# Patient Record
Sex: FEMALE | Race: BLACK OR AFRICAN AMERICAN | NOT HISPANIC OR LATINO | Employment: FULL TIME | URBAN - METROPOLITAN AREA
[De-identification: names, ages, dates, MRNs, and addresses within clinical notes are randomized per-mention and may not be internally consistent; named-entity substitution may affect disease eponyms.]

---

## 2021-12-09 ENCOUNTER — TELEPHONE (OUTPATIENT)
Dept: RHEUMATOLOGY | Facility: CLINIC | Age: 36
End: 2021-12-09
Payer: COMMERCIAL

## 2022-04-08 ENCOUNTER — TELEPHONE (OUTPATIENT)
Dept: RHEUMATOLOGY | Facility: CLINIC | Age: 37
End: 2022-04-08
Payer: COMMERCIAL

## 2022-04-11 ENCOUNTER — LAB VISIT (OUTPATIENT)
Dept: LAB | Facility: HOSPITAL | Age: 37
End: 2022-04-11
Attending: INTERNAL MEDICINE
Payer: COMMERCIAL

## 2022-04-11 ENCOUNTER — OFFICE VISIT (OUTPATIENT)
Dept: RHEUMATOLOGY | Facility: CLINIC | Age: 37
End: 2022-04-11
Payer: COMMERCIAL

## 2022-04-11 VITALS
DIASTOLIC BLOOD PRESSURE: 99 MMHG | BODY MASS INDEX: 39.09 KG/M2 | HEIGHT: 68 IN | SYSTOLIC BLOOD PRESSURE: 164 MMHG | WEIGHT: 257.94 LBS | HEART RATE: 63 BPM

## 2022-04-11 DIAGNOSIS — M79.7 FIBROMYALGIA: Primary | ICD-10-CM

## 2022-04-11 DIAGNOSIS — M79.7 FIBROMYALGIA: ICD-10-CM

## 2022-04-11 LAB
CRP SERPL-MCNC: 20.7 MG/L (ref 0–8.2)
ERYTHROCYTE [SEDIMENTATION RATE] IN BLOOD BY WESTERGREN METHOD: 59 MM/HR (ref 0–20)
RHEUMATOID FACT SERPL-ACNC: <13 IU/ML (ref 0–15)

## 2022-04-11 PROCEDURE — 99204 PR OFFICE/OUTPT VISIT, NEW, LEVL IV, 45-59 MIN: ICD-10-PCS | Mod: S$GLB,,, | Performed by: INTERNAL MEDICINE

## 2022-04-11 PROCEDURE — 86038 ANTINUCLEAR ANTIBODIES: CPT | Performed by: INTERNAL MEDICINE

## 2022-04-11 PROCEDURE — 99204 OFFICE O/P NEW MOD 45 MIN: CPT | Mod: S$GLB,,, | Performed by: INTERNAL MEDICINE

## 2022-04-11 PROCEDURE — 85651 RBC SED RATE NONAUTOMATED: CPT | Performed by: INTERNAL MEDICINE

## 2022-04-11 PROCEDURE — 36415 COLL VENOUS BLD VENIPUNCTURE: CPT | Mod: PO | Performed by: INTERNAL MEDICINE

## 2022-04-11 PROCEDURE — 1159F MED LIST DOCD IN RCRD: CPT | Mod: CPTII,S$GLB,, | Performed by: INTERNAL MEDICINE

## 2022-04-11 PROCEDURE — 81374 HLA I TYPING 1 ANTIGEN LR: CPT | Performed by: INTERNAL MEDICINE

## 2022-04-11 PROCEDURE — 3077F SYST BP >= 140 MM HG: CPT | Mod: CPTII,S$GLB,, | Performed by: INTERNAL MEDICINE

## 2022-04-11 PROCEDURE — 1159F PR MEDICATION LIST DOCUMENTED IN MEDICAL RECORD: ICD-10-PCS | Mod: CPTII,S$GLB,, | Performed by: INTERNAL MEDICINE

## 2022-04-11 PROCEDURE — 3080F PR MOST RECENT DIASTOLIC BLOOD PRESSURE >= 90 MM HG: ICD-10-PCS | Mod: CPTII,S$GLB,, | Performed by: INTERNAL MEDICINE

## 2022-04-11 PROCEDURE — 99999 PR PBB SHADOW E&M-EST. PATIENT-LVL III: ICD-10-PCS | Mod: PBBFAC,,, | Performed by: INTERNAL MEDICINE

## 2022-04-11 PROCEDURE — 3008F BODY MASS INDEX DOCD: CPT | Mod: CPTII,S$GLB,, | Performed by: INTERNAL MEDICINE

## 2022-04-11 PROCEDURE — 99999 PR PBB SHADOW E&M-EST. PATIENT-LVL III: CPT | Mod: PBBFAC,,, | Performed by: INTERNAL MEDICINE

## 2022-04-11 PROCEDURE — 86140 C-REACTIVE PROTEIN: CPT | Performed by: INTERNAL MEDICINE

## 2022-04-11 PROCEDURE — 3008F PR BODY MASS INDEX (BMI) DOCUMENTED: ICD-10-PCS | Mod: CPTII,S$GLB,, | Performed by: INTERNAL MEDICINE

## 2022-04-11 PROCEDURE — 86200 CCP ANTIBODY: CPT | Performed by: INTERNAL MEDICINE

## 2022-04-11 PROCEDURE — 3077F PR MOST RECENT SYSTOLIC BLOOD PRESSURE >= 140 MM HG: ICD-10-PCS | Mod: CPTII,S$GLB,, | Performed by: INTERNAL MEDICINE

## 2022-04-11 PROCEDURE — 86431 RHEUMATOID FACTOR QUANT: CPT | Performed by: INTERNAL MEDICINE

## 2022-04-11 PROCEDURE — 3080F DIAST BP >= 90 MM HG: CPT | Mod: CPTII,S$GLB,, | Performed by: INTERNAL MEDICINE

## 2022-04-11 RX ORDER — CETIRIZINE HYDROCHLORIDE 10 MG/1
10 TABLET ORAL
COMMUNITY

## 2022-04-11 RX ORDER — VILAZODONE HYDROCHLORIDE 10 MG/1
10 TABLET ORAL DAILY
COMMUNITY
Start: 2022-03-31

## 2022-04-11 RX ORDER — PANTOPRAZOLE SODIUM 40 MG/1
40 TABLET, DELAYED RELEASE ORAL DAILY
COMMUNITY
Start: 2022-01-13 | End: 2022-05-30

## 2022-04-11 RX ORDER — ERGOCALCIFEROL 1.25 MG/1
50000 CAPSULE ORAL
COMMUNITY
Start: 2021-09-09 | End: 2022-05-24

## 2022-04-11 RX ORDER — LEVOCETIRIZINE DIHYDROCHLORIDE 5 MG/1
5 TABLET, FILM COATED ORAL NIGHTLY
COMMUNITY

## 2022-04-11 RX ORDER — ONDANSETRON 4 MG/1
4 TABLET, ORALLY DISINTEGRATING ORAL DAILY PRN
COMMUNITY
Start: 2022-04-04

## 2022-04-11 NOTE — PROGRESS NOTES
RHEUMATOLOGY CLINIC INITIAL VISIT    Reason for consult:-  Fibromyalgia.    Chief complaints, HPI, ROS, EXAM, Assessment & Plans:-  Bee Champagne a 36 y.o. pleasant female comes in with worsening symptoms of fibromyalgia.  Fibromyalgia diagnosed by outside rheumatologist several years ago.  Had intolerance to Cymbalta.  Worsening symptoms affecting all over the body with prolonged stiffness.  Intermittent episodes of joint swelling.  There were by outs of swollen fingers raising suspicion for underlying dactylitis.  Her father has skin issues and nail deformities raising suspicion for psoriasis.  She does not have any history of such skin condition.  Low titer positive SHANNON with speckled pattern in the past.  Elevated inflammatory marker-C-reactive protein in the past.  Rheumatological review of system negative otherwise.  Physical examination shows multiple enthesitis.  No synovitis, dactylitis or effusion.  No nail changes.  No sclerodactyly.    1. Fibromyalgia      Problem List Items Addressed This Visit     Fibromyalgia - Primary    Relevant Medications    natrexone tablet 4.5 mg    Other Relevant Orders    Sedimentation rate    C-Reactive Protein    Cyclic Citrullinated Peptide Antibody, IgG    Rheumatoid Factor    SHANNON Screen w/Reflex    HLA B27 Antigen           Active fibromyalgia.  Try low-dose naltrexone.   Repeat inflammatory markers and serology panel for elevated C-reactive protein and enthesitis.  Fibromyalgia could cause enthesitis as well.   If no significant improvement on low-dose naltrexone in 6-8 weeks, consider alternative medications.   If workup shows elevated inflammatory markers, consider adding Plaquenil/methotrexate for possible enthesitis related arthritis.       # Follow up in about 6 weeks (around 2022).      Past Medical History:   Diagnosis Date    Anxiety     Depression     Interstitial cystitis        Past Surgical History:   Procedure Laterality Date      SECTION      TUBAL LIGATION          Social History     Tobacco Use    Smoking status: Never Smoker    Smokeless tobacco: Never Used   Substance Use Topics    Alcohol use: Not Currently    Drug use: Never       Family History   Problem Relation Age of Onset    Cancer Mother     Heart disease Father     Diabetes Mellitus Father     Cancer Paternal Aunt     Hyperlipidemia Paternal Aunt        Review of patient's allergies indicates:   Allergen Reactions    Shellfish containing products        Medication List with Changes/Refills   Current Medications    CETIRIZINE (ZYRTEC) 10 MG TABLET    Take 10 mg by mouth.    D-MANNOSE 500 MG CAP    Take by mouth.    ERGOCALCIFEROL (ERGOCALCIFEROL) 50,000 UNIT CAP    Take 50,000 Units by mouth.    IRON BIS-GLY/FA/C/B12/CA/SUCC (IRON-150 ORAL)    Take by mouth.    LEVOCETIRIZINE (XYZAL) 5 MG TABLET    Take 5 mg by mouth every evening.    ONDANSETRON (ZOFRAN-ODT) 4 MG TBDL    Take 4 mg by mouth daily as needed.    PANTOPRAZOLE (PROTONIX) 40 MG TABLET    Take 40 mg by mouth once daily.    VIIBRYD 10 MG TAB TABLET    Take 10 mg by mouth once daily.         Thank you for allowing me to participate in the care ofBee Condon.    Disclaimer: This note was prepared using voice recognition system and is likely to have sound alike errors and is not proof read.  Please call me with any questions.

## 2022-04-12 ENCOUNTER — PATIENT MESSAGE (OUTPATIENT)
Dept: RHEUMATOLOGY | Facility: CLINIC | Age: 37
End: 2022-04-12
Payer: COMMERCIAL

## 2022-04-12 DIAGNOSIS — L40.59 POLYARTICULAR PSORIATIC ARTHRITIS: ICD-10-CM

## 2022-04-12 DIAGNOSIS — M06.4 UNDIFFERENTIATED INFLAMMATORY ARTHRITIS: Primary | ICD-10-CM

## 2022-04-12 LAB
ANA SER QL IF: NORMAL
CCP AB SER IA-ACNC: <0.5 U/ML

## 2022-04-12 RX ORDER — FOLIC ACID 1 MG/1
1 TABLET ORAL DAILY
Qty: 90 TABLET | Refills: 2 | Status: SHIPPED | OUTPATIENT
Start: 2022-04-12 | End: 2023-01-09

## 2022-04-12 RX ORDER — METHOTREXATE 2.5 MG/1
10 TABLET ORAL
Qty: 16 TABLET | Refills: 2 | Status: SHIPPED | OUTPATIENT
Start: 2022-04-12 | End: 2022-05-24

## 2022-04-12 NOTE — TELEPHONE ENCOUNTER
Labs show high inflammation as suspected. With her father having psoriasis , it is more likely to be psoriatic arthritis. Start MTX and folic acid.   Called and advised patient today.  Discussed in detail about all adverse effects and risks with methotrexate.  No plans for pregnancy.  TUBAL LIGATION.  Schedule CBC CMP for tomorrow to learn liver functions before starting methotrexate.

## 2022-04-13 ENCOUNTER — PATIENT MESSAGE (OUTPATIENT)
Dept: RHEUMATOLOGY | Facility: CLINIC | Age: 37
End: 2022-04-13
Payer: COMMERCIAL

## 2022-04-13 ENCOUNTER — LAB VISIT (OUTPATIENT)
Dept: LAB | Facility: HOSPITAL | Age: 37
End: 2022-04-13
Attending: INTERNAL MEDICINE
Payer: COMMERCIAL

## 2022-04-13 DIAGNOSIS — L40.59 POLYARTICULAR PSORIATIC ARTHRITIS: ICD-10-CM

## 2022-04-13 DIAGNOSIS — M06.4 UNDIFFERENTIATED INFLAMMATORY ARTHRITIS: ICD-10-CM

## 2022-04-13 LAB
ALBUMIN SERPL BCP-MCNC: 3.6 G/DL (ref 3.5–5.2)
ALP SERPL-CCNC: 71 U/L (ref 55–135)
ALT SERPL W/O P-5'-P-CCNC: 12 U/L (ref 10–44)
ANION GAP SERPL CALC-SCNC: 7 MMOL/L (ref 8–16)
AST SERPL-CCNC: 13 U/L (ref 10–40)
BASOPHILS # BLD AUTO: 0.03 K/UL (ref 0–0.2)
BASOPHILS NFR BLD: 0.5 % (ref 0–1.9)
BILIRUB SERPL-MCNC: 0.3 MG/DL (ref 0.1–1)
BUN SERPL-MCNC: 8 MG/DL (ref 6–20)
CALCIUM SERPL-MCNC: 8.9 MG/DL (ref 8.7–10.5)
CHLORIDE SERPL-SCNC: 103 MMOL/L (ref 95–110)
CK SERPL-CCNC: 101 U/L (ref 20–180)
CO2 SERPL-SCNC: 28 MMOL/L (ref 23–29)
CREAT SERPL-MCNC: 0.9 MG/DL (ref 0.5–1.4)
DIFFERENTIAL METHOD: ABNORMAL
EOSINOPHIL # BLD AUTO: 0.2 K/UL (ref 0–0.5)
EOSINOPHIL NFR BLD: 3.7 % (ref 0–8)
ERYTHROCYTE [DISTWIDTH] IN BLOOD BY AUTOMATED COUNT: 12.5 % (ref 11.5–14.5)
EST. GFR  (AFRICAN AMERICAN): >60 ML/MIN/1.73 M^2
EST. GFR  (NON AFRICAN AMERICAN): >60 ML/MIN/1.73 M^2
GLUCOSE SERPL-MCNC: 87 MG/DL (ref 70–110)
HCT VFR BLD AUTO: 37.7 % (ref 37–48.5)
HGB BLD-MCNC: 12.3 G/DL (ref 12–16)
IMM GRANULOCYTES # BLD AUTO: 0.02 K/UL (ref 0–0.04)
IMM GRANULOCYTES NFR BLD AUTO: 0.3 % (ref 0–0.5)
LYMPHOCYTES # BLD AUTO: 1.8 K/UL (ref 1–4.8)
LYMPHOCYTES NFR BLD: 28.4 % (ref 18–48)
MCH RBC QN AUTO: 28.2 PG (ref 27–31)
MCHC RBC AUTO-ENTMCNC: 32.6 G/DL (ref 32–36)
MCV RBC AUTO: 87 FL (ref 82–98)
MONOCYTES # BLD AUTO: 0.5 K/UL (ref 0.3–1)
MONOCYTES NFR BLD: 7.8 % (ref 4–15)
NEUTROPHILS # BLD AUTO: 3.7 K/UL (ref 1.8–7.7)
NEUTROPHILS NFR BLD: 59.3 % (ref 38–73)
NRBC BLD-RTO: 0 /100 WBC
PLATELET # BLD AUTO: 362 K/UL (ref 150–450)
PMV BLD AUTO: 8.5 FL (ref 9.2–12.9)
POTASSIUM SERPL-SCNC: 4 MMOL/L (ref 3.5–5.1)
PROT SERPL-MCNC: 7.3 G/DL (ref 6–8.4)
RBC # BLD AUTO: 4.36 M/UL (ref 4–5.4)
SODIUM SERPL-SCNC: 138 MMOL/L (ref 136–145)
WBC # BLD AUTO: 6.26 K/UL (ref 3.9–12.7)

## 2022-04-13 PROCEDURE — 82550 ASSAY OF CK (CPK): CPT | Performed by: INTERNAL MEDICINE

## 2022-04-13 PROCEDURE — 85025 COMPLETE CBC W/AUTO DIFF WBC: CPT | Performed by: INTERNAL MEDICINE

## 2022-04-13 PROCEDURE — 36415 COLL VENOUS BLD VENIPUNCTURE: CPT | Performed by: INTERNAL MEDICINE

## 2022-04-13 PROCEDURE — 80053 COMPREHEN METABOLIC PANEL: CPT | Performed by: INTERNAL MEDICINE

## 2022-04-13 PROCEDURE — 82085 ASSAY OF ALDOLASE: CPT | Performed by: INTERNAL MEDICINE

## 2022-04-14 ENCOUNTER — PATIENT MESSAGE (OUTPATIENT)
Dept: RHEUMATOLOGY | Facility: CLINIC | Age: 37
End: 2022-04-14
Payer: COMMERCIAL

## 2022-04-15 LAB — ALDOLASE SERPL-CCNC: 4.3 U/L (ref 1.2–7.6)

## 2022-05-03 ENCOUNTER — PATIENT MESSAGE (OUTPATIENT)
Dept: RHEUMATOLOGY | Facility: CLINIC | Age: 37
End: 2022-05-03
Payer: COMMERCIAL

## 2022-05-04 ENCOUNTER — PATIENT MESSAGE (OUTPATIENT)
Dept: RHEUMATOLOGY | Facility: CLINIC | Age: 37
End: 2022-05-04
Payer: COMMERCIAL

## 2022-05-04 NOTE — TELEPHONE ENCOUNTER
Zahira EspanaSteven Conodn,  It takes 3-4 weeks for methotrexate to provide any benefit in terms of arthritis.  Methotrexate could cause fatigue starting 24 hours, lasting up to 72 hours after the dosing.  I would recommend taking Mucinex DM 1 tablet every 12 hours for the 1st 2-3 days after methotrexate dose.  If the symptoms fails to improve with the subsequent dosing, discontinue medication.  We will consider alternative therapies.  Continue daily folic acid  Dr. LORD

## 2022-05-05 LAB
HLA B27 INTERPRETATION: NORMAL
HLA-B27 RELATED AG QL: NEGATIVE
HLA-B27 RELATED AG QL: NORMAL

## 2022-05-24 ENCOUNTER — TELEPHONE (OUTPATIENT)
Dept: RHEUMATOLOGY | Facility: CLINIC | Age: 37
End: 2022-05-24
Payer: COMMERCIAL

## 2022-05-24 ENCOUNTER — LAB VISIT (OUTPATIENT)
Dept: LAB | Facility: HOSPITAL | Age: 37
End: 2022-05-24
Attending: PHYSICIAN ASSISTANT
Payer: COMMERCIAL

## 2022-05-24 ENCOUNTER — OFFICE VISIT (OUTPATIENT)
Dept: RHEUMATOLOGY | Facility: CLINIC | Age: 37
End: 2022-05-24
Payer: COMMERCIAL

## 2022-05-24 VITALS
WEIGHT: 260.81 LBS | BODY MASS INDEX: 39.53 KG/M2 | HEART RATE: 69 BPM | DIASTOLIC BLOOD PRESSURE: 90 MMHG | SYSTOLIC BLOOD PRESSURE: 136 MMHG | HEIGHT: 68 IN

## 2022-05-24 DIAGNOSIS — Z51.81 MEDICATION MONITORING ENCOUNTER: ICD-10-CM

## 2022-05-24 DIAGNOSIS — Z51.81 MEDICATION MONITORING ENCOUNTER: Primary | ICD-10-CM

## 2022-05-24 DIAGNOSIS — M06.4 UNDIFFERENTIATED INFLAMMATORY ARTHRITIS: ICD-10-CM

## 2022-05-24 DIAGNOSIS — M79.7 FIBROMYALGIA: ICD-10-CM

## 2022-05-24 DIAGNOSIS — L40.59 POLYARTICULAR PSORIATIC ARTHRITIS: Primary | ICD-10-CM

## 2022-05-24 DIAGNOSIS — D84.9 IMMUNOCOMPROMISED: ICD-10-CM

## 2022-05-24 PROCEDURE — 3075F PR MOST RECENT SYSTOLIC BLOOD PRESS GE 130-139MM HG: ICD-10-PCS | Mod: CPTII,S$GLB,, | Performed by: PHYSICIAN ASSISTANT

## 2022-05-24 PROCEDURE — 36415 COLL VENOUS BLD VENIPUNCTURE: CPT | Performed by: PHYSICIAN ASSISTANT

## 2022-05-24 PROCEDURE — 87340 HEPATITIS B SURFACE AG IA: CPT | Performed by: PHYSICIAN ASSISTANT

## 2022-05-24 PROCEDURE — 3080F PR MOST RECENT DIASTOLIC BLOOD PRESSURE >= 90 MM HG: ICD-10-PCS | Mod: CPTII,S$GLB,, | Performed by: PHYSICIAN ASSISTANT

## 2022-05-24 PROCEDURE — 3080F DIAST BP >= 90 MM HG: CPT | Mod: CPTII,S$GLB,, | Performed by: PHYSICIAN ASSISTANT

## 2022-05-24 PROCEDURE — 1159F MED LIST DOCD IN RCRD: CPT | Mod: CPTII,S$GLB,, | Performed by: PHYSICIAN ASSISTANT

## 2022-05-24 PROCEDURE — 99999 PR PBB SHADOW E&M-EST. PATIENT-LVL III: CPT | Mod: PBBFAC,,, | Performed by: PHYSICIAN ASSISTANT

## 2022-05-24 PROCEDURE — 3008F BODY MASS INDEX DOCD: CPT | Mod: CPTII,S$GLB,, | Performed by: PHYSICIAN ASSISTANT

## 2022-05-24 PROCEDURE — 99999 PR PBB SHADOW E&M-EST. PATIENT-LVL III: ICD-10-PCS | Mod: PBBFAC,,, | Performed by: PHYSICIAN ASSISTANT

## 2022-05-24 PROCEDURE — 86704 HEP B CORE ANTIBODY TOTAL: CPT | Performed by: PHYSICIAN ASSISTANT

## 2022-05-24 PROCEDURE — 99215 PR OFFICE/OUTPT VISIT, EST, LEVL V, 40-54 MIN: ICD-10-PCS | Mod: S$GLB,,, | Performed by: PHYSICIAN ASSISTANT

## 2022-05-24 PROCEDURE — 1159F PR MEDICATION LIST DOCUMENTED IN MEDICAL RECORD: ICD-10-PCS | Mod: CPTII,S$GLB,, | Performed by: PHYSICIAN ASSISTANT

## 2022-05-24 PROCEDURE — 99215 OFFICE O/P EST HI 40 MIN: CPT | Mod: S$GLB,,, | Performed by: PHYSICIAN ASSISTANT

## 2022-05-24 PROCEDURE — 3075F SYST BP GE 130 - 139MM HG: CPT | Mod: CPTII,S$GLB,, | Performed by: PHYSICIAN ASSISTANT

## 2022-05-24 PROCEDURE — 86480 TB TEST CELL IMMUN MEASURE: CPT | Performed by: PHYSICIAN ASSISTANT

## 2022-05-24 PROCEDURE — 3008F PR BODY MASS INDEX (BMI) DOCUMENTED: ICD-10-PCS | Mod: CPTII,S$GLB,, | Performed by: PHYSICIAN ASSISTANT

## 2022-05-24 RX ORDER — UPADACITINIB 15 MG/1
15 TABLET, EXTENDED RELEASE ORAL DAILY
Qty: 30 TABLET | Refills: 11 | Status: SHIPPED | OUTPATIENT
Start: 2022-05-24 | End: 2022-07-18 | Stop reason: SDUPTHER

## 2022-05-24 NOTE — PROGRESS NOTES
"     RHEUMATOLOGY OUTPATIENT CLINIC NOTE    5/24/2022    Attending Rheumatologist: Nereida Koroma PA-C  Primary Care Provider: Primary Doctor No   Chief Complaint/Reason For Consultation:  Psoriatic Arthritis      Subjective:        Bee Condon is a 36 y.o. female here today for follow up new onset psoriatic arthritis.  She has previously been followed by Rheumatology for fibromyalgia.  At her last office visit in April 2022 she was found to have worsening symptoms all over the body with prolonged stiffness in combination with intermittent in swollen digits.  She does have a family history of skin psoriasis.  History of positive SHANNON with a speckled pattern in the past she also has a history of elevated inflammatory markers.  She was started on methotrexate but was unable to tolerate.  She had extreme fatigue and some GI upset.  She is on low-dose naltrexone for her fibro.  She has not seen much of a difference yet. Rheumatologic systems otherwise negative.  Physical exam shows no skin psoriasis.  She does have multiple tender points consistent with her diagnosis of fibromyalgia.  No active Raynaud's.  No dactylitis present on today's exam.    Serologies   Positive SHANNON with speckled pattern-low titer  Lab Results   Component Value Date    RF <13.0 04/11/2022    Negative HLA B27  Current Rheum Medications:  · Low-dose naltrexone  Previous Rheum Medications:   · MTX, Cymbalta    Past Medical History:   Diagnosis Date    Anxiety     Depression     Interstitial cystitis 2019     Social History     Socioeconomic History    Marital status:    Tobacco Use    Smoking status: Never Smoker    Smokeless tobacco: Never Used   Substance and Sexual Activity    Alcohol use: Not Currently    Drug use: Never    Sexual activity: Yes     Review of patient's allergies indicates:   Allergen Reactions    Shellfish containing products        Objective:   BP (!) 136/90   Pulse 69   Ht 5' 8" (1.727 m)   Wt 118.3 " kg (260 lb 12.9 oz)   BMI 39.66 kg/m²     Immunization History   Administered Date(s) Administered    COVID-19, MRNA, LN-S, PF (MODERNA FULL 0.5 ML DOSE) 02/27/2021, 03/25/2021, 11/08/2021       Current Outpatient Medications:     cetirizine (ZYRTEC) 10 MG tablet, Take 10 mg by mouth., Disp: , Rfl:     d-mannose 500 mg Cap, Take by mouth., Disp: , Rfl:     folic acid (FOLVITE) 1 MG tablet, Take 1 tablet (1 mg total) by mouth once daily., Disp: 90 tablet, Rfl: 2    iron bis-gly/FA/C/B12/Ca/succ (IRON-150 ORAL), Take by mouth., Disp: , Rfl:     levocetirizine (XYZAL) 5 MG tablet, Take 5 mg by mouth every evening., Disp: , Rfl:     natrexone tablet 4.5 mg, Take 1 tablet (4.5 mg total) by mouth every evening., Disp: 90 tablet, Rfl: 3    ondansetron (ZOFRAN-ODT) 4 MG TbDL, Take 4 mg by mouth daily as needed., Disp: , Rfl:     pantoprazole (PROTONIX) 40 MG tablet, Take 40 mg by mouth once daily., Disp: , Rfl:     VIIBRYD 10 mg Tab tablet, Take 10 mg by mouth once daily., Disp: , Rfl:     upadacitinib (RINVOQ) 15 mg 24 hr tablet, Take 1 tablet (15 mg total) by mouth once daily., Disp: 30 tablet, Rfl: 11      Assessment:     1. Polyarticular psoriatic arthritis    2. Medication monitoring encounter    3. Fibromyalgia    4. Immunocompromised          Plan:       · Active psoriatic arthritis and unable to tolerate methotrexate.  Avoid use of TNF inhibitors with history of positive SHANNON.  Trial Rinvoq.  Check TB screen and hepatitis-B screen today.  · Compromised immune system secondary to autoimmune disease and use of immunosuppressive drugs. Monitor carefully for infections and toxicities- Currently denies issues with recurrent infections. Advised to get immediate medical care if any infection.  · Recommend Yearly Skin Cancer Screening by Dermatology for all patients on biologic or Kirk. advised strict adherence to age appropriate vaccinations (shingles, pneumonia, flu and covid) and cancer screenings with PCP    · Patient advised to hold DMARD and/or biologic therapy for signs of infection or for surgery. If you are unsure what to do please call our office for instruction.[facility] [phone]  · no current medication related issues, no evidence of toxicity. I ordered labs for toxicity monitoring;  results reviewed and discussed findings with the patient   · Return to clinic:  3 months with safety labs    The patient understands, chooses and consents to this plan and accepts all the risks which include but are not limited to the risks mentioned above.     Method of contact with patient concerns: [facility]    60 minutes of total time spent on the encounter, which includes face to face time and non-face to face time preparing to see the patient (eg, review of tests), Obtaining and/or reviewing separately obtained history, Documenting clinical information in the electronic or other health record, Independently interpreting results (not separately reported) and communicating results to the patient/family/caregiver, or Care coordination (not separately reported).          Disclaimer: This note was prepared using a voice recognition system and is likely to have sound alike errors within the text.       Nereida Koroma PA-C  Rheumatology Department   [facility]

## 2022-05-25 ENCOUNTER — DOCUMENTATION ONLY (OUTPATIENT)
Dept: RHEUMATOLOGY | Facility: CLINIC | Age: 37
End: 2022-05-25
Payer: COMMERCIAL

## 2022-05-25 LAB
HBV CORE AB SERPL QL IA: NEGATIVE
HBV SURFACE AG SERPL QL IA: NEGATIVE

## 2022-05-26 ENCOUNTER — PATIENT MESSAGE (OUTPATIENT)
Dept: RHEUMATOLOGY | Facility: CLINIC | Age: 37
End: 2022-05-26
Payer: COMMERCIAL

## 2022-05-26 ENCOUNTER — SPECIALTY PHARMACY (OUTPATIENT)
Dept: PHARMACY | Facility: CLINIC | Age: 37
End: 2022-05-26
Payer: COMMERCIAL

## 2022-05-26 LAB
GAMMA INTERFERON BACKGROUND BLD IA-ACNC: 0 IU/ML
M TB IFN-G CD4+ BCKGRND COR BLD-ACNC: 0.01 IU/ML
MITOGEN IGNF BCKGRD COR BLD-ACNC: 8.83 IU/ML
TB GOLD PLUS: NEGATIVE
TB2 - NIL: 0.01 IU/ML

## 2022-05-26 NOTE — TELEPHONE ENCOUNTER
Hello, this is Shameka Mendiola with [facility].  We are working on your prescription that your doctor has sent us. We will be working with your insurance to get this approved for you. We will be calling you along the way with updates on your medication.  If you have any questions, you can reach us at [phone].    Welcome call outcome: Patient/caregiver reached     Awaiting TB results.

## 2022-05-26 NOTE — TELEPHONE ENCOUNTER
Financial Assistance - Rinvoq    -Obtained consent for FA? Patient already had a copay card    BIN: [id number]  JESUSITAN: [id number]  ID: [id number]  Group: [id number]

## 2022-05-26 NOTE — TELEPHONE ENCOUNTER
Benefits Investigation - Rinvoq    Insurance name: [employer]  Rep name:     Copay amount: $100  Deductible: $0  OOPmax: $3000  OSP in network? Yes  Tier level (if applicable): N/A

## 2022-05-27 ENCOUNTER — PATIENT MESSAGE (OUTPATIENT)
Dept: RHEUMATOLOGY | Facility: CLINIC | Age: 37
End: 2022-05-27
Payer: COMMERCIAL

## 2022-05-30 ENCOUNTER — SPECIALTY PHARMACY (OUTPATIENT)
Dept: PHARMACY | Facility: CLINIC | Age: 37
End: 2022-05-30
Payer: COMMERCIAL

## 2022-05-30 NOTE — TELEPHONE ENCOUNTER
Specialty Pharmacy - Initial Clinical Assessment    Specialty Medication Orders Linked to Encounter    Flowsheet Row Most Recent Value   Medication #1 upadacitinib (RINVOQ) 15 mg 24 hr tablet (Order#[id number], Rx#[id number])        Patient Diagnosis   L40.59 - Polyarticular psoriatic arthritis    Subjective    Bee Condon is a 36 y.o. female, who is followed by the specialty pharmacy service for management and education.    Recent Encounters     Date Type Provider Description    05/30/2022 Specialty Pharmacy Luz Maria Richards, Ritesh Initial Clinical Assessment    05/26/2022 Specialty Pharmacy Shameka Mendiola, Ritesh Referral Authorization        Clinical call attempts since last clinical assessment   No call attempts found.     Current Outpatient Medications   Medication Sig    cetirizine (ZYRTEC) 10 MG tablet Take 10 mg by mouth.    d-mannose 500 mg Cap Take by mouth.    folic acid (FOLVITE) 1 MG tablet Take 1 tablet (1 mg total) by mouth once daily.    iron bis-gly/FA/C/B12/Ca/succ (IRON-150 ORAL) Take by mouth.    levocetirizine (XYZAL) 5 MG tablet Take 5 mg by mouth every evening.    natrexone tablet 4.5 mg Take 1 tablet (4.5 mg total) by mouth every evening.    ondansetron (ZOFRAN-ODT) 4 MG TbDL Take 4 mg by mouth daily as needed.    upadacitinib (RINVOQ) 15 mg 24 hr tablet Take 1 tablet (15 mg total) by mouth once daily.    VIIBRYD 10 mg Tab tablet Take 10 mg by mouth once daily.   Last reviewed on 5/30/2022 10:39 AM by Luz Maria Richards, Ritesh    Review of patient's allergies indicates:   Allergen Reactions    Shellfish containing products    Last reviewed on  5/30/2022 10:33 AM by Luz Maria Richards    Drug Interactions    Drug interactions evaluated: yes  Clinically relevant drug interactions identified: yes   Interactions list: DDI duplicate therapy- Zyrtec + Xyzal   Drug management plan: Pt confirmed she does not take both at the same time.  She alternates.     Provided the patient with educational  material regarding drug interactions: not applicable         Adverse Effects    Activity change: Pos  Fatigue: Pos  Arthralgias: Pos       Assessment Questions - Documented Responses    Flowsheet Row Most Recent Value   Assessment    Medication Reconciliation completed for patient Yes   During the past 4 weeks, has patient missed any activities due to condition or medication? Missed Work  [Pt said she had to quit her job and is unable to work due to PsA]   Number of Days missed 28   During the past 4 weeks, did patient have any of the following urgent care visits? None   Goals of Therapy Status Discussed (new start)   Status of the patients ability to self-administer: Is Able   All education points have been covered with patient? Yes, supplemental printed education provided   Welcome packet contents reviewed and discussed with patient? Yes   Assesment completed? Yes   Plan Therapy being initiated   Do you need to open a clinical intervention (i-vent)? No   Do you want to schedule first shipment? Yes        Refill Questions - Documented Responses    Flowsheet Row Most Recent Value   Patient Availability and HIPAA Verification    Does patient want to proceed with activity? Yes   HIPAA/medical authority confirmed? Yes   Relationship to patient of person spoken to? Self   Refill Screening Questions    When does the patient need to receive the medication? 05/31/22   Refill Delivery Questions    How will the patient receive the medication? Delivery Lyla   When does the patient need to receive the medication? 05/31/22   Shipping Address Home   Address in [facility] confirmed and updated if neccessary? Yes   Expected Copay ($) 5   Is the patient able to afford the medication copay? Yes   Payment Method CC on file   Days supply of Refill 30   Supplies needed? No supplies needed   Refill activity completed? Yes   Refill activity plan Refill scheduled   Shipment/Pickup Date: 05/31/22          Objective    She has a  "past medical history of Anxiety, Depression, and Interstitial cystitis (2019).    Tried/failed medications: MTX    BP Readings from Last 4 Encounters:   05/24/22 (!) 136/90   04/11/22 (!) 164/99     Ht Readings from Last 4 Encounters:   05/24/22 5' 8" (1.727 m)   04/11/22 5' 8" (1.727 m)     Wt Readings from Last 4 Encounters:   05/24/22 118.3 kg (260 lb 12.9 oz)   04/11/22 117 kg (257 lb 15 oz)     Recent Labs   Lab Result Units 05/24/22  1135 04/13/22  1157   Creatinine mg/dL  --  0.9   ALT U/L  --  12   AST U/L  --  13   Hepatitis B Surface Ag  Negative  --    Hep B Core Total Ab  Negative  --      The goals of prescribed drug therapy management include:  · Supporting patient to meet the prescriber's medical treatment objectives  · Improving or maintaining quality of life  · Maintaining optimal therapy adherence  · Minimizing and managing side effects      Goals of Therapy Status: Discussed (new start)    Assessment/Plan  Patient plans to start therapy on 05/31/22      Indication, dosage, appropriateness, effectiveness, safety and convenience of her specialty medication(s) were reviewed today.     Patient Education   Patient received education on the following:    Expectations and possible outcomes of therapy   Proper use, timely administration, and missed dose management   Duration of therapy   Side effects, including prevention, minimization, and management   Contraindications and safety precautions   New or changed medications, including prescribe and over the counter medications and supplements   Reviews recommended vaccinations, as appropriate   Storage, safe handling, and disposal        Tasks added this encounter   6/23/2022 - Refill Call (Auto Added)  2/28/2023 - Clinical - Follow Up Assesement (Annual)   Tasks due within next 3 months   No tasks due.     Luz Maria Richards, PharmD  [facility]  [address] [facility] [address]  Phone: [phone]  Fax: " [phone]

## 2022-06-07 ENCOUNTER — PATIENT MESSAGE (OUTPATIENT)
Dept: RHEUMATOLOGY | Facility: CLINIC | Age: 37
End: 2022-06-07
Payer: COMMERCIAL

## 2022-06-24 ENCOUNTER — SPECIALTY PHARMACY (OUTPATIENT)
Dept: PHARMACY | Facility: CLINIC | Age: 37
End: 2022-06-24
Payer: COMMERCIAL

## 2022-06-24 NOTE — TELEPHONE ENCOUNTER
Rinvoq transferred to [facility] ([phone]). Called patient and provided this information and advised to reach out to [facility] to schedule delivery. Dis-enrolling patient from [facility] services at this time.

## 2022-06-24 NOTE — TELEPHONE ENCOUNTER
Patient returned [facility]'s refill call for Rinvoq. Claim rejected stating that plan limits are exceeded (max 1 fill per 999 days) and patient must fill at specialty pharmacy.    Called insurance help desk and was informed by rep that patient is allowed a max of one fill outside of [facility] specialty pharmacy and has exceeded that. Patient is now mandated to fill at [facility].

## 2022-07-04 ENCOUNTER — PATIENT MESSAGE (OUTPATIENT)
Dept: RHEUMATOLOGY | Facility: CLINIC | Age: 37
End: 2022-07-04
Payer: COMMERCIAL

## 2022-07-05 ENCOUNTER — PATIENT MESSAGE (OUTPATIENT)
Dept: RHEUMATOLOGY | Facility: CLINIC | Age: 37
End: 2022-07-05
Payer: COMMERCIAL

## 2022-07-10 ENCOUNTER — PATIENT MESSAGE (OUTPATIENT)
Dept: RHEUMATOLOGY | Facility: CLINIC | Age: 37
End: 2022-07-10
Payer: COMMERCIAL

## 2022-07-10 DIAGNOSIS — M79.7 FIBROMYALGIA: ICD-10-CM

## 2022-07-10 DIAGNOSIS — Z51.81 MEDICATION MONITORING ENCOUNTER: ICD-10-CM

## 2022-07-18 RX ORDER — UPADACITINIB 15 MG/1
15 TABLET, EXTENDED RELEASE ORAL DAILY
Qty: 30 TABLET | Refills: 11 | Status: SHIPPED | OUTPATIENT
Start: 2022-07-18 | End: 2023-02-15 | Stop reason: SDUPTHER

## 2022-07-19 ENCOUNTER — TELEPHONE (OUTPATIENT)
Dept: RHEUMATOLOGY | Facility: CLINIC | Age: 37
End: 2022-07-19
Payer: COMMERCIAL

## 2022-07-19 ENCOUNTER — SPECIALTY PHARMACY (OUTPATIENT)
Dept: PHARMACY | Facility: CLINIC | Age: 37
End: 2022-07-19
Payer: COMMERCIAL

## 2022-07-19 DIAGNOSIS — L40.59 POLYARTICULAR PSORIATIC ARTHRITIS: Primary | ICD-10-CM

## 2022-07-19 NOTE — TELEPHONE ENCOUNTER
----- Message from Hung Trujillo sent at 7/19/2022  1:52 PM CDT -----  Contact: Janeth/MICHAEL Fowler would like a call back at [phone] or fax at [phone] in regards to getting clinic notes and clarification for prior authorization.  Thanks

## 2022-07-20 ENCOUNTER — TELEPHONE (OUTPATIENT)
Dept: RHEUMATOLOGY | Facility: CLINIC | Age: 37
End: 2022-07-20
Payer: COMMERCIAL

## 2022-07-20 NOTE — TELEPHONE ENCOUNTER
----- Message from Shameka Mendiola PharmD sent at 7/20/2022 11:31 AM CDT -----  Regarding: Rinvoq  Good morning Charmaine and staff,     [facility] has submitted a PA to Mrs. Condon's new insurance for Rinvoq, however, the plan is requesting additional information: documentation that Mrs. Condon has experienced signs of improvement on Rinvoq. Would you be able to assess and document this in order to gain coverage for this continuation of therapy?      Kind regards,   Shameka Mendiola, MirandaD

## 2022-07-20 NOTE — TELEPHONE ENCOUNTER
----- Message from Shameka Mendiola PharmD sent at 7/20/2022  4:19 PM CDT -----  Regarding: Rinvoq  Good juan Padron and staff,     OSP has submitted a PA to Mrs. Condon's new insurance for Rinvoq, however, the plan is requesting additional information: documentation that Mrs. Condon has experienced signs of improvement on Rinvoq. Would you be able to assess and document this in order to gain coverage for this continuation of therapy?       Kind regards,   Shameka Mendiola, MirandaD

## 2022-07-20 NOTE — TELEPHONE ENCOUNTER
2nd attempt to contact patient to see how she was doing with rinvoq. No answer. lvm for patient to return call to clinic-DD

## 2022-07-20 NOTE — TELEPHONE ENCOUNTER
Has not had OV since starting rinvoq- will you please call to verbally confirm that rinvoq is helping her symptoms?

## 2022-07-21 ENCOUNTER — OFFICE VISIT (OUTPATIENT)
Dept: RHEUMATOLOGY | Facility: CLINIC | Age: 37
End: 2022-07-21
Payer: COMMERCIAL

## 2022-07-21 ENCOUNTER — TELEPHONE (OUTPATIENT)
Dept: RHEUMATOLOGY | Facility: CLINIC | Age: 37
End: 2022-07-21

## 2022-07-21 DIAGNOSIS — D84.9 IMMUNOCOMPROMISED: ICD-10-CM

## 2022-07-21 DIAGNOSIS — L40.59 POLYARTICULAR PSORIATIC ARTHRITIS: Primary | ICD-10-CM

## 2022-07-21 DIAGNOSIS — Z51.81 MEDICATION MONITORING ENCOUNTER: ICD-10-CM

## 2022-07-21 PROCEDURE — 99214 PR OFFICE/OUTPT VISIT, EST, LEVL IV, 30-39 MIN: ICD-10-PCS | Mod: 95,,, | Performed by: INTERNAL MEDICINE

## 2022-07-21 PROCEDURE — 1160F PR REVIEW ALL MEDS BY PRESCRIBER/CLIN PHARMACIST DOCUMENTED: ICD-10-PCS | Mod: CPTII,95,, | Performed by: INTERNAL MEDICINE

## 2022-07-21 PROCEDURE — 99214 OFFICE O/P EST MOD 30 MIN: CPT | Mod: 95,,, | Performed by: INTERNAL MEDICINE

## 2022-07-21 PROCEDURE — 1160F RVW MEDS BY RX/DR IN RCRD: CPT | Mod: CPTII,95,, | Performed by: INTERNAL MEDICINE

## 2022-07-21 PROCEDURE — 1159F PR MEDICATION LIST DOCUMENTED IN MEDICAL RECORD: ICD-10-PCS | Mod: CPTII,95,, | Performed by: INTERNAL MEDICINE

## 2022-07-21 PROCEDURE — 1159F MED LIST DOCD IN RCRD: CPT | Mod: CPTII,95,, | Performed by: INTERNAL MEDICINE

## 2022-07-21 NOTE — TELEPHONE ENCOUNTER
Florence requesting additional information.  Message sent to [facility] for assessment and documentation of improvement on Rinvoq in order to gain coverage for continuation of therapy.    Will continue to follow up.

## 2022-07-21 NOTE — PROGRESS NOTES
RHEUMATOLOGY FOLLOW UP - TELE VISIT     The patient location is: [place]  The chief complaint leading to consultation is:  Follow-up for psoriatic arthritis  Visit type: Virtual visit with synchronous audio and video  Total time spent with patient:  10 minutes  Each patient to whom he or she provides medical services by telemedicine is:  (1) informed of the relationship between the physician and patient and the respective role of any other health care provider with respect to management of the patient; and (2) notified that he or she may decline to receive medical services by telemedicine and may withdraw from such care at any time.    Chief complaints, HPI, ROS, EXAM, Assessment & Plans:-  Bee Champagne a 36 y.o. pleasant female seen today for follow-up visit.  She has psoriatic arthritis treated with Rinvoq after failure and intolerance to methotrexate therapy.  She reports significant improvement in her psoriatic arthritis since starting Rinvoq.  70% improvement in her joint pain, stiffness and swelling.  She had a pain of 10/10 at initial presentation which is only 3/10 now.  She has also noticed significant improvement in her fatigue and she has been working 2 jobs with improvement in her pain and energy level.  No adverse effects.  Rheumatological review of system negative otherwise.  Physical examination shows 100% fist formation bilateral hands with excellent range of motion of bilateral shoulders.    1. Polyarticular psoriatic arthritis    2. Medication monitoring encounter    3. Immunocompromised      Problem List Items Addressed This Visit     Polyarticular psoriatic arthritis - Primary      Other Visit Diagnoses     Medication monitoring encounter        Immunocompromised               70% improvement of psoriatic arthritis on Rinvoq.   Continue Rinvoq.   Compromised immune system secondary to autoimmune disease and use of immunosuppressive drugs. Monitor carefully for infections. Advised to get  immediate medical care if any infection. Also advised strict adherence to age appropriate vaccinations and cancer screenings with PCP.   Hold Rinvoq if any infection.   Safety labs every visit.        Past Medical History:   Diagnosis Date    Anxiety     Depression     Interstitial cystitis        Past Surgical History:   Procedure Laterality Date     SECTION      TUBAL LIGATION          Social History     Tobacco Use    Smoking status: Never Smoker    Smokeless tobacco: Never Used   Substance Use Topics    Alcohol use: Not Currently    Drug use: Never       Family History   Problem Relation Age of Onset    Cancer Mother     Heart disease Father     Diabetes Mellitus Father     Cancer Paternal Aunt     Hyperlipidemia Paternal Aunt        Review of patient's allergies indicates:   Allergen Reactions    Shellfish containing products        Medication List with Changes/Refills   Current Medications    CETIRIZINE (ZYRTEC) 10 MG TABLET    Take 10 mg by mouth.    D-MANNOSE 500 MG CAP    Take by mouth.    FOLIC ACID (FOLVITE) 1 MG TABLET    Take 1 tablet (1 mg total) by mouth once daily.    IRON BIS-GLY/FA/C/B12/CA/SUCC (IRON-150 ORAL)    Take by mouth.    LEVOCETIRIZINE (XYZAL) 5 MG TABLET    Take 5 mg by mouth every evening.    NATREXONE TABLET 4.5 MG    Take 1 tablet (4.5 mg total) by mouth every evening.    ONDANSETRON (ZOFRAN-ODT) 4 MG TBDL    Take 4 mg by mouth daily as needed.    UPADACITINIB (RINVOQ) 15 MG 24 HR TABLET    Take 1 tablet (15 mg total) by mouth once daily.    VIIBRYD 10 MG TAB TABLET    Take 10 mg by mouth once daily.       Disclaimer: This note was prepared using voice recognition system and is likely to have sound alike errors and is not proof read.  Please call me with any questions.

## 2022-07-21 NOTE — TELEPHONE ENCOUNTER
3rd attempt to contact patient to see how she was doing with rinvoq. No answer. lvm for patient to return call to clinic. Also sending patient portal message to patient DD

## 2022-07-22 NOTE — TELEPHONE ENCOUNTER
Chart notes from 7/21/22 submitted via fax to [phone] for urgent review.  Case ID: [id number]

## 2022-07-22 NOTE — TELEPHONE ENCOUNTER
Benefits Investigation - Rinvoq    Insurance name: [employer]  OSP in network? No  Must fill [facility] [phone]    Rep stated she cannot discuss copay with [facility] as we are not the dispensing pharmacy.

## 2022-07-22 NOTE — TELEPHONE ENCOUNTER
Transferring to [facility] per plan requirement.    Outgoing call to notify patient of approval, transfer, and instructions to sign up for Rinvoq copay card. No answer, VANE.

## 2022-07-22 NOTE — TELEPHONE ENCOUNTER
Pt returning call.  Informed pt Rinvoq was transferred to [facility] per plan requirement.  Also gave instructions to sign up for copay card. Pt verbalized understanding.

## 2022-08-03 ENCOUNTER — PATIENT MESSAGE (OUTPATIENT)
Dept: RHEUMATOLOGY | Facility: CLINIC | Age: 37
End: 2022-08-03
Payer: COMMERCIAL

## 2022-09-06 ENCOUNTER — LAB VISIT (OUTPATIENT)
Dept: LAB | Facility: HOSPITAL | Age: 37
End: 2022-09-06
Attending: PHYSICIAN ASSISTANT
Payer: COMMERCIAL

## 2022-09-06 DIAGNOSIS — Z51.81 MEDICATION MONITORING ENCOUNTER: ICD-10-CM

## 2022-09-06 DIAGNOSIS — M06.4 UNDIFFERENTIATED INFLAMMATORY ARTHRITIS: ICD-10-CM

## 2022-09-06 LAB
ALBUMIN SERPL BCP-MCNC: 3.4 G/DL (ref 3.5–5.2)
ALP SERPL-CCNC: 65 U/L (ref 55–135)
ALT SERPL W/O P-5'-P-CCNC: 10 U/L (ref 10–44)
ANION GAP SERPL CALC-SCNC: 9 MMOL/L (ref 8–16)
AST SERPL-CCNC: 13 U/L (ref 10–40)
BASOPHILS # BLD AUTO: 0.02 K/UL (ref 0–0.2)
BASOPHILS NFR BLD: 0.2 % (ref 0–1.9)
BILIRUB SERPL-MCNC: 0.2 MG/DL (ref 0.1–1)
BUN SERPL-MCNC: 8 MG/DL (ref 6–20)
CALCIUM SERPL-MCNC: 8.9 MG/DL (ref 8.7–10.5)
CHLORIDE SERPL-SCNC: 105 MMOL/L (ref 95–110)
CO2 SERPL-SCNC: 26 MMOL/L (ref 23–29)
CREAT SERPL-MCNC: 0.8 MG/DL (ref 0.5–1.4)
CRP SERPL-MCNC: 19.2 MG/L (ref 0–8.2)
DIFFERENTIAL METHOD: ABNORMAL
EOSINOPHIL # BLD AUTO: 0.1 K/UL (ref 0–0.5)
EOSINOPHIL NFR BLD: 1.6 % (ref 0–8)
ERYTHROCYTE [DISTWIDTH] IN BLOOD BY AUTOMATED COUNT: 13.3 % (ref 11.5–14.5)
ERYTHROCYTE [SEDIMENTATION RATE] IN BLOOD BY PHOTOMETRIC METHOD: 18 MM/HR (ref 0–36)
EST. GFR  (NO RACE VARIABLE): >60 ML/MIN/1.73 M^2
GLUCOSE SERPL-MCNC: 100 MG/DL (ref 70–110)
HCT VFR BLD AUTO: 34.6 % (ref 37–48.5)
HGB BLD-MCNC: 11.4 G/DL (ref 12–16)
IMM GRANULOCYTES # BLD AUTO: 0.03 K/UL (ref 0–0.04)
IMM GRANULOCYTES NFR BLD AUTO: 0.4 % (ref 0–0.5)
LYMPHOCYTES # BLD AUTO: 2.1 K/UL (ref 1–4.8)
LYMPHOCYTES NFR BLD: 25.8 % (ref 18–48)
MCH RBC QN AUTO: 28.1 PG (ref 27–31)
MCHC RBC AUTO-ENTMCNC: 32.9 G/DL (ref 32–36)
MCV RBC AUTO: 85 FL (ref 82–98)
MONOCYTES # BLD AUTO: 0.6 K/UL (ref 0.3–1)
MONOCYTES NFR BLD: 7.3 % (ref 4–15)
NEUTROPHILS # BLD AUTO: 5.3 K/UL (ref 1.8–7.7)
NEUTROPHILS NFR BLD: 64.7 % (ref 38–73)
NRBC BLD-RTO: 0 /100 WBC
PLATELET # BLD AUTO: 335 K/UL (ref 150–450)
PMV BLD AUTO: 8.7 FL (ref 9.2–12.9)
POTASSIUM SERPL-SCNC: 3.6 MMOL/L (ref 3.5–5.1)
PROT SERPL-MCNC: 7.1 G/DL (ref 6–8.4)
RBC # BLD AUTO: 4.06 M/UL (ref 4–5.4)
SODIUM SERPL-SCNC: 140 MMOL/L (ref 136–145)
WBC # BLD AUTO: 8.18 K/UL (ref 3.9–12.7)

## 2022-09-06 PROCEDURE — 80053 COMPREHEN METABOLIC PANEL: CPT | Performed by: PHYSICIAN ASSISTANT

## 2022-09-06 PROCEDURE — 85025 COMPLETE CBC W/AUTO DIFF WBC: CPT | Performed by: PHYSICIAN ASSISTANT

## 2022-09-06 PROCEDURE — 36415 COLL VENOUS BLD VENIPUNCTURE: CPT | Performed by: PHYSICIAN ASSISTANT

## 2022-09-06 PROCEDURE — 85652 RBC SED RATE AUTOMATED: CPT | Performed by: PHYSICIAN ASSISTANT

## 2022-09-06 PROCEDURE — 86140 C-REACTIVE PROTEIN: CPT | Performed by: PHYSICIAN ASSISTANT

## 2022-09-13 ENCOUNTER — OFFICE VISIT (OUTPATIENT)
Dept: RHEUMATOLOGY | Facility: CLINIC | Age: 37
End: 2022-09-13
Payer: COMMERCIAL

## 2022-09-13 VITALS
WEIGHT: 263.25 LBS | SYSTOLIC BLOOD PRESSURE: 136 MMHG | DIASTOLIC BLOOD PRESSURE: 89 MMHG | BODY MASS INDEX: 39.9 KG/M2 | HEIGHT: 68 IN | HEART RATE: 65 BPM

## 2022-09-13 DIAGNOSIS — D84.9 IMMUNOCOMPROMISED: ICD-10-CM

## 2022-09-13 DIAGNOSIS — L40.59 POLYARTICULAR PSORIATIC ARTHRITIS: Primary | ICD-10-CM

## 2022-09-13 DIAGNOSIS — Z51.81 MEDICATION MONITORING ENCOUNTER: ICD-10-CM

## 2022-09-13 DIAGNOSIS — M79.7 FIBROMYALGIA: ICD-10-CM

## 2022-09-13 PROCEDURE — 3079F DIAST BP 80-89 MM HG: CPT | Mod: CPTII,S$GLB,, | Performed by: PHYSICIAN ASSISTANT

## 2022-09-13 PROCEDURE — 3075F PR MOST RECENT SYSTOLIC BLOOD PRESS GE 130-139MM HG: ICD-10-PCS | Mod: CPTII,S$GLB,, | Performed by: PHYSICIAN ASSISTANT

## 2022-09-13 PROCEDURE — 99215 OFFICE O/P EST HI 40 MIN: CPT | Mod: S$GLB,,, | Performed by: PHYSICIAN ASSISTANT

## 2022-09-13 PROCEDURE — 3079F PR MOST RECENT DIASTOLIC BLOOD PRESSURE 80-89 MM HG: ICD-10-PCS | Mod: CPTII,S$GLB,, | Performed by: PHYSICIAN ASSISTANT

## 2022-09-13 PROCEDURE — 3075F SYST BP GE 130 - 139MM HG: CPT | Mod: CPTII,S$GLB,, | Performed by: PHYSICIAN ASSISTANT

## 2022-09-13 PROCEDURE — 1159F MED LIST DOCD IN RCRD: CPT | Mod: CPTII,S$GLB,, | Performed by: PHYSICIAN ASSISTANT

## 2022-09-13 PROCEDURE — 99999 PR PBB SHADOW E&M-EST. PATIENT-LVL III: CPT | Mod: PBBFAC,,, | Performed by: PHYSICIAN ASSISTANT

## 2022-09-13 PROCEDURE — 99215 PR OFFICE/OUTPT VISIT, EST, LEVL V, 40-54 MIN: ICD-10-PCS | Mod: S$GLB,,, | Performed by: PHYSICIAN ASSISTANT

## 2022-09-13 PROCEDURE — 3008F PR BODY MASS INDEX (BMI) DOCUMENTED: ICD-10-PCS | Mod: CPTII,S$GLB,, | Performed by: PHYSICIAN ASSISTANT

## 2022-09-13 PROCEDURE — 3008F BODY MASS INDEX DOCD: CPT | Mod: CPTII,S$GLB,, | Performed by: PHYSICIAN ASSISTANT

## 2022-09-13 PROCEDURE — 99999 PR PBB SHADOW E&M-EST. PATIENT-LVL III: ICD-10-PCS | Mod: PBBFAC,,, | Performed by: PHYSICIAN ASSISTANT

## 2022-09-13 PROCEDURE — 1159F PR MEDICATION LIST DOCUMENTED IN MEDICAL RECORD: ICD-10-PCS | Mod: CPTII,S$GLB,, | Performed by: PHYSICIAN ASSISTANT

## 2022-09-13 RX ORDER — RIZATRIPTAN BENZOATE 10 MG/1
TABLET ORAL
COMMUNITY
Start: 2022-09-08

## 2022-09-13 NOTE — PROGRESS NOTES
"     RHEUMATOLOGY OUTPATIENT CLINIC NOTE    9/13/2022    Attending Rheumatologist: Nereida Koroma PA-C  Primary Care Provider: Primary Doctor No   Chief Complaint/Reason For Consultation:  Polyarticular PSA      Subjective:        Bee Condon is a 36 y.o. female here today for follow up psoriatic arthritis on rinvoq snd fibromyalgia on LDN. She has been doing very well. Can now braid her and her daughter's hair on the same weekend instead of having to space it out weeks apart. Overall feels more energetic. Rheumatologic systems otherwise negative.  Physical exam shows no skin psoriasis. No active Raynaud's.  No dactylitis present on today's exam. Sig improvement in sed rate.    Rheum Hx: + family history of skin psoriasis.  History of positive SHANNON with a speckled pattern in the past she also has a history of elevated inflammatory markers.    Serologies  Positive SHANNON with speckled pattern-low titer  Lab Results   Component Value Date    RF <13.0 04/11/2022   Negative HLA B27  Current Rheum Medications:  Low-dose naltrexone, rinvoq  Previous Rheum Medications:   MTX (cannot tolerate), Cymbalta    Past Medical History:   Diagnosis Date    Anxiety     Depression     Interstitial cystitis 2019     Social History     Socioeconomic History    Marital status:    Tobacco Use    Smoking status: Never    Smokeless tobacco: Never   Substance and Sexual Activity    Alcohol use: Not Currently    Drug use: Never    Sexual activity: Yes     Review of patient's allergies indicates:   Allergen Reactions    Iodine     Shellfish containing products        Objective:   /89   Pulse 65   Ht 5' 8" (1.727 m)   Wt 119.4 kg (263 lb 3.7 oz)   BMI 40.02 kg/m²     Immunization History   Administered Date(s) Administered    COVID-19, MRNA, LN-S, PF (MODERNA FULL 0.5 ML DOSE) 02/27/2021, 03/25/2021, 11/08/2021       Current Outpatient Medications:     cetirizine (ZYRTEC) 10 MG tablet, Take 10 mg by mouth., Disp: , Rfl:     " d-mannose 500 mg Cap, Take by mouth., Disp: , Rfl:     folic acid (FOLVITE) 1 MG tablet, Take 1 tablet (1 mg total) by mouth once daily., Disp: 90 tablet, Rfl: 2    iron bis-gly/FA/C/B12/Ca/succ (IRON-150 ORAL), Take by mouth., Disp: , Rfl:     levocetirizine (XYZAL) 5 MG tablet, Take 5 mg by mouth every evening., Disp: , Rfl:     natrexone tablet 4.5 mg, Take 1 tablet (4.5 mg total) by mouth every evening., Disp: 90 tablet, Rfl: 3    ondansetron (ZOFRAN-ODT) 4 MG TbDL, Take 4 mg by mouth daily as needed., Disp: , Rfl:     rizatriptan (MAXALT) 10 MG tablet, Take by mouth., Disp: , Rfl:     upadacitinib (RINVOQ) 15 mg 24 hr tablet, Take 1 tablet (15 mg total) by mouth once daily., Disp: 30 tablet, Rfl: 11    VIIBRYD 10 mg Tab tablet, Take 10 mg by mouth once daily., Disp: , Rfl:       Assessment:     1. Polyarticular psoriatic arthritis    2. Medication monitoring encounter    3. Immunocompromised    4. Fibromyalgia            Plan:       PsA well controlled w/ rinvoq and well controlled fibro w/ LDN. Continue same.  Compromised immune system secondary to autoimmune disease and use of immunosuppressive drugs. Monitor carefully for infections and toxicities- Currently denies issues with recurrent infections. Advised to get immediate medical care if any infection.  Recommend Yearly Skin Cancer Screening by Dermatology for all patients on biologic or Kirk. advised strict adherence to age appropriate vaccinations (shingles, pneumonia, flu and covid) and cancer screenings with PCP   Patient advised to hold DMARD and/or biologic therapy for signs of infection or for surgery. If you are unsure what to do please call our office for instruction.[facility] [phone]  no current medication related issues, no evidence of toxicity. I ordered labs for toxicity monitoring;  results reviewed and discussed findings with the patient   Return to clinic:  4 months with safety labs    The patient understands, chooses  and consents to this plan and accepts all the risks which include but are not limited to the risks mentioned above.     Method of contact with patient concerns: [facility]    60 minutes of total time spent on the encounter, which includes face to face time and non-face to face time preparing to see the patient (eg, review of tests), Obtaining and/or reviewing separately obtained history, Documenting clinical information in the electronic or other health record, Independently interpreting results (not separately reported) and communicating results to the patient/family/caregiver, or Care coordination (not separately reported).          Disclaimer: This note was prepared using a voice recognition system and is likely to have sound alike errors within the text.       Nereida Koroma PA-C  Rheumatology Department   [facility]

## 2022-10-12 ENCOUNTER — PATIENT MESSAGE (OUTPATIENT)
Dept: RHEUMATOLOGY | Facility: CLINIC | Age: 37
End: 2022-10-12
Payer: COMMERCIAL

## 2022-10-23 ENCOUNTER — PATIENT MESSAGE (OUTPATIENT)
Dept: RHEUMATOLOGY | Facility: CLINIC | Age: 37
End: 2022-10-23
Payer: COMMERCIAL

## 2022-10-26 ENCOUNTER — OFFICE VISIT (OUTPATIENT)
Dept: PODIATRY | Facility: CLINIC | Age: 37
End: 2022-10-26
Payer: COMMERCIAL

## 2022-10-26 ENCOUNTER — HOSPITAL ENCOUNTER (OUTPATIENT)
Dept: RADIOLOGY | Facility: HOSPITAL | Age: 37
Discharge: HOME | End: 2022-10-26
Attending: PODIATRIST
Payer: COMMERCIAL

## 2022-10-26 VITALS
WEIGHT: 263 LBS | DIASTOLIC BLOOD PRESSURE: 81 MMHG | HEIGHT: 68 IN | BODY MASS INDEX: 39.86 KG/M2 | HEART RATE: 78 BPM | SYSTOLIC BLOOD PRESSURE: 123 MMHG

## 2022-10-26 DIAGNOSIS — M21.6X2 ACQUIRED EQUINUS DEFORMITY OF BOTH FEET: Primary | ICD-10-CM

## 2022-10-26 DIAGNOSIS — M79.672 HEEL PAIN, BILATERAL: ICD-10-CM

## 2022-10-26 DIAGNOSIS — M21.6X1 ACQUIRED EQUINUS DEFORMITY OF BOTH FEET: Primary | ICD-10-CM

## 2022-10-26 DIAGNOSIS — M79.671 HEEL PAIN, BILATERAL: ICD-10-CM

## 2022-10-26 DIAGNOSIS — R23.4 FISSURE IN SKIN OF BOTH FEET: ICD-10-CM

## 2022-10-26 PROCEDURE — 1160F RVW MEDS BY RX/DR IN RCRD: CPT | Mod: CPTII,S$GLB,, | Performed by: PODIATRIST

## 2022-10-26 PROCEDURE — 1159F PR MEDICATION LIST DOCUMENTED IN MEDICAL RECORD: ICD-10-PCS | Mod: CPTII,S$GLB,, | Performed by: PODIATRIST

## 2022-10-26 PROCEDURE — 3079F PR MOST RECENT DIASTOLIC BLOOD PRESSURE 80-89 MM HG: ICD-10-PCS | Mod: CPTII,S$GLB,, | Performed by: PODIATRIST

## 2022-10-26 PROCEDURE — 3079F DIAST BP 80-89 MM HG: CPT | Mod: CPTII,S$GLB,, | Performed by: PODIATRIST

## 2022-10-26 PROCEDURE — 1159F MED LIST DOCD IN RCRD: CPT | Mod: CPTII,S$GLB,, | Performed by: PODIATRIST

## 2022-10-26 PROCEDURE — 99999 PR PBB SHADOW E&M-EST. PATIENT-LVL III: CPT | Mod: PBBFAC,,, | Performed by: PODIATRIST

## 2022-10-26 PROCEDURE — 3074F PR MOST RECENT SYSTOLIC BLOOD PRESSURE < 130 MM HG: ICD-10-PCS | Mod: CPTII,S$GLB,, | Performed by: PODIATRIST

## 2022-10-26 PROCEDURE — 99203 PR OFFICE/OUTPT VISIT, NEW, LEVL III, 30-44 MIN: ICD-10-PCS | Mod: S$GLB,,, | Performed by: PODIATRIST

## 2022-10-26 PROCEDURE — 3074F SYST BP LT 130 MM HG: CPT | Mod: CPTII,S$GLB,, | Performed by: PODIATRIST

## 2022-10-26 PROCEDURE — 1160F PR REVIEW ALL MEDS BY PRESCRIBER/CLIN PHARMACIST DOCUMENTED: ICD-10-PCS | Mod: CPTII,S$GLB,, | Performed by: PODIATRIST

## 2022-10-26 PROCEDURE — 73630 X-RAY EXAM OF FOOT: CPT | Mod: 26,,, | Performed by: RADIOLOGY

## 2022-10-26 PROCEDURE — 99999 PR PBB SHADOW E&M-EST. PATIENT-LVL III: ICD-10-PCS | Mod: PBBFAC,,, | Performed by: PODIATRIST

## 2022-10-26 PROCEDURE — 3008F BODY MASS INDEX DOCD: CPT | Mod: CPTII,S$GLB,, | Performed by: PODIATRIST

## 2022-10-26 PROCEDURE — 3008F PR BODY MASS INDEX (BMI) DOCUMENTED: ICD-10-PCS | Mod: CPTII,S$GLB,, | Performed by: PODIATRIST

## 2022-10-26 PROCEDURE — 99203 OFFICE O/P NEW LOW 30 MIN: CPT | Mod: S$GLB,,, | Performed by: PODIATRIST

## 2022-10-26 PROCEDURE — 73630 XR FOOT COMPLETE 3 VIEW BILATERAL: ICD-10-PCS | Mod: 26,,, | Performed by: RADIOLOGY

## 2022-10-26 PROCEDURE — 73630 X-RAY EXAM OF FOOT: CPT | Mod: TC,50

## 2022-12-14 ENCOUNTER — PATIENT MESSAGE (OUTPATIENT)
Dept: RHEUMATOLOGY | Facility: CLINIC | Age: 37
End: 2022-12-14
Payer: COMMERCIAL

## 2023-01-06 ENCOUNTER — LAB VISIT (OUTPATIENT)
Dept: LAB | Facility: HOSPITAL | Age: 38
End: 2023-01-06
Attending: PHYSICIAN ASSISTANT
Payer: COMMERCIAL

## 2023-01-06 DIAGNOSIS — Z51.81 MEDICATION MONITORING ENCOUNTER: ICD-10-CM

## 2023-01-06 DIAGNOSIS — M06.4 UNDIFFERENTIATED INFLAMMATORY ARTHRITIS: ICD-10-CM

## 2023-01-06 LAB
ALBUMIN SERPL BCP-MCNC: 3.7 G/DL (ref 3.5–5.2)
ALP SERPL-CCNC: 89 U/L (ref 55–135)
ALT SERPL W/O P-5'-P-CCNC: 11 U/L (ref 10–44)
ANION GAP SERPL CALC-SCNC: 10 MMOL/L (ref 8–16)
AST SERPL-CCNC: 14 U/L (ref 10–40)
BASOPHILS # BLD AUTO: 0.04 K/UL (ref 0–0.2)
BASOPHILS NFR BLD: 0.5 % (ref 0–1.9)
BILIRUB SERPL-MCNC: 0.3 MG/DL (ref 0.1–1)
BUN SERPL-MCNC: 8 MG/DL (ref 6–20)
CALCIUM SERPL-MCNC: 9.2 MG/DL (ref 8.7–10.5)
CHLORIDE SERPL-SCNC: 104 MMOL/L (ref 95–110)
CO2 SERPL-SCNC: 26 MMOL/L (ref 23–29)
CREAT SERPL-MCNC: 1 MG/DL (ref 0.5–1.4)
CRP SERPL-MCNC: 19.8 MG/L (ref 0–8.2)
DIFFERENTIAL METHOD: ABNORMAL
EOSINOPHIL # BLD AUTO: 0.2 K/UL (ref 0–0.5)
EOSINOPHIL NFR BLD: 2 % (ref 0–8)
ERYTHROCYTE [DISTWIDTH] IN BLOOD BY AUTOMATED COUNT: 12.9 % (ref 11.5–14.5)
ERYTHROCYTE [SEDIMENTATION RATE] IN BLOOD BY PHOTOMETRIC METHOD: 15 MM/HR (ref 0–36)
EST. GFR  (NO RACE VARIABLE): >60 ML/MIN/1.73 M^2
GLUCOSE SERPL-MCNC: 95 MG/DL (ref 70–110)
HCT VFR BLD AUTO: 37.4 % (ref 37–48.5)
HGB BLD-MCNC: 12.1 G/DL (ref 12–16)
IMM GRANULOCYTES # BLD AUTO: 0.02 K/UL (ref 0–0.04)
IMM GRANULOCYTES NFR BLD AUTO: 0.3 % (ref 0–0.5)
LYMPHOCYTES # BLD AUTO: 2 K/UL (ref 1–4.8)
LYMPHOCYTES NFR BLD: 26.2 % (ref 18–48)
MCH RBC QN AUTO: 27.5 PG (ref 27–31)
MCHC RBC AUTO-ENTMCNC: 32.4 G/DL (ref 32–36)
MCV RBC AUTO: 85 FL (ref 82–98)
MONOCYTES # BLD AUTO: 0.5 K/UL (ref 0.3–1)
MONOCYTES NFR BLD: 6 % (ref 4–15)
NEUTROPHILS # BLD AUTO: 4.8 K/UL (ref 1.8–7.7)
NEUTROPHILS NFR BLD: 65 % (ref 38–73)
NRBC BLD-RTO: 0 /100 WBC
PLATELET # BLD AUTO: 431 K/UL (ref 150–450)
PMV BLD AUTO: 8.3 FL (ref 9.2–12.9)
POTASSIUM SERPL-SCNC: 4.4 MMOL/L (ref 3.5–5.1)
PROT SERPL-MCNC: 7.7 G/DL (ref 6–8.4)
RBC # BLD AUTO: 4.4 M/UL (ref 4–5.4)
SODIUM SERPL-SCNC: 140 MMOL/L (ref 136–145)
WBC # BLD AUTO: 7.45 K/UL (ref 3.9–12.7)

## 2023-01-06 PROCEDURE — 85025 COMPLETE CBC W/AUTO DIFF WBC: CPT | Performed by: PHYSICIAN ASSISTANT

## 2023-01-06 PROCEDURE — 86140 C-REACTIVE PROTEIN: CPT | Performed by: PHYSICIAN ASSISTANT

## 2023-01-06 PROCEDURE — 36415 COLL VENOUS BLD VENIPUNCTURE: CPT | Performed by: PHYSICIAN ASSISTANT

## 2023-01-06 PROCEDURE — 85652 RBC SED RATE AUTOMATED: CPT | Performed by: PHYSICIAN ASSISTANT

## 2023-01-06 PROCEDURE — 80053 COMPREHEN METABOLIC PANEL: CPT | Performed by: PHYSICIAN ASSISTANT

## 2023-01-18 ENCOUNTER — PATIENT MESSAGE (OUTPATIENT)
Dept: RHEUMATOLOGY | Facility: CLINIC | Age: 38
End: 2023-01-18

## 2023-01-18 ENCOUNTER — OFFICE VISIT (OUTPATIENT)
Dept: RHEUMATOLOGY | Facility: CLINIC | Age: 38
End: 2023-01-18
Payer: COMMERCIAL

## 2023-01-18 VITALS
HEART RATE: 73 BPM | WEIGHT: 272.94 LBS | HEIGHT: 68 IN | DIASTOLIC BLOOD PRESSURE: 87 MMHG | SYSTOLIC BLOOD PRESSURE: 133 MMHG | BODY MASS INDEX: 41.36 KG/M2

## 2023-01-18 DIAGNOSIS — Z51.81 MEDICATION MONITORING ENCOUNTER: ICD-10-CM

## 2023-01-18 DIAGNOSIS — L40.59 POLYARTICULAR PSORIATIC ARTHRITIS: Primary | ICD-10-CM

## 2023-01-18 DIAGNOSIS — D84.9 IMMUNOCOMPROMISED: ICD-10-CM

## 2023-01-18 DIAGNOSIS — M54.50 CHRONIC BILATERAL LOW BACK PAIN, UNSPECIFIED WHETHER SCIATICA PRESENT: ICD-10-CM

## 2023-01-18 DIAGNOSIS — G89.29 CHRONIC BILATERAL LOW BACK PAIN, UNSPECIFIED WHETHER SCIATICA PRESENT: ICD-10-CM

## 2023-01-18 DIAGNOSIS — M79.7 FIBROMYALGIA: ICD-10-CM

## 2023-01-18 PROBLEM — M06.4 UNDIFFERENTIATED INFLAMMATORY ARTHRITIS: Status: RESOLVED | Noted: 2022-04-12 | Resolved: 2023-01-18

## 2023-01-18 PROCEDURE — 3079F DIAST BP 80-89 MM HG: CPT | Mod: CPTII,S$GLB,, | Performed by: PHYSICIAN ASSISTANT

## 2023-01-18 PROCEDURE — 99999 PR PBB SHADOW E&M-EST. PATIENT-LVL III: CPT | Mod: PBBFAC,,, | Performed by: PHYSICIAN ASSISTANT

## 2023-01-18 PROCEDURE — 3075F PR MOST RECENT SYSTOLIC BLOOD PRESS GE 130-139MM HG: ICD-10-PCS | Mod: CPTII,S$GLB,, | Performed by: PHYSICIAN ASSISTANT

## 2023-01-18 PROCEDURE — 3008F BODY MASS INDEX DOCD: CPT | Mod: CPTII,S$GLB,, | Performed by: PHYSICIAN ASSISTANT

## 2023-01-18 PROCEDURE — 99214 PR OFFICE/OUTPT VISIT, EST, LEVL IV, 30-39 MIN: ICD-10-PCS | Mod: S$GLB,,, | Performed by: PHYSICIAN ASSISTANT

## 2023-01-18 PROCEDURE — 3075F SYST BP GE 130 - 139MM HG: CPT | Mod: CPTII,S$GLB,, | Performed by: PHYSICIAN ASSISTANT

## 2023-01-18 PROCEDURE — 3079F PR MOST RECENT DIASTOLIC BLOOD PRESSURE 80-89 MM HG: ICD-10-PCS | Mod: CPTII,S$GLB,, | Performed by: PHYSICIAN ASSISTANT

## 2023-01-18 PROCEDURE — 99999 PR PBB SHADOW E&M-EST. PATIENT-LVL III: ICD-10-PCS | Mod: PBBFAC,,, | Performed by: PHYSICIAN ASSISTANT

## 2023-01-18 PROCEDURE — 1159F PR MEDICATION LIST DOCUMENTED IN MEDICAL RECORD: ICD-10-PCS | Mod: CPTII,S$GLB,, | Performed by: PHYSICIAN ASSISTANT

## 2023-01-18 PROCEDURE — 1159F MED LIST DOCD IN RCRD: CPT | Mod: CPTII,S$GLB,, | Performed by: PHYSICIAN ASSISTANT

## 2023-01-18 PROCEDURE — 3008F PR BODY MASS INDEX (BMI) DOCUMENTED: ICD-10-PCS | Mod: CPTII,S$GLB,, | Performed by: PHYSICIAN ASSISTANT

## 2023-01-18 PROCEDURE — 99214 OFFICE O/P EST MOD 30 MIN: CPT | Mod: S$GLB,,, | Performed by: PHYSICIAN ASSISTANT

## 2023-01-18 RX ORDER — VILAZODONE HYDROCHLORIDE 20 MG/1
1 TABLET ORAL
COMMUNITY
Start: 2023-01-16 | End: 2023-01-18 | Stop reason: SDUPTHER

## 2023-01-18 NOTE — PROGRESS NOTES
"     RHEUMATOLOGY OUTPATIENT CLINIC NOTE    Attending Rheumatologist: Nereida Koroma PA-C  Primary Care Provider: SIMONA Carr   Chief Complaint/Reason For Consultation:  PsA    Subjective:        Bee Condon is a 37 y.o. female here today for follow up psoriatic arthritis on rinvoq and fibromyalgia on LDN. She has been doing very well. Overall feels more energetic but still has some fatigue- worse with more activity. Rheumatologic systems otherwise negative.  Physical exam shows no skin psoriasis. No active Raynaud's.  No dactylitis present on today's exam. Sig improvement in sed rate. C/o low back pain worse w/ activity- would like to try PT.    Rheum Hx: + family history of skin psoriasis.  History of positive SHANNON with a speckled pattern in the past she also has a history of elevated inflammatory markers.    No pain in the mornings after waking up, stiffness has resolved after she moves to the bathroom to get ready for the day.     Serologies  Positive SHANNON with speckled pattern-low titer  Lab Results   Component Value Date    RF <13.0 04/11/2022     Lab Results   Component Value Date    HLABB27 Negative 04/11/2022     Lab Results   Component Value Date    CCPANTIBODIE <0.5 04/11/2022     Lab Results   Component Value Date    ANASCREEN Negative <1:80 04/11/2022       Current Rheum Medications:  Low-dose naltrexone, rinvoq  Previous Rheum Medications:   MTX (cannot tolerate), Cymbalta    Past Medical History:   Diagnosis Date    Anxiety     Depression     Interstitial cystitis 2019     Social History     Tobacco Use    Smoking status: Never    Smokeless tobacco: Never   Substance Use Topics    Alcohol use: Not Currently       Review of patient's allergies indicates:   Allergen Reactions    Iodine     Shellfish containing products        Objective:   /87 (BP Location: Right arm, Patient Position: Sitting)   Pulse 73   Ht 5' 8" (1.727 m)   Wt 123.8 kg (272 lb 14.9 oz)   BMI 41.50 kg/m² "     Immunization History   Administered Date(s) Administered    COVID-19, MRNA, LN-S, PF (MODERNA FULL 0.5 ML DOSE) 02/27/2021, 03/25/2021, 11/08/2021     Current Outpatient Medications   Medication Instructions    cetirizine (ZYRTEC) 10 mg, Oral    d-mannose 500 mg Cap Oral    folic acid (FOLVITE) 1 MG tablet TAKE 1 TABLET(1 MG) BY MOUTH EVERY DAY    iron bis-gly/FA/C/B12/Ca/succ (IRON-150 ORAL) Oral    levocetirizine (XYZAL) 5 mg, Oral, Nightly    natrexone tablet 4.5 mg 4.5 mg, Oral, Nightly    ondansetron (ZOFRAN-ODT) 4 mg, Oral, Daily PRN    RINVOQ 15 mg, Oral, Daily    rizatriptan (MAXALT) 10 MG tablet Oral    VIIBRYD 10 mg, Oral, Daily     Assessment:     1. Polyarticular psoriatic arthritis    2. Medication monitoring encounter    3. Immunocompromised    4. Fibromyalgia    5. Chronic bilateral low back pain, unspecified whether sciatica present         Plan:       PsA well controlled w/ rinvoq and well controlled fibro w/ LDN. Continue same.  Encouraged trial gluten free and dairy free  Start PT for low back pain  Compromised immune system secondary to autoimmune disease and use of immunosuppressive drugs. Monitor carefully for infections and toxicities- Currently denies issues with recurrent infections. Advised to get immediate medical care if any infection.  Recommend Yearly Skin Cancer Screening by Dermatology for all patients on biologic or Kirk. advised strict adherence to age appropriate vaccinations (shingles, pneumonia, flu and covid) and cancer screenings with PCP   Patient advised to hold DMARD and/or biologic therapy for signs of infection or for surgery. If you are unsure what to do please call our office for instruction.[facility] [phone]  no current medication related issues, no evidence of toxicity. I ordered labs for toxicity monitoring;  results reviewed and discussed findings with the patient   Return to clinic:  4 months with safety labs 2-3 days prior    The patient  understands, chooses and consents to this plan and accepts all the risks which include but are not limited to the risks mentioned above.     Method of contact with patient concerns: [facility]    30 minutes of total time spent on the encounter, which includes face to face time and non-face to face time preparing to see the patient (eg, review of tests), Obtaining and/or reviewing separately obtained history, Documenting clinical information in the electronic or other health record, Independently interpreting results (not separately reported) and communicating results to the patient/family/caregiver, or Care coordination (not separately reported).          Disclaimer: This note was prepared using a voice recognition system and is likely to have sound alike errors within the text.       Nereida Koroma PA-C  Rheumatology Department   [facility]

## 2023-02-15 ENCOUNTER — PATIENT MESSAGE (OUTPATIENT)
Dept: RHEUMATOLOGY | Facility: CLINIC | Age: 38
End: 2023-02-15
Payer: COMMERCIAL

## 2023-02-15 DIAGNOSIS — Z51.81 MEDICATION MONITORING ENCOUNTER: ICD-10-CM

## 2023-02-15 RX ORDER — UPADACITINIB 15 MG/1
15 TABLET, EXTENDED RELEASE ORAL DAILY
Qty: 30 TABLET | Refills: 11 | Status: SHIPPED | OUTPATIENT
Start: 2023-02-15 | End: 2023-02-17 | Stop reason: SDUPTHER

## 2023-02-15 NOTE — TELEPHONE ENCOUNTER
----- Message from Alisa Macias sent at 2/15/2023 10:42 AM CST -----  Contact: Sapna/ Payer thomas Alejo is requesting the patient's Rinvoq be sent to [facility]. Their phone number is [phone]. Please call with any questions at [phone].

## 2023-02-17 ENCOUNTER — PATIENT MESSAGE (OUTPATIENT)
Dept: RHEUMATOLOGY | Facility: CLINIC | Age: 38
End: 2023-02-17
Payer: COMMERCIAL

## 2023-02-17 DIAGNOSIS — Z51.81 MEDICATION MONITORING ENCOUNTER: ICD-10-CM

## 2023-02-17 RX ORDER — UPADACITINIB 15 MG/1
15 TABLET, EXTENDED RELEASE ORAL DAILY
Qty: 30 TABLET | Refills: 11 | Status: SHIPPED | OUTPATIENT
Start: 2023-02-17 | End: 2023-06-08 | Stop reason: ALTCHOICE

## 2023-02-20 ENCOUNTER — SPECIALTY PHARMACY (OUTPATIENT)
Dept: PHARMACY | Facility: CLINIC | Age: 38
End: 2023-02-20
Payer: COMMERCIAL

## 2023-02-20 NOTE — TELEPHONE ENCOUNTER
Prior authorization for Rinvoq approved through 7/21/23. Patient currently on Rinvoq and has been filling the medication through [facility].    Forwarding refills to [facility] and closing out at [facility].

## 2023-02-24 ENCOUNTER — TELEPHONE (OUTPATIENT)
Dept: RHEUMATOLOGY | Facility: CLINIC | Age: 38
End: 2023-02-24
Payer: COMMERCIAL

## 2023-02-24 NOTE — TELEPHONE ENCOUNTER
----- Message from Ck Daniel sent at 2/24/2023 10:33 AM CST -----  Pharmacy received a cancellation request for meds listed below     They want to confirm that it was so          upadacitinib (RINVOQ) 15 mg 24 hr tablet     Pls call them to confirm         [facility] - [place] [id number]   [address] [facility] [address]  Phone: [phone] Fax: [phone]      Thank you

## 2023-03-22 ENCOUNTER — PATIENT MESSAGE (OUTPATIENT)
Dept: RHEUMATOLOGY | Facility: CLINIC | Age: 38
End: 2023-03-22
Payer: COMMERCIAL

## 2023-03-30 ENCOUNTER — OFFICE VISIT (OUTPATIENT)
Dept: GASTROENTEROLOGY | Facility: CLINIC | Age: 38
End: 2023-03-30
Payer: COMMERCIAL

## 2023-03-30 ENCOUNTER — HOSPITAL ENCOUNTER (OUTPATIENT)
Dept: RADIOLOGY | Facility: HOSPITAL | Age: 38
Discharge: HOME | End: 2023-03-30
Attending: PHYSICIAN ASSISTANT
Payer: COMMERCIAL

## 2023-03-30 VITALS
HEIGHT: 68 IN | WEIGHT: 272.81 LBS | HEART RATE: 99 BPM | DIASTOLIC BLOOD PRESSURE: 86 MMHG | SYSTOLIC BLOOD PRESSURE: 137 MMHG | BODY MASS INDEX: 41.34 KG/M2

## 2023-03-30 DIAGNOSIS — Z87.19 HISTORY OF ESOPHAGEAL REFLUX: ICD-10-CM

## 2023-03-30 DIAGNOSIS — R10.9 ABDOMINAL PAIN, UNSPECIFIED ABDOMINAL LOCATION: ICD-10-CM

## 2023-03-30 DIAGNOSIS — R19.7 DIARRHEA, UNSPECIFIED TYPE: ICD-10-CM

## 2023-03-30 DIAGNOSIS — R07.2 SUBSTERNAL PAIN: Primary | ICD-10-CM

## 2023-03-30 DIAGNOSIS — R07.2 SUBSTERNAL PAIN: ICD-10-CM

## 2023-03-30 PROCEDURE — 74019 RADEX ABDOMEN 2 VIEWS: CPT | Mod: 26,,, | Performed by: RADIOLOGY

## 2023-03-30 PROCEDURE — 3008F PR BODY MASS INDEX (BMI) DOCUMENTED: ICD-10-PCS | Mod: CPTII,S$GLB,, | Performed by: PHYSICIAN ASSISTANT

## 2023-03-30 PROCEDURE — 74019 RADEX ABDOMEN 2 VIEWS: CPT | Mod: TC

## 2023-03-30 PROCEDURE — 1159F PR MEDICATION LIST DOCUMENTED IN MEDICAL RECORD: ICD-10-PCS | Mod: CPTII,S$GLB,, | Performed by: PHYSICIAN ASSISTANT

## 2023-03-30 PROCEDURE — 3075F SYST BP GE 130 - 139MM HG: CPT | Mod: CPTII,S$GLB,, | Performed by: PHYSICIAN ASSISTANT

## 2023-03-30 PROCEDURE — 99999 PR PBB SHADOW E&M-EST. PATIENT-LVL IV: CPT | Mod: PBBFAC,,, | Performed by: PHYSICIAN ASSISTANT

## 2023-03-30 PROCEDURE — 3008F BODY MASS INDEX DOCD: CPT | Mod: CPTII,S$GLB,, | Performed by: PHYSICIAN ASSISTANT

## 2023-03-30 PROCEDURE — 99203 OFFICE O/P NEW LOW 30 MIN: CPT | Mod: S$GLB,,, | Performed by: PHYSICIAN ASSISTANT

## 2023-03-30 PROCEDURE — 1159F MED LIST DOCD IN RCRD: CPT | Mod: CPTII,S$GLB,, | Performed by: PHYSICIAN ASSISTANT

## 2023-03-30 PROCEDURE — 99999 PR PBB SHADOW E&M-EST. PATIENT-LVL IV: ICD-10-PCS | Mod: PBBFAC,,, | Performed by: PHYSICIAN ASSISTANT

## 2023-03-30 PROCEDURE — 3075F PR MOST RECENT SYSTOLIC BLOOD PRESS GE 130-139MM HG: ICD-10-PCS | Mod: CPTII,S$GLB,, | Performed by: PHYSICIAN ASSISTANT

## 2023-03-30 PROCEDURE — 3079F DIAST BP 80-89 MM HG: CPT | Mod: CPTII,S$GLB,, | Performed by: PHYSICIAN ASSISTANT

## 2023-03-30 PROCEDURE — 99203 PR OFFICE/OUTPT VISIT, NEW, LEVL III, 30-44 MIN: ICD-10-PCS | Mod: S$GLB,,, | Performed by: PHYSICIAN ASSISTANT

## 2023-03-30 PROCEDURE — 74019 XR ABDOMEN FLAT AND ERECT: ICD-10-PCS | Mod: 26,,, | Performed by: RADIOLOGY

## 2023-03-30 PROCEDURE — 3079F PR MOST RECENT DIASTOLIC BLOOD PRESSURE 80-89 MM HG: ICD-10-PCS | Mod: CPTII,S$GLB,, | Performed by: PHYSICIAN ASSISTANT

## 2023-03-30 RX ORDER — LISDEXAMFETAMINE DIMESYLATE 30 MG/1
CAPSULE ORAL
COMMUNITY
Start: 2023-03-17

## 2023-03-30 RX ORDER — PANTOPRAZOLE SODIUM 40 MG/1
40 TABLET, DELAYED RELEASE ORAL DAILY
Qty: 30 TABLET | Refills: 2 | Status: SHIPPED | OUTPATIENT
Start: 2023-03-30 | End: 2023-06-21

## 2023-03-30 NOTE — PROGRESS NOTES
Clinic Consult:  [facility]    Reason for Consult:  The primary encounter diagnosis was Substernal pain. Diagnoses of History of esophageal reflux, Abdominal pain, unspecified abdominal location, and Diarrhea, unspecified type were also pertinent to this visit.    PCP: Germania Hernandez   No address on file    CC: Abdominal Pain (Burning sensation/soft stools)      HPI:  This is a 37 y.o. female with history of polyarticular psoriatic arthritis here for evaluation of the above. History of abdominal pains x 2 years. A lot resolved with anxiety medications. Used to avoid eating a lot. Beginning of this year, stomach has started becoming more sensitive. Progressed this past month. Can be with or without food. Pain located to top of stomach - tight. Gas and bloating. Started on Rinvoq 10 months ago for the LANRE. Bowels are mushy - this began about 3-4 months ago. Not typical for her. 3-4x a day. Denies any constipation. Can be painful to the top her abdomen with having a BM. Pain improved with BM. Denies blood in the stool. No fevers. Bowels typically get loose when her stomach acts up. Can take months to resolve.  Endocrine testing her for Addisons.    Has never had scopes.   Had low iron prior to hysterectomy this past December.     Review of Systems   Constitutional:  Negative for activity change, appetite change, chills, diaphoresis, fatigue, fever and unexpected weight change.   HENT:  Negative for trouble swallowing.    Respiratory:  Negative for shortness of breath.    Cardiovascular:  Negative for chest pain.   Gastrointestinal:  Positive for abdominal pain and diarrhea. Negative for abdominal distention (+ bloat), anal bleeding, blood in stool, constipation, nausea and vomiting.   Genitourinary:  Negative for dysuria and hematuria.   Skin:  Negative for color change and pallor.   Neurological:  Negative for dizziness, weakness and light-headedness.   Psychiatric/Behavioral:   Negative for dysphoric mood. The patient is not nervous/anxious.       Medical History:   Past Medical History:   Diagnosis Date    Anxiety     Depression     Interstitial cystitis 2019       Surgical History:   Past Surgical History:   Procedure Laterality Date     SECTION      TUBAL LIGATION         Family History:    Family History   Problem Relation Age of Onset    Cancer Mother     Heart disease Father     Diabetes Mellitus Father     Cancer Paternal Aunt     Hyperlipidemia Paternal Aunt        Social History:   Social History     Socioeconomic History    Marital status:    Tobacco Use    Smoking status: Never    Smokeless tobacco: Never   Substance and Sexual Activity    Alcohol use: Not Currently    Drug use: Never    Sexual activity: Yes       Allergies:   Review of patient's allergies indicates:   Allergen Reactions    Iodine     Oxycodone-acetaminophen Hives and Itching    Shellfish containing products        Home Medications:   Current Outpatient Medications on File Prior to Visit   Medication Sig Dispense Refill    cetirizine (ZYRTEC) 10 MG tablet Take 10 mg by mouth.      d-mannose 500 mg Cap Take by mouth.      folic acid (FOLVITE) 1 MG tablet TAKE 1 TABLET(1 MG) BY MOUTH EVERY DAY 90 tablet 3    iron bis-gly/FA/C/B12/Ca/succ (IRON-150 ORAL) Take by mouth.      levocetirizine (XYZAL) 5 MG tablet Take 5 mg by mouth every evening.      lisdexamfetamine (VYVANSE) 30 MG capsule       natrexone tablet 4.5 mg Take 1 tablet (4.5 mg total) by mouth every evening. 90 tablet 3    ondansetron (ZOFRAN-ODT) 4 MG TbDL Take 4 mg by mouth daily as needed.      rizatriptan (MAXALT) 10 MG tablet Take by mouth.      VIIBRYD 10 mg Tab tablet Take 10 mg by mouth once daily.      upadacitinib (RINVOQ) 15 mg 24 hr tablet Take 1 tablet (15 mg total) by mouth once daily. (Patient not taking: Reported on 3/30/2023) 30 tablet 11     No current facility-administered medications on file prior to visit.  "      Physical Exam:  /86   Pulse 99   Ht 5' 8" (1.727 m)   Wt 123.8 kg (272 lb 13.1 oz)   BMI 41.48 kg/m²   Body mass index is 41.48 kg/m².  Physical Exam  Constitutional:       General: She is not in acute distress.     Appearance: Normal appearance. She is not ill-appearing, toxic-appearing or diaphoretic.   HENT:      Head: Normocephalic and atraumatic.   Eyes:      General: No scleral icterus.     Extraocular Movements: Extraocular movements intact.   Cardiovascular:      Rate and Rhythm: Normal rate and regular rhythm.   Pulmonary:      Effort: Pulmonary effort is normal. No respiratory distress.      Breath sounds: Normal breath sounds.   Abdominal:      General: Bowel sounds are normal. There is no distension.      Palpations: Abdomen is soft. There is no mass.      Tenderness: There is abdominal tenderness (substernal/epigastric and left abdomen). There is no guarding or rebound.      Hernia: No hernia is present.   Musculoskeletal:         General: Normal range of motion.      Cervical back: Normal range of motion.   Skin:     General: Skin is warm and dry.      Coloration: Skin is not jaundiced or pale.   Neurological:      Mental Status: She is alert and oriented to person, place, and time.         Labs: Pertinent labs reviewed.  Endoscopy: --  CRC Screening: --  Anticoagulation: --    Assessment:  1. Substernal pain    2. History of esophageal reflux    3. Abdominal pain, unspecified abdominal location    4. Diarrhea, unspecified type         Recommendations:  -Start on daily Protonix given substernal discomfort worse after eating.  -Xray of abdomen given progression of abdominal pain  -Stool studies to rule out infectious cause. Add h. pylori given stomach pain. Add calprotectin given + autoimmune history with diarrhea.  -Follow up after imaging and stools resulted.     Substernal pain  -     pantoprazole (PROTONIX) 40 MG tablet; Take 1 tablet (40 mg total) by mouth once daily.  Dispense: 30 " tablet; Refill: 2  -     X-Ray Abdomen Flat And Erect; Future; Expected date: 03/30/2023    History of esophageal reflux  -     pantoprazole (PROTONIX) 40 MG tablet; Take 1 tablet (40 mg total) by mouth once daily.  Dispense: 30 tablet; Refill: 2  -     X-Ray Abdomen Flat And Erect; Future; Expected date: 03/30/2023    Abdominal pain, unspecified abdominal location  -     pantoprazole (PROTONIX) 40 MG tablet; Take 1 tablet (40 mg total) by mouth once daily.  Dispense: 30 tablet; Refill: 2  -     Calprotectin, Stool; Future; Expected date: 03/30/2023  -     H. pylori antigen, stool; Future; Expected date: 03/30/2023  -     Stool Exam-Ova,Cysts,Parasites; Future; Expected date: 04/13/2023  -     Stool culture; Future; Expected date: 03/30/2023  -     Giardia / Cryptosporidum, EIA; Future; Expected date: 03/30/2023  -     Clostridium difficile EIA; Future; Expected date: 03/30/2023  -     X-Ray Abdomen Flat And Erect; Future; Expected date: 03/30/2023    Diarrhea, unspecified type  -     Calprotectin, Stool; Future; Expected date: 03/30/2023  -     H. pylori antigen, stool; Future; Expected date: 03/30/2023  -     Stool Exam-Ova,Cysts,Parasites; Future; Expected date: 04/13/2023  -     Stool culture; Future; Expected date: 03/30/2023  -     Giardia / Cryptosporidum, EIA; Future; Expected date: 03/30/2023  -     Clostridium difficile EIA; Future; Expected date: 03/30/2023  -     X-Ray Abdomen Flat And Erect; Future; Expected date: 03/30/2023        No follow-ups on file.    Thank you so much for allowing me to participate in the care of Bee Grissom PA-C

## 2023-04-13 ENCOUNTER — PATIENT MESSAGE (OUTPATIENT)
Dept: GASTROENTEROLOGY | Facility: CLINIC | Age: 38
End: 2023-04-13
Payer: COMMERCIAL

## 2023-04-14 ENCOUNTER — PATIENT MESSAGE (OUTPATIENT)
Dept: GASTROENTEROLOGY | Facility: CLINIC | Age: 38
End: 2023-04-14
Payer: COMMERCIAL

## 2023-04-17 ENCOUNTER — PATIENT MESSAGE (OUTPATIENT)
Dept: RHEUMATOLOGY | Facility: CLINIC | Age: 38
End: 2023-04-17
Payer: COMMERCIAL

## 2023-05-05 ENCOUNTER — PATIENT MESSAGE (OUTPATIENT)
Dept: RHEUMATOLOGY | Facility: CLINIC | Age: 38
End: 2023-05-05
Payer: COMMERCIAL

## 2023-05-05 DIAGNOSIS — M79.7 FIBROMYALGIA: ICD-10-CM

## 2023-05-23 ENCOUNTER — LAB VISIT (OUTPATIENT)
Dept: LAB | Facility: HOSPITAL | Age: 38
End: 2023-05-23
Attending: PHYSICIAN ASSISTANT
Payer: COMMERCIAL

## 2023-05-23 DIAGNOSIS — M06.4 UNDIFFERENTIATED INFLAMMATORY ARTHRITIS: ICD-10-CM

## 2023-05-23 DIAGNOSIS — Z51.81 MEDICATION MONITORING ENCOUNTER: ICD-10-CM

## 2023-05-23 LAB
ALBUMIN SERPL BCP-MCNC: 3.8 G/DL (ref 3.5–5.2)
ALP SERPL-CCNC: 84 U/L (ref 55–135)
ALT SERPL W/O P-5'-P-CCNC: 11 U/L (ref 10–44)
ANION GAP SERPL CALC-SCNC: 7 MMOL/L (ref 8–16)
AST SERPL-CCNC: 15 U/L (ref 10–40)
BASOPHILS # BLD AUTO: 0.01 K/UL (ref 0–0.2)
BASOPHILS NFR BLD: 0.1 % (ref 0–1.9)
BILIRUB SERPL-MCNC: 0.2 MG/DL (ref 0.1–1)
BUN SERPL-MCNC: 8 MG/DL (ref 6–20)
CALCIUM SERPL-MCNC: 8.8 MG/DL (ref 8.7–10.5)
CHLORIDE SERPL-SCNC: 103 MMOL/L (ref 95–110)
CO2 SERPL-SCNC: 29 MMOL/L (ref 23–29)
CREAT SERPL-MCNC: 1.1 MG/DL (ref 0.5–1.4)
CRP SERPL-MCNC: 29.2 MG/L (ref 0–8.2)
DIFFERENTIAL METHOD: ABNORMAL
EOSINOPHIL # BLD AUTO: 0.1 K/UL (ref 0–0.5)
EOSINOPHIL NFR BLD: 1.7 % (ref 0–8)
ERYTHROCYTE [DISTWIDTH] IN BLOOD BY AUTOMATED COUNT: 13.7 % (ref 11.5–14.5)
ERYTHROCYTE [SEDIMENTATION RATE] IN BLOOD BY PHOTOMETRIC METHOD: 20 MM/HR (ref 0–36)
EST. GFR  (NO RACE VARIABLE): >60 ML/MIN/1.73 M^2
GLUCOSE SERPL-MCNC: 88 MG/DL (ref 70–110)
HCT VFR BLD AUTO: 39.6 % (ref 37–48.5)
HGB BLD-MCNC: 12.8 G/DL (ref 12–16)
IMM GRANULOCYTES # BLD AUTO: 0.03 K/UL (ref 0–0.04)
IMM GRANULOCYTES NFR BLD AUTO: 0.4 % (ref 0–0.5)
LYMPHOCYTES # BLD AUTO: 2.1 K/UL (ref 1–4.8)
LYMPHOCYTES NFR BLD: 24.8 % (ref 18–48)
MCH RBC QN AUTO: 27.2 PG (ref 27–31)
MCHC RBC AUTO-ENTMCNC: 32.3 G/DL (ref 32–36)
MCV RBC AUTO: 84 FL (ref 82–98)
MONOCYTES # BLD AUTO: 0.6 K/UL (ref 0.3–1)
MONOCYTES NFR BLD: 6.9 % (ref 4–15)
NEUTROPHILS # BLD AUTO: 5.5 K/UL (ref 1.8–7.7)
NEUTROPHILS NFR BLD: 66.1 % (ref 38–73)
NRBC BLD-RTO: 0 /100 WBC
PLATELET # BLD AUTO: 378 K/UL (ref 150–450)
PMV BLD AUTO: 8.5 FL (ref 9.2–12.9)
POTASSIUM SERPL-SCNC: 3.9 MMOL/L (ref 3.5–5.1)
PROT SERPL-MCNC: 8 G/DL (ref 6–8.4)
RBC # BLD AUTO: 4.71 M/UL (ref 4–5.4)
SODIUM SERPL-SCNC: 139 MMOL/L (ref 136–145)
WBC # BLD AUTO: 8.32 K/UL (ref 3.9–12.7)

## 2023-05-23 PROCEDURE — 85652 RBC SED RATE AUTOMATED: CPT | Performed by: PHYSICIAN ASSISTANT

## 2023-05-23 PROCEDURE — 80053 COMPREHEN METABOLIC PANEL: CPT | Performed by: PHYSICIAN ASSISTANT

## 2023-05-23 PROCEDURE — 85025 COMPLETE CBC W/AUTO DIFF WBC: CPT | Performed by: PHYSICIAN ASSISTANT

## 2023-05-23 PROCEDURE — 86140 C-REACTIVE PROTEIN: CPT | Performed by: PHYSICIAN ASSISTANT

## 2023-05-26 ENCOUNTER — PATIENT MESSAGE (OUTPATIENT)
Dept: RHEUMATOLOGY | Facility: CLINIC | Age: 38
End: 2023-05-26

## 2023-05-26 ENCOUNTER — OFFICE VISIT (OUTPATIENT)
Dept: RHEUMATOLOGY | Facility: CLINIC | Age: 38
End: 2023-05-26
Payer: COMMERCIAL

## 2023-05-26 DIAGNOSIS — M79.7 FIBROMYALGIA: ICD-10-CM

## 2023-05-26 DIAGNOSIS — Z79.899 HIGH RISK MEDICATION USE: ICD-10-CM

## 2023-05-26 DIAGNOSIS — D84.9 IMMUNOCOMPROMISED: ICD-10-CM

## 2023-05-26 DIAGNOSIS — Z51.81 MEDICATION MONITORING ENCOUNTER: ICD-10-CM

## 2023-05-26 DIAGNOSIS — L40.59 POLYARTICULAR PSORIATIC ARTHRITIS: Primary | ICD-10-CM

## 2023-05-26 PROCEDURE — 99214 OFFICE O/P EST MOD 30 MIN: CPT | Mod: 95,,, | Performed by: PHYSICIAN ASSISTANT

## 2023-05-26 PROCEDURE — 99214 PR OFFICE/OUTPT VISIT, EST, LEVL IV, 30-39 MIN: ICD-10-PCS | Mod: 95,,, | Performed by: PHYSICIAN ASSISTANT

## 2023-05-26 PROCEDURE — 1160F RVW MEDS BY RX/DR IN RCRD: CPT | Mod: CPTII,95,, | Performed by: PHYSICIAN ASSISTANT

## 2023-05-26 PROCEDURE — 1159F MED LIST DOCD IN RCRD: CPT | Mod: CPTII,95,, | Performed by: PHYSICIAN ASSISTANT

## 2023-05-26 PROCEDURE — 1160F PR REVIEW ALL MEDS BY PRESCRIBER/CLIN PHARMACIST DOCUMENTED: ICD-10-PCS | Mod: CPTII,95,, | Performed by: PHYSICIAN ASSISTANT

## 2023-05-26 PROCEDURE — 1159F PR MEDICATION LIST DOCUMENTED IN MEDICAL RECORD: ICD-10-PCS | Mod: CPTII,95,, | Performed by: PHYSICIAN ASSISTANT

## 2023-05-26 NOTE — PROGRESS NOTES
Subjective:      Patient ID: Bee Condon is a 37 y.o. female.    Chief Complaint: No chief complaint on file.      HPI   Bee Condon  is a 37 y.o. female here for rheumatologic follow-up on PsA and FM.  Currently managed on low-dose naltrexone and Rinvoq.  Pt denies active PsO and dactylitis.  A.m. stiffness lasting about 30 minutes.  Hands are a bit stiff but overall happy with her progress.      On low-dose naltrexone for fibromyalgia.  Initially did not think it was doing much to help her symptoms until she ran out of it.  Once she had been back on it for about a week symptoms had improved again.  Overall she is stable.    Recently had to deal with serotonin syndrome.  She is under the care of a psychiatrist.    Patient denies fevers, chills, photosensitivity, eye pain, shortness of breath, chest pain, hematuria, blood in the stool, rash, sicca symptoms, raynauds, finger ulcerations.  Rheumatologic systems otherwise negative.    Serologies/Labs:  Low titer SHANNON (speckled) - repeat was neg  Neg RF, CCP, HLA B27  Current Treatment:  Rinvoq  LDN  Previous Treatment:   MTX - intolerance  Cymbalta      Current Outpatient Medications:     cetirizine (ZYRTEC) 10 MG tablet, Take 10 mg by mouth., Disp: , Rfl:     d-mannose 500 mg Cap, Take by mouth., Disp: , Rfl:     folic acid (FOLVITE) 1 MG tablet, TAKE 1 TABLET(1 MG) BY MOUTH EVERY DAY, Disp: 90 tablet, Rfl: 3    iron bis-gly/FA/C/B12/Ca/succ (IRON-150 ORAL), Take by mouth., Disp: , Rfl:     levocetirizine (XYZAL) 5 MG tablet, Take 5 mg by mouth every evening., Disp: , Rfl:     lisdexamfetamine (VYVANSE) 30 MG capsule, , Disp: , Rfl:     natrexone tablet 4.5 mg, Take 1 tablet (4.5 mg total) by mouth every evening., Disp: 90 tablet, Rfl: 3    ondansetron (ZOFRAN-ODT) 4 MG TbDL, Take 4 mg by mouth daily as needed., Disp: , Rfl:     pantoprazole (PROTONIX) 40 MG tablet, Take 1 tablet (40 mg total) by mouth once daily., Disp: 30 tablet, Rfl: 2    rizatriptan (MAXALT)  10 MG tablet, Take by mouth., Disp: , Rfl:     upadacitinib (RINVOQ) 15 mg 24 hr tablet, Take 1 tablet (15 mg total) by mouth once daily. (Patient not taking: Reported on 3/30/2023), Disp: 30 tablet, Rfl: 11    VIIBRYD 10 mg Tab tablet, Take 10 mg by mouth once daily., Disp: , Rfl:     Past Medical History:   Diagnosis Date    Anxiety     Depression     Interstitial cystitis 2019     Family History   Problem Relation Age of Onset    Cancer Mother     Heart disease Father     Diabetes Mellitus Father     Cancer Paternal Aunt     Hyperlipidemia Paternal Aunt      Social History     Socioeconomic History    Marital status:    Tobacco Use    Smoking status: Never    Smokeless tobacco: Never   Substance and Sexual Activity    Alcohol use: Not Currently    Drug use: Never    Sexual activity: Yes     Review of patient's allergies indicates:   Allergen Reactions    Iodine     Oxycodone-acetaminophen Hives and Itching    Serotonin 5ht-3 antagonists     Shellfish containing products        Objective:   There were no vitals taken for this visit.  Immunization History   Administered Date(s) Administered    COVID-19, MRNA, LN-S, PF (MODERNA FULL 0.5 ML DOSE) 02/27/2021, 03/25/2021, 11/08/2021    Influenza - Quadrivalent - PF *Preferred* (6 months and older) 12/14/2015, 10/15/2018, 09/19/2019, 09/17/2020, 11/08/2021, 09/16/2022       Physical Exam   Constitutional: She is oriented to person, place, and time. No distress.   HENT:   Head: Normocephalic and atraumatic.   Pulmonary/Chest: Effort normal.   Musculoskeletal:      Cervical back: Normal range of motion.   Neurological: She is alert and oriented to person, place, and time.   Psychiatric: Mood normal.      100% fist b/l      Recent Results (from the past 672 hour(s))   CBC Auto Differential    Collection Time: 05/23/23  9:57 AM   Result Value Ref Range    WBC 8.32 3.90 - 12.70 K/uL    RBC 4.71 4.00 - 5.40 M/uL    Hemoglobin 12.8 12.0 - 16.0 g/dL    Hematocrit 39.6  37.0 - 48.5 %    MCV 84 82 - 98 fL    MCH 27.2 27.0 - 31.0 pg    MCHC 32.3 32.0 - 36.0 g/dL    RDW 13.7 11.5 - 14.5 %    Platelets 378 150 - 450 K/uL    MPV 8.5 (L) 9.2 - 12.9 fL    Immature Granulocytes 0.4 0.0 - 0.5 %    Gran # (ANC) 5.5 1.8 - 7.7 K/uL    Immature Grans (Abs) 0.03 0.00 - 0.04 K/uL    Lymph # 2.1 1.0 - 4.8 K/uL    Mono # 0.6 0.3 - 1.0 K/uL    Eos # 0.1 0.0 - 0.5 K/uL    Baso # 0.01 0.00 - 0.20 K/uL    nRBC 0 0 /100 WBC    Gran % 66.1 38.0 - 73.0 %    Lymph % 24.8 18.0 - 48.0 %    Mono % 6.9 4.0 - 15.0 %    Eosinophil % 1.7 0.0 - 8.0 %    Basophil % 0.1 0.0 - 1.9 %    Differential Method Automated    C-Reactive Protein    Collection Time: 05/23/23  9:57 AM   Result Value Ref Range    CRP 29.2 (H) 0.0 - 8.2 mg/L   Sedimentation rate    Collection Time: 05/23/23  9:57 AM   Result Value Ref Range    Sed Rate 20 0 - 36 mm/Hr   Comprehensive Metabolic Panel    Collection Time: 05/23/23  9:57 AM   Result Value Ref Range    Sodium 139 136 - 145 mmol/L    Potassium 3.9 3.5 - 5.1 mmol/L    Chloride 103 95 - 110 mmol/L    CO2 29 23 - 29 mmol/L    Glucose 88 70 - 110 mg/dL    BUN 8 6 - 20 mg/dL    Creatinine 1.1 0.5 - 1.4 mg/dL    Calcium 8.8 8.7 - 10.5 mg/dL    Total Protein 8.0 6.0 - 8.4 g/dL    Albumin 3.8 3.5 - 5.2 g/dL    Total Bilirubin 0.2 0.1 - 1.0 mg/dL    Alkaline Phosphatase 84 55 - 135 U/L    AST 15 10 - 40 U/L    ALT 11 10 - 44 U/L    Anion Gap 7 (L) 8 - 16 mmol/L    eGFR >60 >60 mL/min/1.73 m^2         Lab Results   Component Value Date    TBGOLDPLUS Negative 05/24/2022      Lab Results   Component Value Date    HEPBCAB Negative 05/24/2022        Imaging  I have personally reviewed images and reports as below.  I agree with the interpretation.  X-Ray Foot Complete Bilateral  Order: [id number]  Status: Final result     Visible to patient: Yes (seen)     Next appt: 06/14/2023 at 09:30 AM in Gastroenterology (Nohelia Grissom PA-C)     Dx: Heel pain, bilateral     0 Result Notes  Details    Reading  Physician Reading Date Result Priority   Yogi Stevenson MD  [phone] 10/26/2022 Routine     Narrative & Impression  EXAMINATION:  XR FOOT COMPLETE 3 VIEW BILATERAL     CLINICAL HISTORY:  Pain in right foot     TECHNIQUE:  AP, lateral, and oblique views of both feet were performed.     COMPARISON:  None     FINDINGS:  No acute osseous abnormality.  Bilateral mild hallux valgus deformity with 1st metatarsal median eminence prominence.  Minimal early bilateral calcaneal enthesophyte changes.  Soft tissues unremarkable.     Impression:     As above        Electronically signed by: Yogi Stevenson MD  Date:                                            10/26/2022  Time:                                           10:35       Assessment:     1. Polyarticular psoriatic arthritis    2. Immunocompromised    3. Medication monitoring encounter    4. High risk medication use    5. Fibromyalgia            Plan:     Diagnoses and all orders for this visit:    Polyarticular psoriatic arthritis    Immunocompromised    Medication monitoring encounter    High risk medication use    Fibromyalgia        PsA  Better on Rinvoq  Labs reviewed  CBC/CMP stable  ESR WNL  CRP elevated  C/w rinvoq  Unclear why CRP elevated from prior today - will monitor  If joints or skin worsens, will discuss therapeutic change  Update TB and hep serologies w next labs  FM  C/w LDN  Tolerating it well  Drug therapy requiring intensive monitoring for toxicity  High Risk Medication Monitoring encounter  No current medication related issues, no evidence of toxicity  I ordered labs for toxicity monitoring, have personally reviewed the findings, and discussed them with the patient.  Pending labs will be sent via the portal  Compromised immune system secondary to autoimmune disease and/or use of immunosuppressive drugs.  Monitor carefully for infections.  Advised patient to get immediate medical care if any infection arises.  Also advised strict adherence  age-appropriate vaccinations and cancer screenings with PCP.  Patient advised to hold DMARD and/or biologic therapy for signs of infection or for surgery. If you are unsure what to do please call our office for instruction.[facility] [phone]  Return to clinic: 4 mos, reg 4 mprior    Follow up in about 4 months (around 9/26/2023).    The patient understands, chooses and consents to this plan and accepts all the risks which include but are not limited to the risks mentioned above.     Disclaimer: This note was prepared using a voice recognition system and is likely to have sound alike errors within the text.

## 2023-06-08 ENCOUNTER — PATIENT MESSAGE (OUTPATIENT)
Dept: RHEUMATOLOGY | Facility: CLINIC | Age: 38
End: 2023-06-08

## 2023-06-08 ENCOUNTER — OFFICE VISIT (OUTPATIENT)
Dept: RHEUMATOLOGY | Facility: CLINIC | Age: 38
End: 2023-06-08
Payer: COMMERCIAL

## 2023-06-08 DIAGNOSIS — L40.59 POLYARTICULAR PSORIATIC ARTHRITIS: Primary | ICD-10-CM

## 2023-06-08 DIAGNOSIS — Z79.899 HIGH RISK MEDICATION USE: ICD-10-CM

## 2023-06-08 DIAGNOSIS — D84.9 IMMUNOCOMPROMISED: ICD-10-CM

## 2023-06-08 DIAGNOSIS — Z51.81 MEDICATION MONITORING ENCOUNTER: ICD-10-CM

## 2023-06-08 DIAGNOSIS — M54.50 CHRONIC BILATERAL LOW BACK PAIN, UNSPECIFIED WHETHER SCIATICA PRESENT: ICD-10-CM

## 2023-06-08 DIAGNOSIS — M79.7 FIBROMYALGIA: ICD-10-CM

## 2023-06-08 DIAGNOSIS — G89.29 CHRONIC BILATERAL LOW BACK PAIN, UNSPECIFIED WHETHER SCIATICA PRESENT: ICD-10-CM

## 2023-06-08 PROCEDURE — 99215 OFFICE O/P EST HI 40 MIN: CPT | Mod: 95,,, | Performed by: PHYSICIAN ASSISTANT

## 2023-06-08 PROCEDURE — 1160F RVW MEDS BY RX/DR IN RCRD: CPT | Mod: CPTII,95,, | Performed by: PHYSICIAN ASSISTANT

## 2023-06-08 PROCEDURE — 1160F PR REVIEW ALL MEDS BY PRESCRIBER/CLIN PHARMACIST DOCUMENTED: ICD-10-PCS | Mod: CPTII,95,, | Performed by: PHYSICIAN ASSISTANT

## 2023-06-08 PROCEDURE — 1159F MED LIST DOCD IN RCRD: CPT | Mod: CPTII,95,, | Performed by: PHYSICIAN ASSISTANT

## 2023-06-08 PROCEDURE — 1159F PR MEDICATION LIST DOCUMENTED IN MEDICAL RECORD: ICD-10-PCS | Mod: CPTII,95,, | Performed by: PHYSICIAN ASSISTANT

## 2023-06-08 PROCEDURE — 99215 PR OFFICE/OUTPT VISIT, EST, LEVL V, 40-54 MIN: ICD-10-PCS | Mod: 95,,, | Performed by: PHYSICIAN ASSISTANT

## 2023-06-08 RX ORDER — METHYLPREDNISOLONE 4 MG/1
TABLET ORAL
Qty: 1 EACH | Refills: 0 | Status: SHIPPED | OUTPATIENT
Start: 2023-06-08 | End: 2023-11-16

## 2023-06-08 RX ORDER — ADALIMUMAB 40MG/0.4ML
40 KIT SUBCUTANEOUS
Qty: 2 PEN | Refills: 4 | Status: ACTIVE | OUTPATIENT
Start: 2023-06-08 | End: 2023-08-16

## 2023-06-08 NOTE — PROGRESS NOTES
Subjective:      Patient ID: Bee Condon is a 37 y.o. female.    Chief Complaint: No chief complaint on file.      HPI   Bee Condon  is a 37 y.o. female here for rheumatologic follow-up on PsA and FM.  Currently managed on low-dose naltrexone and Rinvoq.  Pt denies active PsO and dactylitis.  A.m. stiffness lasting all day.  Hands are swollen and she has difficulty even making a fist.  She is also complaining of burning pain on the backs of her hands, the tops of her feet, and the lower back.  This has been getting worse in the last week.  This is a new symptom for her.  No associated weakness.  No Raynaud's.    On low-dose naltrexone for fibromyalgia.  Initially did not think it was doing much to help her symptoms until she ran out of it.  Once she had been back on it for about a week symptoms had improved again.  Overall she is stable.    Recently had to deal with serotonin syndrome.  She is under the care of a psychiatrist.  They lowered 1 of her medications and she is back to baseline.    Patient c/o photosensitivity but denies fevers, chills, eye pain, shortness of breath, chest pain, hematuria, blood in the stool, rash, sicca symptoms, raynauds, finger ulcerations.  Rheumatologic systems otherwise negative.    Serologies/Labs:  Low titer SHANNON (speckled) - repeat was neg  Neg RF, CCP, HLA B27  Current Treatment:  Rinvoq  LDN  Previous Treatment:   MTX - intolerance  Cymbalta      Current Outpatient Medications:     cetirizine (ZYRTEC) 10 MG tablet, Take 10 mg by mouth., Disp: , Rfl:     d-mannose 500 mg Cap, Take by mouth., Disp: , Rfl:     folic acid (FOLVITE) 1 MG tablet, TAKE 1 TABLET(1 MG) BY MOUTH EVERY DAY, Disp: 90 tablet, Rfl: 3    iron bis-gly/FA/C/B12/Ca/succ (IRON-150 ORAL), Take by mouth., Disp: , Rfl:     levocetirizine (XYZAL) 5 MG tablet, Take 5 mg by mouth every evening., Disp: , Rfl:     lisdexamfetamine (VYVANSE) 30 MG capsule, , Disp: , Rfl:     natrexone tablet 4.5 mg, Take 1 tablet  (4.5 mg total) by mouth every evening., Disp: 90 tablet, Rfl: 3    ondansetron (ZOFRAN-ODT) 4 MG TbDL, Take 4 mg by mouth daily as needed., Disp: , Rfl:     pantoprazole (PROTONIX) 40 MG tablet, Take 1 tablet (40 mg total) by mouth once daily., Disp: 30 tablet, Rfl: 2    rizatriptan (MAXALT) 10 MG tablet, Take by mouth., Disp: , Rfl:     VIIBRYD 10 mg Tab tablet, Take 10 mg by mouth once daily., Disp: , Rfl:     Past Medical History:   Diagnosis Date    Anxiety     Depression     Interstitial cystitis 2019     Family History   Problem Relation Age of Onset    Cancer Mother     Heart disease Father     Diabetes Mellitus Father     Cancer Paternal Aunt     Hyperlipidemia Paternal Aunt      Social History     Socioeconomic History    Marital status:    Tobacco Use    Smoking status: Never    Smokeless tobacco: Never   Substance and Sexual Activity    Alcohol use: Not Currently    Drug use: Never    Sexual activity: Yes     Review of patient's allergies indicates:   Allergen Reactions    Iodine     Oxycodone-acetaminophen Hives and Itching    Serotonin 5ht-3 antagonists     Shellfish containing products        Objective:   There were no vitals taken for this visit.  Immunization History   Administered Date(s) Administered    COVID-19, MRNA, LN-S, PF (MODERNA FULL 0.5 ML DOSE) 02/27/2021, 03/25/2021, 11/08/2021    Influenza - Quadrivalent - PF *Preferred* (6 months and older) 12/14/2015, 10/15/2018, 09/19/2019, 09/17/2020, 11/08/2021, 09/16/2022       Physical Exam   Constitutional: She is oriented to person, place, and time. No distress.   HENT:   Head: Normocephalic and atraumatic.   Pulmonary/Chest: Effort normal.   Musculoskeletal:      Cervical back: Normal range of motion.   Neurological: She is alert and oriented to person, place, and time.   Psychiatric: Mood normal.      100% fist b/l, diffuse swelling bilat hands      Recent Results (from the past 672 hour(s))   CBC Auto Differential    Collection Time:  05/23/23  9:57 AM   Result Value Ref Range    WBC 8.32 3.90 - 12.70 K/uL    RBC 4.71 4.00 - 5.40 M/uL    Hemoglobin 12.8 12.0 - 16.0 g/dL    Hematocrit 39.6 37.0 - 48.5 %    MCV 84 82 - 98 fL    MCH 27.2 27.0 - 31.0 pg    MCHC 32.3 32.0 - 36.0 g/dL    RDW 13.7 11.5 - 14.5 %    Platelets 378 150 - 450 K/uL    MPV 8.5 (L) 9.2 - 12.9 fL    Immature Granulocytes 0.4 0.0 - 0.5 %    Gran # (ANC) 5.5 1.8 - 7.7 K/uL    Immature Grans (Abs) 0.03 0.00 - 0.04 K/uL    Lymph # 2.1 1.0 - 4.8 K/uL    Mono # 0.6 0.3 - 1.0 K/uL    Eos # 0.1 0.0 - 0.5 K/uL    Baso # 0.01 0.00 - 0.20 K/uL    nRBC 0 0 /100 WBC    Gran % 66.1 38.0 - 73.0 %    Lymph % 24.8 18.0 - 48.0 %    Mono % 6.9 4.0 - 15.0 %    Eosinophil % 1.7 0.0 - 8.0 %    Basophil % 0.1 0.0 - 1.9 %    Differential Method Automated    C-Reactive Protein    Collection Time: 05/23/23  9:57 AM   Result Value Ref Range    CRP 29.2 (H) 0.0 - 8.2 mg/L   Sedimentation rate    Collection Time: 05/23/23  9:57 AM   Result Value Ref Range    Sed Rate 20 0 - 36 mm/Hr   Comprehensive Metabolic Panel    Collection Time: 05/23/23  9:57 AM   Result Value Ref Range    Sodium 139 136 - 145 mmol/L    Potassium 3.9 3.5 - 5.1 mmol/L    Chloride 103 95 - 110 mmol/L    CO2 29 23 - 29 mmol/L    Glucose 88 70 - 110 mg/dL    BUN 8 6 - 20 mg/dL    Creatinine 1.1 0.5 - 1.4 mg/dL    Calcium 8.8 8.7 - 10.5 mg/dL    Total Protein 8.0 6.0 - 8.4 g/dL    Albumin 3.8 3.5 - 5.2 g/dL    Total Bilirubin 0.2 0.1 - 1.0 mg/dL    Alkaline Phosphatase 84 55 - 135 U/L    AST 15 10 - 40 U/L    ALT 11 10 - 44 U/L    Anion Gap 7 (L) 8 - 16 mmol/L    eGFR >60 >60 mL/min/1.73 m^2         Lab Results   Component Value Date    TBGOLDPLUS Negative 05/24/2022      Lab Results   Component Value Date    HEPBCAB Negative 05/24/2022        Imaging  I have personally reviewed images and reports as below.  I agree with the interpretation.  X-Ray Foot Complete Bilateral  Order: [id number]  Status: Final result     Visible to patient: Yes  (seen)     Next appt: 06/14/2023 at 09:30 AM in Gastroenterology (Nohelia Grissom PA-C)     Dx: Heel pain, bilateral     0 Result Notes  Details    Reading Physician Reading Date Result Priority   Yogi Stevenson MD  [phone] 10/26/2022 Routine     Narrative & Impression  EXAMINATION:  XR FOOT COMPLETE 3 VIEW BILATERAL     CLINICAL HISTORY:  Pain in right foot     TECHNIQUE:  AP, lateral, and oblique views of both feet were performed.     COMPARISON:  None     FINDINGS:  No acute osseous abnormality.  Bilateral mild hallux valgus deformity with 1st metatarsal median eminence prominence.  Minimal early bilateral calcaneal enthesophyte changes.  Soft tissues unremarkable.     Impression:     As above        Electronically signed by: Yogi Stevenson MD  Date:                                            10/26/2022  Time:                                           10:35       Assessment:     1. Polyarticular psoriatic arthritis    2. Immunocompromised    3. Medication monitoring encounter    4. High risk medication use    5. Fibromyalgia    6. Chronic bilateral low back pain, unspecified whether sciatica present              Plan:     Diagnoses and all orders for this visit:    Polyarticular psoriatic arthritis    Immunocompromised    Medication monitoring encounter    High risk medication use    Fibromyalgia    Chronic bilateral low back pain, unspecified whether sciatica present          PsA/PsO - woresning  Worsening joint pain/swelling and stiffness  Labs reviewed  CBC/CMP stable  ESR WNL  CRP has increased - now 29  DC rinvoq  Humira 40 mg Sq every 2 weeks  Discussed risks and benefits Humira  Literature provided through the patient portal on today's after visit summary.  Patient instructed on how to access it  Update TB and hep serologies next week  Consider EMG if burning pain persists  FM  C/w LDN  Tolerating it well  Drug therapy requiring intensive monitoring for toxicity  High Risk Medication  Monitoring encounter  No current medication related issues, no evidence of toxicity  I ordered labs for toxicity monitoring, have personally reviewed the findings, and discussed them with the patient.  Pending labs will be sent via the portal  Compromised immune system secondary to autoimmune disease and/or use of immunosuppressive drugs.  Monitor carefully for infections.  Advised patient to get immediate medical care if any infection arises.  Also advised strict adherence age-appropriate vaccinations and cancer screenings with PCP.  Patient advised to hold DMARD and/or biologic therapy for signs of infection or for surgery. If you are unsure what to do please call our office for instruction.[facility] [phone]  Return to clinic: Labs only 6 weeks; f/u as scheduled 3 mos labs prior    No follow-ups on file.    The patient understands, chooses and consents to this plan and accepts all the risks which include but are not limited to the risks mentioned above.     Disclaimer: This note was prepared using a voice recognition system and is likely to have sound alike errors within the text.

## 2023-06-12 ENCOUNTER — LAB VISIT (OUTPATIENT)
Dept: LAB | Facility: HOSPITAL | Age: 38
End: 2023-06-12
Attending: INTERNAL MEDICINE
Payer: COMMERCIAL

## 2023-06-12 ENCOUNTER — PATIENT MESSAGE (OUTPATIENT)
Dept: RHEUMATOLOGY | Facility: CLINIC | Age: 38
End: 2023-06-12
Payer: COMMERCIAL

## 2023-06-12 DIAGNOSIS — D84.9 IMMUNOCOMPROMISED: ICD-10-CM

## 2023-06-12 DIAGNOSIS — L40.59 POLYARTICULAR PSORIATIC ARTHRITIS: ICD-10-CM

## 2023-06-12 DIAGNOSIS — Z51.81 MEDICATION MONITORING ENCOUNTER: ICD-10-CM

## 2023-06-12 DIAGNOSIS — R10.9 ABDOMINAL PAIN, UNSPECIFIED ABDOMINAL LOCATION: ICD-10-CM

## 2023-06-12 DIAGNOSIS — R19.7 DIARRHEA, UNSPECIFIED TYPE: ICD-10-CM

## 2023-06-12 LAB
ALBUMIN SERPL BCP-MCNC: 3.6 G/DL (ref 3.5–5.2)
ALP SERPL-CCNC: 65 U/L (ref 55–135)
ALT SERPL W/O P-5'-P-CCNC: 15 U/L (ref 10–44)
ANION GAP SERPL CALC-SCNC: 9 MMOL/L (ref 8–16)
AST SERPL-CCNC: 17 U/L (ref 10–40)
BASOPHILS # BLD AUTO: 0.03 K/UL (ref 0–0.2)
BASOPHILS NFR BLD: 0.2 % (ref 0–1.9)
BILIRUB SERPL-MCNC: 0.2 MG/DL (ref 0.1–1)
BUN SERPL-MCNC: 15 MG/DL (ref 6–20)
CALCIUM SERPL-MCNC: 8.8 MG/DL (ref 8.7–10.5)
CHLORIDE SERPL-SCNC: 102 MMOL/L (ref 95–110)
CO2 SERPL-SCNC: 26 MMOL/L (ref 23–29)
CREAT SERPL-MCNC: 0.9 MG/DL (ref 0.5–1.4)
CRP SERPL-MCNC: 14.9 MG/L (ref 0–8.2)
DIFFERENTIAL METHOD: ABNORMAL
EOSINOPHIL # BLD AUTO: 0.1 K/UL (ref 0–0.5)
EOSINOPHIL NFR BLD: 0.4 % (ref 0–8)
ERYTHROCYTE [DISTWIDTH] IN BLOOD BY AUTOMATED COUNT: 13.9 % (ref 11.5–14.5)
ERYTHROCYTE [SEDIMENTATION RATE] IN BLOOD BY PHOTOMETRIC METHOD: 36 MM/HR (ref 0–36)
EST. GFR  (NO RACE VARIABLE): >60 ML/MIN/1.73 M^2
GLUCOSE SERPL-MCNC: 84 MG/DL (ref 70–110)
HAV IGM SERPL QL IA: NORMAL
HBV CORE IGM SERPL QL IA: NORMAL
HBV SURFACE AG SERPL QL IA: NORMAL
HCT VFR BLD AUTO: 37.4 % (ref 37–48.5)
HCV AB SERPL QL IA: NORMAL
HGB BLD-MCNC: 12.4 G/DL (ref 12–16)
IMM GRANULOCYTES # BLD AUTO: 0.04 K/UL (ref 0–0.04)
IMM GRANULOCYTES NFR BLD AUTO: 0.3 % (ref 0–0.5)
LYMPHOCYTES # BLD AUTO: 2.7 K/UL (ref 1–4.8)
LYMPHOCYTES NFR BLD: 22.3 % (ref 18–48)
MCH RBC QN AUTO: 27.7 PG (ref 27–31)
MCHC RBC AUTO-ENTMCNC: 33.2 G/DL (ref 32–36)
MCV RBC AUTO: 84 FL (ref 82–98)
MONOCYTES # BLD AUTO: 1.1 K/UL (ref 0.3–1)
MONOCYTES NFR BLD: 8.8 % (ref 4–15)
NEUTROPHILS # BLD AUTO: 8.2 K/UL (ref 1.8–7.7)
NEUTROPHILS NFR BLD: 68 % (ref 38–73)
NRBC BLD-RTO: 0 /100 WBC
PLATELET # BLD AUTO: 371 K/UL (ref 150–450)
PMV BLD AUTO: 8.4 FL (ref 9.2–12.9)
POTASSIUM SERPL-SCNC: 4.1 MMOL/L (ref 3.5–5.1)
PROT SERPL-MCNC: 7.6 G/DL (ref 6–8.4)
RBC # BLD AUTO: 4.47 M/UL (ref 4–5.4)
SODIUM SERPL-SCNC: 137 MMOL/L (ref 136–145)
WBC # BLD AUTO: 12.04 K/UL (ref 3.9–12.7)

## 2023-06-12 PROCEDURE — 80053 COMPREHEN METABOLIC PANEL: CPT | Performed by: PHYSICIAN ASSISTANT

## 2023-06-12 PROCEDURE — 85652 RBC SED RATE AUTOMATED: CPT | Performed by: PHYSICIAN ASSISTANT

## 2023-06-12 PROCEDURE — 86140 C-REACTIVE PROTEIN: CPT | Performed by: PHYSICIAN ASSISTANT

## 2023-06-12 PROCEDURE — 85025 COMPLETE CBC W/AUTO DIFF WBC: CPT | Performed by: PHYSICIAN ASSISTANT

## 2023-06-12 PROCEDURE — 80074 ACUTE HEPATITIS PANEL: CPT | Performed by: PHYSICIAN ASSISTANT

## 2023-06-12 PROCEDURE — 36415 COLL VENOUS BLD VENIPUNCTURE: CPT | Performed by: PHYSICIAN ASSISTANT

## 2023-06-13 ENCOUNTER — PATIENT MESSAGE (OUTPATIENT)
Dept: RHEUMATOLOGY | Facility: CLINIC | Age: 38
End: 2023-06-13
Payer: COMMERCIAL

## 2023-06-14 ENCOUNTER — OFFICE VISIT (OUTPATIENT)
Dept: GASTROENTEROLOGY | Facility: CLINIC | Age: 38
End: 2023-06-14
Payer: COMMERCIAL

## 2023-06-14 ENCOUNTER — LAB VISIT (OUTPATIENT)
Dept: LAB | Facility: HOSPITAL | Age: 38
End: 2023-06-14
Attending: PHYSICIAN ASSISTANT
Payer: COMMERCIAL

## 2023-06-14 VITALS — BODY MASS INDEX: 41.68 KG/M2 | HEIGHT: 68 IN | WEIGHT: 275 LBS

## 2023-06-14 DIAGNOSIS — Z87.898 HISTORY OF DIARRHEA: ICD-10-CM

## 2023-06-14 DIAGNOSIS — D84.9 IMMUNOCOMPROMISED: ICD-10-CM

## 2023-06-14 DIAGNOSIS — L40.59 POLYARTICULAR PSORIATIC ARTHRITIS: ICD-10-CM

## 2023-06-14 DIAGNOSIS — Z87.898 HISTORY OF ABDOMINAL PAIN: ICD-10-CM

## 2023-06-14 DIAGNOSIS — Z87.19 HISTORY OF ESOPHAGEAL REFLUX: Primary | ICD-10-CM

## 2023-06-14 DIAGNOSIS — Z51.81 MEDICATION MONITORING ENCOUNTER: ICD-10-CM

## 2023-06-14 PROCEDURE — 99213 PR OFFICE/OUTPT VISIT, EST, LEVL III, 20-29 MIN: ICD-10-PCS | Mod: 95,,, | Performed by: PHYSICIAN ASSISTANT

## 2023-06-14 PROCEDURE — 99213 OFFICE O/P EST LOW 20 MIN: CPT | Mod: 95,,, | Performed by: PHYSICIAN ASSISTANT

## 2023-06-14 PROCEDURE — 3008F PR BODY MASS INDEX (BMI) DOCUMENTED: ICD-10-PCS | Mod: CPTII,95,, | Performed by: PHYSICIAN ASSISTANT

## 2023-06-14 PROCEDURE — 86480 TB TEST CELL IMMUN MEASURE: CPT | Performed by: PHYSICIAN ASSISTANT

## 2023-06-14 PROCEDURE — 1159F PR MEDICATION LIST DOCUMENTED IN MEDICAL RECORD: ICD-10-PCS | Mod: CPTII,95,, | Performed by: PHYSICIAN ASSISTANT

## 2023-06-14 PROCEDURE — 1159F MED LIST DOCD IN RCRD: CPT | Mod: CPTII,95,, | Performed by: PHYSICIAN ASSISTANT

## 2023-06-14 PROCEDURE — 3008F BODY MASS INDEX DOCD: CPT | Mod: CPTII,95,, | Performed by: PHYSICIAN ASSISTANT

## 2023-06-14 NOTE — PROGRESS NOTES
Subjective:      Patient ID: Bee Condon is a 37 y.o. female.    Chief Complaint: Abdominal Pain (Follow up )  The patient location is: [place]  The chief complaint leading to consultation is: follow up    Visit type: audiovisual    Face to Face time with patient: 10 mins  15 minutes of total time spent on the encounter, which includes face to face time and non-face to face time preparing to see the patient (eg, review of tests), Obtaining and/or reviewing separately obtained history, Documenting clinical information in the electronic or other health record, Independently interpreting results (not separately reported) and communicating results to the patient/family/caregiver, or Care coordination (not separately reported).     Each patient to whom he or she provides medical services by telemedicine is:  (1) informed of the relationship between the physician and patient and the respective role of any other health care provider with respect to management of the patient; and (2) notified that he or she may decline to receive medical services by telemedicine and may withdraw from such care at any time.    Notes:     HPI:  Patient reports today via telemedicine for follow up of the above. Last seen in March 2023 for upper abdominal discomfort and diarrhea. Was started on Protonix. She reports today that all of her GI symptoms have resolved. Still taking PPI therapy. She is following with rheumatology for psoriatic arthritis - has been on Rinvoq, however, they believe this medication has failed. Starting back with chronic joint pains and overall feeling unwell. She is about to switch to Humira. No additional complaints.    Review of Systems   Constitutional:  Positive for fatigue. Negative for activity change, appetite change, chills, diaphoresis and fever.   HENT:  Negative for trouble swallowing.    Respiratory:  Negative for shortness of breath.    Cardiovascular:  Negative for chest pain.   Gastrointestinal:  Negative  for abdominal distention, abdominal pain, anal bleeding, blood in stool, constipation, diarrhea, nausea and vomiting.   Musculoskeletal:  Positive for arthralgias (chronic).   Neurological:  Negative for dizziness and weakness.   Psychiatric/Behavioral:  The patient is nervous/anxious.      Medical History: Reviewed    Social History: Reviewed    Allergies: Reviewed    Objective:     Physical Exam  Constitutional:       General: She is not in acute distress.     Appearance: Normal appearance. She is not ill-appearing, toxic-appearing or diaphoretic.   HENT:      Head: Normocephalic and atraumatic.   Neurological:      Mental Status: She is alert.       Assessment:     1. History of esophageal reflux    2. History of abdominal pain    3. History of diarrhea        Plan:     -All GI symptoms have resolved since starting Protonix, per patient.   -She is undergoing medication change with rheumatology from Rinvoq to Humira. I would prefer she stay with her current dose of Protonix while she is making these changes. Recommend follow up in 3 months. If GI symptoms continue to be well controlled, can decrease Protonix to 20mg daily and then hopefully discontinue. Discussed this with patient.   -GERD diet    Bee was seen today for abdominal pain.    Diagnoses and all orders for this visit:    History of esophageal reflux    History of abdominal pain    History of diarrhea        No follow-ups on file.    Thank you for the opportunity to participate in the care of this patient.   Nohelia Grissom PA-C.

## 2023-06-15 ENCOUNTER — TELEPHONE (OUTPATIENT)
Dept: PHARMACY | Facility: CLINIC | Age: 38
End: 2023-06-15
Payer: COMMERCIAL

## 2023-06-15 NOTE — TELEPHONE ENCOUNTER
Zahira, this is Vinay Veras, clinical pharmacist with [facility] that is part of your care team.  We have begun working on your prescription that your doctor has sent us. Our next steps include:     Working with your insurance company to obtain approval for your medication  Working with you to ensure your medication is affordable     We will be calling you along the way with updates on your medication but if you have any concerns or receive information that you would like to discuss please reach us at [phone].    Welcome call outcome: Patient/caregiver reached

## 2023-06-16 LAB
GAMMA INTERFERON BACKGROUND BLD IA-ACNC: 0.01 IU/ML
M TB IFN-G CD4+ BCKGRND COR BLD-ACNC: 0.01 IU/ML
MITOGEN IGNF BCKGRD COR BLD-ACNC: 1.58 IU/ML
TB GOLD PLUS: NEGATIVE
TB2 - NIL: 0.01 IU/ML

## 2023-06-19 ENCOUNTER — SPECIALTY PHARMACY (OUTPATIENT)
Dept: PHARMACY | Facility: CLINIC | Age: 38
End: 2023-06-19
Payer: COMMERCIAL

## 2023-06-19 NOTE — TELEPHONE ENCOUNTER
PA approved from 06/18/2023 to 06/18/2024 for Humira. [facility]. Patient must fill with [facility]. Patient notified. Transferring Rx for Humira to [facility] and closing out at [facility].

## 2023-06-20 DIAGNOSIS — R07.2 SUBSTERNAL PAIN: ICD-10-CM

## 2023-06-20 DIAGNOSIS — R10.9 ABDOMINAL PAIN, UNSPECIFIED ABDOMINAL LOCATION: ICD-10-CM

## 2023-06-20 DIAGNOSIS — Z87.19 HISTORY OF ESOPHAGEAL REFLUX: ICD-10-CM

## 2023-06-21 RX ORDER — PANTOPRAZOLE SODIUM 40 MG/1
TABLET, DELAYED RELEASE ORAL
Qty: 30 TABLET | Refills: 2 | Status: SHIPPED | OUTPATIENT
Start: 2023-06-21 | End: 2023-10-11

## 2023-06-25 ENCOUNTER — PATIENT MESSAGE (OUTPATIENT)
Dept: RHEUMATOLOGY | Facility: CLINIC | Age: 38
End: 2023-06-25
Payer: COMMERCIAL

## 2023-06-26 ENCOUNTER — TELEPHONE (OUTPATIENT)
Dept: RHEUMATOLOGY | Facility: CLINIC | Age: 38
End: 2023-06-26
Payer: COMMERCIAL

## 2023-06-26 NOTE — TELEPHONE ENCOUNTER
----- Message from Gemma Mitchell sent at 6/26/2023  7:23 AM CDT -----  Contact: [id number]  Calling regarding pt medication,adalimumab (HUMIRA,CF, PEN) 40 mg/0.4 mL PnKt,([id number]) needing clarification(asking if meds are replacing rinvoq . Please call back at [phone] . thanksdj

## 2023-06-26 NOTE — TELEPHONE ENCOUNTER
Contact: [id number]  Calling regarding pt medication,adalimumab (HUMIRA,CF, PEN) 40 mg/0.4 mL PnKt,([id number]) needing clarification(asking if meds are replacing rinvoq . Please call back at [phone] .

## 2023-07-09 ENCOUNTER — PATIENT MESSAGE (OUTPATIENT)
Dept: RHEUMATOLOGY | Facility: CLINIC | Age: 38
End: 2023-07-09
Payer: COMMERCIAL

## 2023-07-17 ENCOUNTER — LAB VISIT (OUTPATIENT)
Dept: LAB | Facility: HOSPITAL | Age: 38
End: 2023-07-17
Attending: INTERNAL MEDICINE
Payer: COMMERCIAL

## 2023-07-17 DIAGNOSIS — L40.59 POLYARTICULAR PSORIATIC ARTHRITIS: ICD-10-CM

## 2023-07-17 DIAGNOSIS — Z51.81 MEDICATION MONITORING ENCOUNTER: ICD-10-CM

## 2023-07-17 DIAGNOSIS — D84.9 IMMUNOCOMPROMISED: ICD-10-CM

## 2023-07-17 LAB
ALBUMIN SERPL BCP-MCNC: 3.6 G/DL (ref 3.5–5.2)
ALP SERPL-CCNC: 73 U/L (ref 55–135)
ALT SERPL W/O P-5'-P-CCNC: 16 U/L (ref 10–44)
ANION GAP SERPL CALC-SCNC: 11 MMOL/L (ref 8–16)
AST SERPL-CCNC: 16 U/L (ref 10–40)
BASOPHILS # BLD AUTO: 0.04 K/UL (ref 0–0.2)
BASOPHILS NFR BLD: 0.6 % (ref 0–1.9)
BILIRUB SERPL-MCNC: 0.3 MG/DL (ref 0.1–1)
BUN SERPL-MCNC: 9 MG/DL (ref 6–20)
CALCIUM SERPL-MCNC: 9.1 MG/DL (ref 8.7–10.5)
CHLORIDE SERPL-SCNC: 104 MMOL/L (ref 95–110)
CO2 SERPL-SCNC: 26 MMOL/L (ref 23–29)
CREAT SERPL-MCNC: 0.9 MG/DL (ref 0.5–1.4)
DIFFERENTIAL METHOD: ABNORMAL
EOSINOPHIL # BLD AUTO: 0.2 K/UL (ref 0–0.5)
EOSINOPHIL NFR BLD: 2.2 % (ref 0–8)
ERYTHROCYTE [DISTWIDTH] IN BLOOD BY AUTOMATED COUNT: 13.8 % (ref 11.5–14.5)
EST. GFR  (NO RACE VARIABLE): >60 ML/MIN/1.73 M^2
GLUCOSE SERPL-MCNC: 107 MG/DL (ref 70–110)
HCT VFR BLD AUTO: 38.5 % (ref 37–48.5)
HGB BLD-MCNC: 12.5 G/DL (ref 12–16)
IMM GRANULOCYTES # BLD AUTO: 0.02 K/UL (ref 0–0.04)
IMM GRANULOCYTES NFR BLD AUTO: 0.3 % (ref 0–0.5)
LYMPHOCYTES # BLD AUTO: 1.6 K/UL (ref 1–4.8)
LYMPHOCYTES NFR BLD: 23.8 % (ref 18–48)
MCH RBC QN AUTO: 27.2 PG (ref 27–31)
MCHC RBC AUTO-ENTMCNC: 32.5 G/DL (ref 32–36)
MCV RBC AUTO: 84 FL (ref 82–98)
MONOCYTES # BLD AUTO: 0.4 K/UL (ref 0.3–1)
MONOCYTES NFR BLD: 6.5 % (ref 4–15)
NEUTROPHILS # BLD AUTO: 4.5 K/UL (ref 1.8–7.7)
NEUTROPHILS NFR BLD: 66.6 % (ref 38–73)
NRBC BLD-RTO: 0 /100 WBC
PLATELET # BLD AUTO: 365 K/UL (ref 150–450)
PMV BLD AUTO: 8.9 FL (ref 9.2–12.9)
POTASSIUM SERPL-SCNC: 4 MMOL/L (ref 3.5–5.1)
PROT SERPL-MCNC: 7.5 G/DL (ref 6–8.4)
RBC # BLD AUTO: 4.59 M/UL (ref 4–5.4)
SODIUM SERPL-SCNC: 141 MMOL/L (ref 136–145)
WBC # BLD AUTO: 6.76 K/UL (ref 3.9–12.7)

## 2023-07-17 PROCEDURE — 85025 COMPLETE CBC W/AUTO DIFF WBC: CPT | Performed by: PHYSICIAN ASSISTANT

## 2023-07-17 PROCEDURE — 36415 COLL VENOUS BLD VENIPUNCTURE: CPT | Performed by: PHYSICIAN ASSISTANT

## 2023-07-17 PROCEDURE — 80053 COMPREHEN METABOLIC PANEL: CPT | Performed by: PHYSICIAN ASSISTANT

## 2023-07-24 ENCOUNTER — PATIENT MESSAGE (OUTPATIENT)
Dept: RHEUMATOLOGY | Facility: CLINIC | Age: 38
End: 2023-07-24
Payer: COMMERCIAL

## 2023-08-10 ENCOUNTER — PATIENT MESSAGE (OUTPATIENT)
Dept: RHEUMATOLOGY | Facility: CLINIC | Age: 38
End: 2023-08-10
Payer: COMMERCIAL

## 2023-08-11 ENCOUNTER — PATIENT MESSAGE (OUTPATIENT)
Dept: RHEUMATOLOGY | Facility: CLINIC | Age: 38
End: 2023-08-11
Payer: COMMERCIAL

## 2023-08-16 ENCOUNTER — OFFICE VISIT (OUTPATIENT)
Dept: RHEUMATOLOGY | Facility: CLINIC | Age: 38
End: 2023-08-16
Payer: COMMERCIAL

## 2023-08-16 VITALS
SYSTOLIC BLOOD PRESSURE: 138 MMHG | HEIGHT: 68 IN | HEART RATE: 76 BPM | BODY MASS INDEX: 42.46 KG/M2 | DIASTOLIC BLOOD PRESSURE: 93 MMHG | WEIGHT: 280.19 LBS

## 2023-08-16 DIAGNOSIS — M79.7 FIBROMYALGIA: ICD-10-CM

## 2023-08-16 DIAGNOSIS — T50.905A ADVERSE EFFECT OF DRUG, INITIAL ENCOUNTER: ICD-10-CM

## 2023-08-16 DIAGNOSIS — Z79.899 HIGH RISK MEDICATION USE: ICD-10-CM

## 2023-08-16 DIAGNOSIS — D84.821 IMMUNOCOMPROMISED STATE DUE TO DRUG THERAPY: ICD-10-CM

## 2023-08-16 DIAGNOSIS — L40.59 POLYARTICULAR PSORIATIC ARTHRITIS: Primary | ICD-10-CM

## 2023-08-16 DIAGNOSIS — Z79.899 IMMUNOCOMPROMISED STATE DUE TO DRUG THERAPY: ICD-10-CM

## 2023-08-16 DIAGNOSIS — Z51.81 MEDICATION MONITORING ENCOUNTER: ICD-10-CM

## 2023-08-16 PROCEDURE — 99999 PR PBB SHADOW E&M-EST. PATIENT-LVL IV: ICD-10-PCS | Mod: PBBFAC,,, | Performed by: PHYSICIAN ASSISTANT

## 2023-08-16 PROCEDURE — 99215 PR OFFICE/OUTPT VISIT, EST, LEVL V, 40-54 MIN: ICD-10-PCS | Mod: 25,S$GLB,, | Performed by: PHYSICIAN ASSISTANT

## 2023-08-16 PROCEDURE — 3080F DIAST BP >= 90 MM HG: CPT | Mod: CPTII,S$GLB,, | Performed by: PHYSICIAN ASSISTANT

## 2023-08-16 PROCEDURE — 96372 THER/PROPH/DIAG INJ SC/IM: CPT | Mod: S$GLB,,, | Performed by: PHYSICIAN ASSISTANT

## 2023-08-16 PROCEDURE — 99999 PR PBB SHADOW E&M-EST. PATIENT-LVL IV: CPT | Mod: PBBFAC,,, | Performed by: PHYSICIAN ASSISTANT

## 2023-08-16 PROCEDURE — 1159F PR MEDICATION LIST DOCUMENTED IN MEDICAL RECORD: ICD-10-PCS | Mod: CPTII,S$GLB,, | Performed by: PHYSICIAN ASSISTANT

## 2023-08-16 PROCEDURE — 3008F PR BODY MASS INDEX (BMI) DOCUMENTED: ICD-10-PCS | Mod: CPTII,S$GLB,, | Performed by: PHYSICIAN ASSISTANT

## 2023-08-16 PROCEDURE — 3008F BODY MASS INDEX DOCD: CPT | Mod: CPTII,S$GLB,, | Performed by: PHYSICIAN ASSISTANT

## 2023-08-16 PROCEDURE — 3075F PR MOST RECENT SYSTOLIC BLOOD PRESS GE 130-139MM HG: ICD-10-PCS | Mod: CPTII,S$GLB,, | Performed by: PHYSICIAN ASSISTANT

## 2023-08-16 PROCEDURE — 3080F PR MOST RECENT DIASTOLIC BLOOD PRESSURE >= 90 MM HG: ICD-10-PCS | Mod: CPTII,S$GLB,, | Performed by: PHYSICIAN ASSISTANT

## 2023-08-16 PROCEDURE — 99215 OFFICE O/P EST HI 40 MIN: CPT | Mod: 25,S$GLB,, | Performed by: PHYSICIAN ASSISTANT

## 2023-08-16 PROCEDURE — 1159F MED LIST DOCD IN RCRD: CPT | Mod: CPTII,S$GLB,, | Performed by: PHYSICIAN ASSISTANT

## 2023-08-16 PROCEDURE — 96372 PR INJECTION,THERAP/PROPH/DIAG2ST, IM OR SUBCUT: ICD-10-PCS | Mod: S$GLB,,, | Performed by: PHYSICIAN ASSISTANT

## 2023-08-16 PROCEDURE — 3075F SYST BP GE 130 - 139MM HG: CPT | Mod: CPTII,S$GLB,, | Performed by: PHYSICIAN ASSISTANT

## 2023-08-16 RX ORDER — KETOROLAC TROMETHAMINE 30 MG/ML
60 INJECTION, SOLUTION INTRAMUSCULAR; INTRAVENOUS ONCE
Status: COMPLETED | OUTPATIENT
Start: 2023-08-16 | End: 2023-08-16

## 2023-08-16 RX ORDER — SECUKINUMAB 150 MG/ML
300 INJECTION SUBCUTANEOUS
Qty: 5 EACH | Refills: 0 | Status: SHIPPED | OUTPATIENT
Start: 2023-08-16 | End: 2023-08-18 | Stop reason: DRUGHIGH

## 2023-08-16 RX ORDER — ESTERIFIED ESTROGEN AND METHYLTESTOSTERONE 1.25; 2.5 MG/1; MG/1
TABLET ORAL
COMMUNITY
Start: 2023-08-01

## 2023-08-16 RX ORDER — AZELASTINE 1 MG/ML
SPRAY, METERED NASAL
COMMUNITY
Start: 2023-07-20

## 2023-08-16 RX ORDER — SECUKINUMAB 150 MG/ML
300 INJECTION SUBCUTANEOUS
Qty: 1 EACH | Refills: 2 | Status: SHIPPED | OUTPATIENT
Start: 2023-08-16 | End: 2023-08-18 | Stop reason: DRUGHIGH

## 2023-08-16 RX ORDER — BETAMETHASONE SODIUM PHOSPHATE AND BETAMETHASONE ACETATE 3; 3 MG/ML; MG/ML
6 INJECTION, SUSPENSION INTRA-ARTICULAR; INTRALESIONAL; INTRAMUSCULAR; SOFT TISSUE
Status: COMPLETED | OUTPATIENT
Start: 2023-08-16 | End: 2023-08-16

## 2023-08-16 RX ADMIN — BETAMETHASONE SODIUM PHOSPHATE AND BETAMETHASONE ACETATE 6 MG: 3; 3 INJECTION, SUSPENSION INTRA-ARTICULAR; INTRALESIONAL; INTRAMUSCULAR; SOFT TISSUE at 11:08

## 2023-08-16 RX ADMIN — KETOROLAC TROMETHAMINE 60 MG: 30 INJECTION, SOLUTION INTRAMUSCULAR; INTRAVENOUS at 11:08

## 2023-08-16 NOTE — PROGRESS NOTES
Administered 1 cc Betamethasone 6mg/cc  to 2 ventrogluteal. Pt tolerated well. No acute reaction noted to site. Pt instructed on S/S to report. Advised patient to wait in lobby 15 minutes after receiving injection to monitor for any reactions. Pt verbalized understanding.     Lot: [id number]  Exp: 03/23/2024              Side Effects:  appetite changes, glucose intolerance, insomnia, diaphoresis (sweating), elevated blood pressure, ecchymosis (bruising), rash, headache, injection site reaction/pain, anaphylaxis.     Administered 2 cc  Toradol 30mg/cc  to right ventrogluteal. Pt tolerated well. No acute reaction noted to site. Pt instructed on S/S to report. Advised patient to wait in lobby 15 minutes after receiving injection to monitor for any reactions. Pt verbalized understanding.     Lot: [id number]  Exp: 04/30/2024        Side effects: anaphylaxis, diaphoresis (sweating), injection site reaction/pain, headache, hypertension, ecchymosis (bruising), constipation, abdominal pain.

## 2023-08-16 NOTE — PROGRESS NOTES
Subjective:      Patient ID: Bee Condon is a 37 y.o. female.    Chief Complaint: Psoriatic Arthritis      HPI   Bee Condon  is a 37 y.o. female here for rheumatologic follow-up on PsA and FM.  Failed MTX and rinvoq.  Started humira recently but having s/e (sick feeling cpl hours after inj that lasts a few days as well as rash).  Pt denies active PsO and dactylitis.  A.m. stiffness lasting > 30 min.  C/o multi joint pain - knees, SI, achilles.  No associated weakness.  No Raynaud's.    On low-dose naltrexone for fibromyalgia.  Initially did not think it was doing much to help her symptoms until she ran out of it.  Once she had been back on it for about a week symptoms had improved again.  Overall she is stable from FM standpoint    Recently had to deal with serotonin syndrome.  She is under the care of a psychiatrist.  They lowered 1 of her medications and she is back to baseline.  Failed cymbalta in past - she suspects Serotonin syndrome    Patient c/o photosensitivity but denies fevers, chills, eye pain, shortness of breath, chest pain, hematuria, blood in the stool, rash, sicca symptoms, raynauds, finger ulcerations.  Rheumatologic systems otherwise negative.    Serologies/Labs:  Low titer SHANNON (speckled) - repeat was neg  Neg RF, CCP, HLA B27  Current Treatment:  Humira - s/e  LDN  Previous Treatment:   MTX - intolerance  Rinvoq - ineffective  Cymbalta - serotonin syndrome?      Current Outpatient Medications:     azelastine (ASTELIN) 137 mcg (0.1 %) nasal spray, 1-2 SEN BID PRN, Disp: , Rfl:     cetirizine (ZYRTEC) 10 MG tablet, Take 10 mg by mouth., Disp: , Rfl:     d-mannose 500 mg Cap, Take by mouth., Disp: , Rfl:     estrogens,esterified,-methyltestosterone 1.25-2.5mg (ESTRATEST) per tablet, Take by mouth., Disp: , Rfl:     iron bis-gly/FA/C/B12/Ca/succ (IRON-150 ORAL), Take by mouth., Disp: , Rfl:     levocetirizine (XYZAL) 5 MG tablet, Take 5 mg by mouth every evening., Disp: , Rfl:      lisdexamfetamine (VYVANSE) 30 MG capsule, , Disp: , Rfl:     MAGNESIUM GLYCINATE ORAL, Take by mouth., Disp: , Rfl:     methylPREDNISolone (MEDROL DOSEPACK) 4 mg tablet, use as directed, Disp: 1 each, Rfl: 0    natrexone tablet 4.5 mg, Take 1 tablet (4.5 mg total) by mouth every evening., Disp: 90 tablet, Rfl: 3    ondansetron (ZOFRAN-ODT) 4 MG TbDL, Take 4 mg by mouth daily as needed., Disp: , Rfl:     pantoprazole (PROTONIX) 40 MG tablet, TAKE 1 TABLET(40 MG) BY MOUTH EVERY DAY, Disp: 30 tablet, Rfl: 2    rizatriptan (MAXALT) 10 MG tablet, Take by mouth., Disp: , Rfl:     VIIBRYD 10 mg Tab tablet, Take 10 mg by mouth once daily., Disp: , Rfl:     secukinumab (COSENTYX PEN, 2 PENS,) 150 mg/mL PnIj, Inject 300 mg into the skin every 7 days. For 5 doses, then q 4 weeks thereafter, Disp: 5 each, Rfl: 0    secukinumab (COSENTYX PEN, 2 PENS,) 150 mg/mL PnIj, Inject 300 mg into the skin every 28 days., Disp: 1 each, Rfl: 2    Current Facility-Administered Medications:     betamethasone acetate-betamethasone sodium phosphate injection 6 mg, 6 mg, Intramuscular, 1 time in Clinic/HOD, Vy Lundy PA-C    ketorolac injection 60 mg, 60 mg, Intramuscular, Once, Vy Lundy PA-C    Past Medical History:   Diagnosis Date    Anxiety     Depression     Interstitial cystitis 2019     Family History   Problem Relation Age of Onset    Cancer Mother     Heart disease Father     Diabetes Mellitus Father     Cancer Paternal Aunt     Hyperlipidemia Paternal Aunt      Social History     Socioeconomic History    Marital status:    Tobacco Use    Smoking status: Never    Smokeless tobacco: Never   Substance and Sexual Activity    Alcohol use: Not Currently    Drug use: Never    Sexual activity: Yes     Review of patient's allergies indicates:   Allergen Reactions    Iodine     Oxycodone-acetaminophen Hives and Itching    Serotonin 5ht-3 antagonists     Shellfish containing products        Objective:   BP (!)  "138/93   Pulse 76   Ht 5' 8" (1.727 m)   Wt 127.1 kg (280 lb 3.3 oz)   BMI 42.60 kg/m²   Immunization History   Administered Date(s) Administered    COVID-19, MRNA, LN-S, PF (MODERNA FULL 0.5 ML DOSE) 02/27/2021, 03/25/2021, 11/08/2021    Influenza - Quadrivalent - PF *Preferred* (6 months and older) 12/14/2015, 10/15/2018, 09/19/2019, 09/17/2020, 11/08/2021, 09/16/2022       Physical Exam   Constitutional: She is oriented to person, place, and time. No distress.   HENT:   Head: Normocephalic and atraumatic.   Pulmonary/Chest: Effort normal.   Musculoskeletal:      Cervical back: Normal range of motion.   Neurological: She is alert and oriented to person, place, and time.   Psychiatric: Mood normal.        100% fist b/l  Swelling improved hands  Enthesitis bilat achilles, rt pat tendon, Rt>Lt SI joint  No effusions  Preserved ROM    No results found for this or any previous visit (from the past 672 hour(s)).  Recent Results (from the past 1008 hour(s))   CBC Auto Differential    Collection Time: 07/17/23 11:19 AM   Result Value Ref Range    WBC 6.76 3.90 - 12.70 K/uL    RBC 4.59 4.00 - 5.40 M/uL    Hemoglobin 12.5 12.0 - 16.0 g/dL    Hematocrit 38.5 37.0 - 48.5 %    MCV 84 82 - 98 fL    MCH 27.2 27.0 - 31.0 pg    MCHC 32.5 32.0 - 36.0 g/dL    RDW 13.8 11.5 - 14.5 %    Platelets 365 150 - 450 K/uL    MPV 8.9 (L) 9.2 - 12.9 fL    Immature Granulocytes 0.3 0.0 - 0.5 %    Gran # (ANC) 4.5 1.8 - 7.7 K/uL    Immature Grans (Abs) 0.02 0.00 - 0.04 K/uL    Lymph # 1.6 1.0 - 4.8 K/uL    Mono # 0.4 0.3 - 1.0 K/uL    Eos # 0.2 0.0 - 0.5 K/uL    Baso # 0.04 0.00 - 0.20 K/uL    nRBC 0 0 /100 WBC    Gran % 66.6 38.0 - 73.0 %    Lymph % 23.8 18.0 - 48.0 %    Mono % 6.5 4.0 - 15.0 %    Eosinophil % 2.2 0.0 - 8.0 %    Basophil % 0.6 0.0 - 1.9 %    Differential Method Automated    Comprehensive Metabolic Panel    Collection Time: 07/17/23 11:19 AM   Result Value Ref Range    Sodium 141 136 - 145 mmol/L    Potassium 4.0 3.5 - 5.1 " mmol/L    Chloride 104 95 - 110 mmol/L    CO2 26 23 - 29 mmol/L    Glucose 107 70 - 110 mg/dL    BUN 9 6 - 20 mg/dL    Creatinine 0.9 0.5 - 1.4 mg/dL    Calcium 9.1 8.7 - 10.5 mg/dL    Total Protein 7.5 6.0 - 8.4 g/dL    Albumin 3.6 3.5 - 5.2 g/dL    Total Bilirubin 0.3 0.1 - 1.0 mg/dL    Alkaline Phosphatase 73 55 - 135 U/L    AST 16 10 - 40 U/L    ALT 16 10 - 44 U/L    eGFR >60 >60 mL/min/1.73 m^2    Anion Gap 11 8 - 16 mmol/L          Lab Results   Component Value Date    TBGOLDPLUS Negative 06/14/2023      Lab Results   Component Value Date    HEPAIGM Non-reactive 06/12/2023    HEPBIGM Non-reactive 06/12/2023    HEPBCAB Negative 05/24/2022    HEPCAB Non-reactive 06/12/2023        Imaging  I have personally reviewed images and reports as below.  I agree with the interpretation.  X-Ray Foot Complete Bilateral  Order: [id number]  Status: Final result     Visible to patient: Yes (seen)     Next appt: 06/14/2023 at 09:30 AM in Gastroenterology (Nohelia Grissom PA-C)     Dx: Heel pain, bilateral     0 Result Notes  Details    Reading Physician Reading Date Result Priority   Yogi Stevenson MD  [phone] 10/26/2022 Routine     Narrative & Impression  EXAMINATION:  XR FOOT COMPLETE 3 VIEW BILATERAL     CLINICAL HISTORY:  Pain in right foot     TECHNIQUE:  AP, lateral, and oblique views of both feet were performed.     COMPARISON:  None     FINDINGS:  No acute osseous abnormality.  Bilateral mild hallux valgus deformity with 1st metatarsal median eminence prominence.  Minimal early bilateral calcaneal enthesophyte changes.  Soft tissues unremarkable.     Impression:     As above        Electronically signed by: Yogi Stevenson MD  Date:                                            10/26/2022  Time:                                           10:35       Assessment:     1. Polyarticular psoriatic arthritis    2. Medication monitoring encounter    3. High risk medication use    4. Immunocompromised state due to drug  therapy    5. Fibromyalgia    6. Adverse effect of drug, initial encounter                Plan:     Bee was seen today for psoriatic arthritis.    Diagnoses and all orders for this visit:    Polyarticular psoriatic arthritis  -     secukinumab (COSENTYX PEN, 2 PENS,) 150 mg/mL PnIj; Inject 300 mg into the skin every 7 days. For 5 doses, then q 4 weeks thereafter  -     secukinumab (COSENTYX PEN, 2 PENS,) 150 mg/mL PnIj; Inject 300 mg into the skin every 28 days.  -     ketorolac injection 60 mg  -     betamethasone acetate-betamethasone sodium phosphate injection 6 mg    Medication monitoring encounter    High risk medication use    Immunocompromised state due to drug therapy    Fibromyalgia    Adverse effect of drug, initial encounter            PsA/PsO - woresning  Worsening joint pain/swelling and stiffness  Labs reviewed  CBC/CMP stable  ESR WNL  CRP elevated but improved from prior  DC Humira d/t s/e  Start cosentyx\  Discussed risks and benefits Humira  Literature provided to pt today  TB and hep serologies updated 6/2023  IM Celestone 6 mg w IM toradol 60 mg today  Consider EMG if burning pain persists  FM  C/w LDN  Tolerating it well  Consider addition of gabapentin  Drug therapy requiring intensive monitoring for toxicity  High Risk Medication Monitoring encounter  No current medication related issues, no evidence of toxicity  I ordered labs for toxicity monitoring, have personally reviewed the findings, and discussed them with the patient.  Pending labs will be sent via the portal  Compromised immune system secondary to autoimmune disease and/or use of immunosuppressive drugs.  Monitor carefully for infections.  Advised patient to get immediate medical care if any infection arises.  Also advised strict adherence age-appropriate vaccinations and cancer screenings with PCP.  Patient advised to hold DMARD and/or biologic therapy for signs of infection or for surgery. If you are unsure what to do please  call our office for instruction.[facility] [phone]  Return to clinic: 3 mos, reg 4 prior    Follow up in about 3 months (around 11/16/2023).    The patient understands, chooses and consents to this plan and accepts all the risks which include but are not limited to the risks mentioned above.     Disclaimer: This note was prepared using a voice recognition system and is likely to have sound alike errors within the text.

## 2023-08-17 ENCOUNTER — TELEPHONE (OUTPATIENT)
Dept: PHARMACY | Facility: CLINIC | Age: 38
End: 2023-08-17
Payer: COMMERCIAL

## 2023-08-17 NOTE — TELEPHONE ENCOUNTER
Zahira, this is Jamilah Hennessy, clinical pharmacist with [facility] that is part of your care team.  We have begun working on your prescription that your doctor has sent us. Our next steps include:     Working with your insurance company to obtain approval for your medication  Working with you to ensure your medication is affordable     We will be calling you along the way with updates on your medication but if you have any concerns or receive information that you would like to discuss please reach us at [phone].    Welcome call outcome: Patient/caregiver reached

## 2023-08-18 ENCOUNTER — TELEPHONE (OUTPATIENT)
Dept: PHARMACY | Facility: CLINIC | Age: 38
End: 2023-08-18
Payer: COMMERCIAL

## 2023-08-18 ENCOUNTER — PATIENT MESSAGE (OUTPATIENT)
Dept: RHEUMATOLOGY | Facility: CLINIC | Age: 38
End: 2023-08-18
Payer: COMMERCIAL

## 2023-08-18 RX ORDER — IXEKIZUMAB 80 MG/ML
80 INJECTION, SOLUTION SUBCUTANEOUS
Qty: 1 ML | Refills: 3 | Status: ACTIVE | OUTPATIENT
Start: 2023-08-18 | End: 2023-08-23

## 2023-08-18 RX ORDER — SECUKINUMAB 150 MG/ML
150 INJECTION SUBCUTANEOUS
Qty: 4 ML | Refills: 0 | Status: ACTIVE | OUTPATIENT
Start: 2023-08-18 | End: 2023-08-18

## 2023-08-18 RX ORDER — IXEKIZUMAB 80 MG/ML
160 INJECTION, SOLUTION SUBCUTANEOUS ONCE
Qty: 2 ML | Refills: 0 | Status: ACTIVE | OUTPATIENT
Start: 2023-08-18 | End: 2023-08-24 | Stop reason: ALTCHOICE

## 2023-08-18 NOTE — TELEPHONE ENCOUNTER
Zahira, this is Jamilah Hennessy, clinical pharmacist with [facility] that is part of your care team.  We have begun working on your prescription that your doctor has sent us. Our next steps include:     Working with your insurance company to obtain approval for your medication  Working with you to ensure your medication is affordable     We will be calling you along the way with updates on your medication but if you have any concerns or receive information that you would like to discuss please reach us at [phone].    Welcome call outcome: Patient/caregiver reached

## 2023-08-21 ENCOUNTER — TELEPHONE (OUTPATIENT)
Dept: RHEUMATOLOGY | Facility: CLINIC | Age: 38
End: 2023-08-21
Payer: COMMERCIAL

## 2023-08-21 ENCOUNTER — PATIENT MESSAGE (OUTPATIENT)
Dept: RHEUMATOLOGY | Facility: CLINIC | Age: 38
End: 2023-08-21
Payer: COMMERCIAL

## 2023-08-21 NOTE — TELEPHONE ENCOUNTER
----- Message from Timur Moore sent at 8/21/2023 10:04 AM CDT -----  Contact: Aleksandra Lake with [employer] is calling to check on an authorization for Cosentyx. Needing to know if provider could like to continue prescription. Please give Aleksandra  a call at [phone] when calling  patients reference key is [id number]

## 2023-08-23 ENCOUNTER — OFFICE VISIT (OUTPATIENT)
Dept: RHEUMATOLOGY | Facility: CLINIC | Age: 38
End: 2023-08-23
Payer: COMMERCIAL

## 2023-08-23 DIAGNOSIS — L40.59 POLYARTICULAR PSORIATIC ARTHRITIS: Primary | ICD-10-CM

## 2023-08-23 DIAGNOSIS — Z79.899 HIGH RISK MEDICATION USE: ICD-10-CM

## 2023-08-23 DIAGNOSIS — Z79.899 IMMUNOCOMPROMISED STATE DUE TO DRUG THERAPY: ICD-10-CM

## 2023-08-23 DIAGNOSIS — Z51.81 MEDICATION MONITORING ENCOUNTER: ICD-10-CM

## 2023-08-23 DIAGNOSIS — D84.821 IMMUNOCOMPROMISED STATE DUE TO DRUG THERAPY: ICD-10-CM

## 2023-08-23 PROCEDURE — 1160F RVW MEDS BY RX/DR IN RCRD: CPT | Mod: CPTII,95,, | Performed by: PHYSICIAN ASSISTANT

## 2023-08-23 PROCEDURE — 1159F PR MEDICATION LIST DOCUMENTED IN MEDICAL RECORD: ICD-10-PCS | Mod: CPTII,95,, | Performed by: PHYSICIAN ASSISTANT

## 2023-08-23 PROCEDURE — 1160F PR REVIEW ALL MEDS BY PRESCRIBER/CLIN PHARMACIST DOCUMENTED: ICD-10-PCS | Mod: CPTII,95,, | Performed by: PHYSICIAN ASSISTANT

## 2023-08-23 PROCEDURE — 99214 PR OFFICE/OUTPT VISIT, EST, LEVL IV, 30-39 MIN: ICD-10-PCS | Mod: 95,,, | Performed by: PHYSICIAN ASSISTANT

## 2023-08-23 PROCEDURE — 1159F MED LIST DOCD IN RCRD: CPT | Mod: CPTII,95,, | Performed by: PHYSICIAN ASSISTANT

## 2023-08-23 PROCEDURE — 99214 OFFICE O/P EST MOD 30 MIN: CPT | Mod: 95,,, | Performed by: PHYSICIAN ASSISTANT

## 2023-08-23 RX ORDER — CLONAZEPAM 0.5 MG/1
0.5 TABLET ORAL 2 TIMES DAILY PRN
COMMUNITY
Start: 2023-08-17

## 2023-08-23 RX ORDER — SECUKINUMAB 150 MG/ML
150 INJECTION SUBCUTANEOUS
Qty: 10 ML | Refills: 0 | Status: SHIPPED | OUTPATIENT
Start: 2023-08-23 | End: 2023-08-24 | Stop reason: DRUGHIGH

## 2023-08-23 RX ORDER — SECUKINUMAB 150 MG/ML
150 INJECTION SUBCUTANEOUS
Qty: 1 EACH | Refills: 6 | Status: SHIPPED | OUTPATIENT
Start: 2023-08-23 | End: 2023-08-24 | Stop reason: DRUGHIGH

## 2023-08-23 NOTE — PROGRESS NOTES
The patient location is: car  The chief complaint leading to consultation is: f/u PsA    Visit type: audiovisual    Face to Face time with patient: 12 min  24 minutes of total time spent on the encounter, which includes face to face time and non-face to face time preparing to see the patient (eg, review of tests), Obtaining and/or reviewing separately obtained history, Documenting clinical information in the electronic or other health record, Independently interpreting results (not separately reported) and communicating results to the patient/family/caregiver, or Care coordination (not separately reported).         Each patient to whom he or she provides medical services by telemedicine is:  (1) informed of the relationship between the physician and patient and the respective role of any other health care provider with respect to management of the patient; and (2) notified that he or she may decline to receive medical services by telemedicine and may withdraw from such care at any time.    Notes:     Subjective:      Patient ID: Bee Condon is a 37 y.o. female.    Chief Complaint: No chief complaint on file.      HPI   Bee Condon  is a 37 y.o. female here for rheumatologic follow-up on PsA and FM.  Does not have history of PsO.  Failed MTX, rinvoq and humira.  A.m. stiffness lasting > 30 min.  C/o multi joint pain - knees, SI, achilles.  No associated weakness.  No Raynaud's.  We talked about starting Cosentyx.  Initially we were told Taltz was the preferred drug.  That has since changed now Cosentyx is on the preferred list.    Patient with history of abdominal pain.  She is on pantoprazole.  They suspect psoriatic arthritis is contributing to the abdominal pain.  GI is hoping for her to get better control of psoriatic arthritis so they can do further workup of her abdominal pain.  She would like to go with 1 that has lower risk of GI side effects.    On low-dose naltrexone for fibromyalgia.  Initially did  not think it was doing much to help her symptoms until she ran out of it.  Once she had been back on it for about a week symptoms had improved again.  Overall she is stable from FM standpoint.    Recently had to deal with serotonin syndrome.  She is under the care of a psychiatrist.  They lowered 1 of her medications and she is back to baseline.  Failed cymbalta in past - she suspects Serotonin syndrome    Patient c/o photosensitivity but denies fevers, chills, eye pain, shortness of breath, chest pain, hematuria, blood in the stool, rash, sicca symptoms, raynauds, finger ulcerations.  Rheumatologic systems otherwise negative.    Serologies/Labs:  Low titer SHANNON (speckled) - repeat was neg  Neg RF, CCP, HLA B27  Current Treatment:  Humira - s/e  LDN  Previous Treatment:   MTX - intolerance  Rinvoq - ineffective  Cymbalta - serotonin syndrome?      Current Outpatient Medications:     azelastine (ASTELIN) 137 mcg (0.1 %) nasal spray, 1-2 SEN BID PRN, Disp: , Rfl:     cetirizine (ZYRTEC) 10 MG tablet, Take 10 mg by mouth., Disp: , Rfl:     clonazePAM (KLONOPIN) 0.5 MG tablet, Take 0.5 mg by mouth 2 (two) times daily as needed., Disp: , Rfl:     d-mannose 500 mg Cap, Take by mouth., Disp: , Rfl:     estrogens,esterified,-methyltestosterone 1.25-2.5mg (ESTRATEST) per tablet, Take by mouth., Disp: , Rfl:     iron bis-gly/FA/C/B12/Ca/succ (IRON-150 ORAL), Take by mouth., Disp: , Rfl:     ixekizumab (TALTZ AUTOINJECTOR) 80 mg/mL AtIn, Inject 80 mg into the skin every 28 days., Disp: 1 mL, Rfl: 3    levocetirizine (XYZAL) 5 MG tablet, Take 5 mg by mouth every evening., Disp: , Rfl:     lisdexamfetamine (VYVANSE) 30 MG capsule, , Disp: , Rfl:     MAGNESIUM GLYCINATE ORAL, Take by mouth., Disp: , Rfl:     methylPREDNISolone (MEDROL DOSEPACK) 4 mg tablet, use as directed, Disp: 1 each, Rfl: 0    natrexone tablet 4.5 mg, Take 1 tablet (4.5 mg total) by mouth every evening., Disp: 90 tablet, Rfl: 3    ondansetron (ZOFRAN-ODT) 4 MG  TbDL, Take 4 mg by mouth daily as needed., Disp: , Rfl:     pantoprazole (PROTONIX) 40 MG tablet, TAKE 1 TABLET(40 MG) BY MOUTH EVERY DAY, Disp: 30 tablet, Rfl: 2    rizatriptan (MAXALT) 10 MG tablet, Take by mouth., Disp: , Rfl:     VIIBRYD 10 mg Tab tablet, Take 10 mg by mouth once daily., Disp: , Rfl:     Past Medical History:   Diagnosis Date    Anxiety     Depression     Interstitial cystitis 2019     Family History   Problem Relation Age of Onset    Cancer Mother     Heart disease Father     Diabetes Mellitus Father     Cancer Paternal Aunt     Hyperlipidemia Paternal Aunt      Social History     Socioeconomic History    Marital status:    Tobacco Use    Smoking status: Never    Smokeless tobacco: Never   Substance and Sexual Activity    Alcohol use: Not Currently    Drug use: Never    Sexual activity: Yes     Review of patient's allergies indicates:   Allergen Reactions    Iodine     Oxycodone-acetaminophen Hives and Itching    Serotonin 5ht-3 antagonists     Shellfish containing products        Objective:   There were no vitals taken for this visit.  Immunization History   Administered Date(s) Administered    COVID-19, MRNA, LN-S, PF (MODERNA FULL 0.5 ML DOSE) 02/27/2021, 03/25/2021, 11/08/2021    Influenza - Quadrivalent - PF *Preferred* (6 months and older) 12/14/2015, 10/15/2018, 09/19/2019, 09/17/2020, 11/08/2021, 09/16/2022       Physical Exam   Constitutional: She is oriented to person, place, and time. No distress.   HENT:   Head: Normocephalic and atraumatic.   Pulmonary/Chest: Effort normal.   Musculoskeletal:      Cervical back: Normal range of motion.   Neurological: She is alert and oriented to person, place, and time.   Psychiatric: Mood normal.        100% fist b/l  Swelling improved hands  Enthesitis bilat achilles, rt pat tendon, Rt>Lt SI joint  No effusions  Preserved ROM    No results found for this or any previous visit (from the past 672 hour(s)).  Recent Results (from the past  1008 hour(s))   CBC Auto Differential    Collection Time: 07/17/23 11:19 AM   Result Value Ref Range    WBC 6.76 3.90 - 12.70 K/uL    RBC 4.59 4.00 - 5.40 M/uL    Hemoglobin 12.5 12.0 - 16.0 g/dL    Hematocrit 38.5 37.0 - 48.5 %    MCV 84 82 - 98 fL    MCH 27.2 27.0 - 31.0 pg    MCHC 32.5 32.0 - 36.0 g/dL    RDW 13.8 11.5 - 14.5 %    Platelets 365 150 - 450 K/uL    MPV 8.9 (L) 9.2 - 12.9 fL    Immature Granulocytes 0.3 0.0 - 0.5 %    Gran # (ANC) 4.5 1.8 - 7.7 K/uL    Immature Grans (Abs) 0.02 0.00 - 0.04 K/uL    Lymph # 1.6 1.0 - 4.8 K/uL    Mono # 0.4 0.3 - 1.0 K/uL    Eos # 0.2 0.0 - 0.5 K/uL    Baso # 0.04 0.00 - 0.20 K/uL    nRBC 0 0 /100 WBC    Gran % 66.6 38.0 - 73.0 %    Lymph % 23.8 18.0 - 48.0 %    Mono % 6.5 4.0 - 15.0 %    Eosinophil % 2.2 0.0 - 8.0 %    Basophil % 0.6 0.0 - 1.9 %    Differential Method Automated    Comprehensive Metabolic Panel    Collection Time: 07/17/23 11:19 AM   Result Value Ref Range    Sodium 141 136 - 145 mmol/L    Potassium 4.0 3.5 - 5.1 mmol/L    Chloride 104 95 - 110 mmol/L    CO2 26 23 - 29 mmol/L    Glucose 107 70 - 110 mg/dL    BUN 9 6 - 20 mg/dL    Creatinine 0.9 0.5 - 1.4 mg/dL    Calcium 9.1 8.7 - 10.5 mg/dL    Total Protein 7.5 6.0 - 8.4 g/dL    Albumin 3.6 3.5 - 5.2 g/dL    Total Bilirubin 0.3 0.1 - 1.0 mg/dL    Alkaline Phosphatase 73 55 - 135 U/L    AST 16 10 - 40 U/L    ALT 16 10 - 44 U/L    eGFR >60 >60 mL/min/1.73 m^2    Anion Gap 11 8 - 16 mmol/L          Lab Results   Component Value Date    TBGOLDPLUS Negative 06/14/2023      Lab Results   Component Value Date    HEPAIGM Non-reactive 06/12/2023    HEPBIGM Non-reactive 06/12/2023    HEPBCAB Negative 05/24/2022    HEPCAB Non-reactive 06/12/2023        Imaging  I have personally reviewed images and reports as below.  I agree with the interpretation.  X-Ray Foot Complete Bilateral  Order: [id number]  Status: Final result     Visible to patient: Yes (seen)     Next appt: 06/14/2023 at 09:30 AM in Gastroenterology  (Nohelia Grissom PA-C)     Dx: Heel pain, bilateral     0 Result Notes  Details    Reading Physician Reading Date Result Priority   Yogi Stevenson MD  [phone] 10/26/2022 Routine     Narrative & Impression  EXAMINATION:  XR FOOT COMPLETE 3 VIEW BILATERAL     CLINICAL HISTORY:  Pain in right foot     TECHNIQUE:  AP, lateral, and oblique views of both feet were performed.     COMPARISON:  None     FINDINGS:  No acute osseous abnormality.  Bilateral mild hallux valgus deformity with 1st metatarsal median eminence prominence.  Minimal early bilateral calcaneal enthesophyte changes.  Soft tissues unremarkable.     Impression:     As above        Electronically signed by: Yogi Stevenson MD  Date:                                            10/26/2022  Time:                                           10:35       Assessment:     1. Polyarticular psoriatic arthritis    2. Medication monitoring encounter    3. High risk medication use    4. Immunocompromised state due to drug therapy                Plan:     Diagnoses and all orders for this visit:    Polyarticular psoriatic arthritis    Medication monitoring encounter    High risk medication use    Immunocompromised state due to drug therapy            PsA/PsO - woresning  Worsening joint pain/swelling and stiffness  Labs reviewed  CBC/CMP stable  ESR WNL  CRP elevated but improved from prior  Start cosentyx - PsA dose  Discussed risks and benefits, as well as s/e associated in regards to GI  New rx submitted  TB and hep serologies updated 6/2023  Consider EMG if burning pain persists  FM  C/w LDN  Tolerating it well  Consider addition of gabapentin  Drug therapy requiring intensive monitoring for toxicity  High Risk Medication Monitoring encounter  No current medication related issues, no evidence of toxicity  I ordered labs for toxicity monitoring, have personally reviewed the findings, and discussed them with the patient.  Pending labs will be sent via the  portal  Compromised immune system secondary to autoimmune disease and/or use of immunosuppressive drugs.  Monitor carefully for infections.  Advised patient to get immediate medical care if any infection arises.  Also advised strict adherence age-appropriate vaccinations and cancer screenings with PCP.  Patient advised to hold DMARD and/or biologic therapy for signs of infection or for surgery. If you are unsure what to do please call our office for instruction.[facility] [phone]  Return to clinic: 3 mos, reg 4 prior    No follow-ups on file.    The patient understands, chooses and consents to this plan and accepts all the risks which include but are not limited to the risks mentioned above.     Disclaimer: This note was prepared using a voice recognition system and is likely to have sound alike errors within the text.

## 2023-08-24 RX ORDER — SECUKINUMAB 150 MG/ML
150 INJECTION SUBCUTANEOUS
Qty: 1 ML | Refills: 6 | Status: ACTIVE | OUTPATIENT
Start: 2023-08-24 | End: 2023-11-16

## 2023-08-24 RX ORDER — SECUKINUMAB 150 MG/ML
INJECTION SUBCUTANEOUS
Qty: 5 ML | Refills: 0 | Status: ACTIVE | OUTPATIENT
Start: 2023-08-24 | End: 2023-11-16

## 2023-08-29 ENCOUNTER — PATIENT MESSAGE (OUTPATIENT)
Dept: RHEUMATOLOGY | Facility: CLINIC | Age: 38
End: 2023-08-29
Payer: COMMERCIAL

## 2023-08-30 ENCOUNTER — TELEPHONE (OUTPATIENT)
Dept: RHEUMATOLOGY | Facility: CLINIC | Age: 38
End: 2023-08-30
Payer: COMMERCIAL

## 2023-08-30 NOTE — TELEPHONE ENCOUNTER
----- Message from Jamilah Hennessy PharmD sent at 8/29/2023  4:29 PM CDT -----  Regarding: Jael  Spoke with Alexa at insurance company. PA for starter and maintenance dose was done correctly and is documented in their system. It appears the [facility] pharmacist was trying to run it for the 300mg pack instead of the 150mg pack. Alexa is going to call the pharmacy and tell them how to run it correctly so that the claim will process.    Jamilah

## 2023-09-12 ENCOUNTER — PATIENT MESSAGE (OUTPATIENT)
Dept: RHEUMATOLOGY | Facility: CLINIC | Age: 38
End: 2023-09-12
Payer: COMMERCIAL

## 2023-09-19 ENCOUNTER — PATIENT MESSAGE (OUTPATIENT)
Dept: RHEUMATOLOGY | Facility: CLINIC | Age: 38
End: 2023-09-19

## 2023-09-19 ENCOUNTER — LAB VISIT (OUTPATIENT)
Dept: LAB | Facility: HOSPITAL | Age: 38
End: 2023-09-19
Attending: PHYSICIAN ASSISTANT
Payer: COMMERCIAL

## 2023-09-19 ENCOUNTER — CLINICAL SUPPORT (OUTPATIENT)
Dept: RHEUMATOLOGY | Facility: CLINIC | Age: 38
End: 2023-09-19
Payer: COMMERCIAL

## 2023-09-19 DIAGNOSIS — L40.59 POLYARTICULAR PSORIATIC ARTHRITIS: ICD-10-CM

## 2023-09-19 DIAGNOSIS — Z51.81 MEDICATION MONITORING ENCOUNTER: ICD-10-CM

## 2023-09-19 DIAGNOSIS — D84.9 IMMUNOCOMPROMISED: ICD-10-CM

## 2023-09-19 DIAGNOSIS — D84.9 IMMUNOSUPPRESSED STATUS: ICD-10-CM

## 2023-09-19 DIAGNOSIS — R21 RASH: Primary | ICD-10-CM

## 2023-09-19 DIAGNOSIS — D84.9 IMMUNOSUPPRESSED STATUS: Primary | ICD-10-CM

## 2023-09-19 LAB
ALBUMIN SERPL BCP-MCNC: 3.7 G/DL (ref 3.5–5.2)
ALP SERPL-CCNC: 81 U/L (ref 55–135)
ALT SERPL W/O P-5'-P-CCNC: 17 U/L (ref 10–44)
ANION GAP SERPL CALC-SCNC: 9 MMOL/L (ref 8–16)
AST SERPL-CCNC: 15 U/L (ref 10–40)
BASOPHILS # BLD AUTO: 0.04 K/UL (ref 0–0.2)
BASOPHILS NFR BLD: 0.5 % (ref 0–1.9)
BILIRUB SERPL-MCNC: 0.3 MG/DL (ref 0.1–1)
BUN SERPL-MCNC: 9 MG/DL (ref 6–20)
CALCIUM SERPL-MCNC: 9.4 MG/DL (ref 8.7–10.5)
CHLORIDE SERPL-SCNC: 101 MMOL/L (ref 95–110)
CO2 SERPL-SCNC: 27 MMOL/L (ref 23–29)
CREAT SERPL-MCNC: 1 MG/DL (ref 0.5–1.4)
CRP SERPL-MCNC: 45.9 MG/L (ref 0–8.2)
DIFFERENTIAL METHOD: ABNORMAL
EOSINOPHIL # BLD AUTO: 0.2 K/UL (ref 0–0.5)
EOSINOPHIL NFR BLD: 2.6 % (ref 0–8)
ERYTHROCYTE [DISTWIDTH] IN BLOOD BY AUTOMATED COUNT: 12.7 % (ref 11.5–14.5)
ERYTHROCYTE [SEDIMENTATION RATE] IN BLOOD BY PHOTOMETRIC METHOD: 26 MM/HR (ref 0–36)
EST. GFR  (NO RACE VARIABLE): >60 ML/MIN/1.73 M^2
GLUCOSE SERPL-MCNC: 93 MG/DL (ref 70–110)
HAV IGM SERPL QL IA: NORMAL
HBV CORE IGM SERPL QL IA: NORMAL
HBV SURFACE AG SERPL QL IA: NORMAL
HCT VFR BLD AUTO: 41 % (ref 37–48.5)
HCV AB SERPL QL IA: NORMAL
HGB BLD-MCNC: 13.3 G/DL (ref 12–16)
IMM GRANULOCYTES # BLD AUTO: 0.02 K/UL (ref 0–0.04)
IMM GRANULOCYTES NFR BLD AUTO: 0.3 % (ref 0–0.5)
LYMPHOCYTES # BLD AUTO: 1.9 K/UL (ref 1–4.8)
LYMPHOCYTES NFR BLD: 25.6 % (ref 18–48)
MCH RBC QN AUTO: 27.4 PG (ref 27–31)
MCHC RBC AUTO-ENTMCNC: 32.4 G/DL (ref 32–36)
MCV RBC AUTO: 85 FL (ref 82–98)
MONOCYTES # BLD AUTO: 0.6 K/UL (ref 0.3–1)
MONOCYTES NFR BLD: 7.5 % (ref 4–15)
NEUTROPHILS # BLD AUTO: 4.7 K/UL (ref 1.8–7.7)
NEUTROPHILS NFR BLD: 63.5 % (ref 38–73)
NRBC BLD-RTO: 0 /100 WBC
PLATELET # BLD AUTO: 409 K/UL (ref 150–450)
PMV BLD AUTO: 8.8 FL (ref 9.2–12.9)
POTASSIUM SERPL-SCNC: 4.1 MMOL/L (ref 3.5–5.1)
PROT SERPL-MCNC: 7.8 G/DL (ref 6–8.4)
RBC # BLD AUTO: 4.85 M/UL (ref 4–5.4)
SODIUM SERPL-SCNC: 137 MMOL/L (ref 136–145)
WBC # BLD AUTO: 7.43 K/UL (ref 3.9–12.7)

## 2023-09-19 PROCEDURE — 90694 VACC AIIV4 NO PRSRV 0.5ML IM: CPT | Mod: S$GLB,,, | Performed by: PHYSICIAN ASSISTANT

## 2023-09-19 PROCEDURE — 90471 IMMUNIZATION ADMIN: CPT | Mod: S$GLB,,, | Performed by: PHYSICIAN ASSISTANT

## 2023-09-19 PROCEDURE — 85652 RBC SED RATE AUTOMATED: CPT | Performed by: PHYSICIAN ASSISTANT

## 2023-09-19 PROCEDURE — 99999 PR PBB SHADOW E&M-EST. PATIENT-LVL III: ICD-10-PCS | Mod: PBBFAC,,,

## 2023-09-19 PROCEDURE — 36415 COLL VENOUS BLD VENIPUNCTURE: CPT | Performed by: PHYSICIAN ASSISTANT

## 2023-09-19 PROCEDURE — 80053 COMPREHEN METABOLIC PANEL: CPT | Performed by: PHYSICIAN ASSISTANT

## 2023-09-19 PROCEDURE — 90694 FLU VACCINE - QUADRIVALENT - ADJUVANTED: ICD-10-PCS | Mod: S$GLB,,, | Performed by: PHYSICIAN ASSISTANT

## 2023-09-19 PROCEDURE — 86140 C-REACTIVE PROTEIN: CPT | Performed by: PHYSICIAN ASSISTANT

## 2023-09-19 PROCEDURE — 99999 PR PBB SHADOW E&M-EST. PATIENT-LVL III: CPT | Mod: PBBFAC,,,

## 2023-09-19 PROCEDURE — 86480 TB TEST CELL IMMUN MEASURE: CPT | Performed by: PHYSICIAN ASSISTANT

## 2023-09-19 PROCEDURE — 85025 COMPLETE CBC W/AUTO DIFF WBC: CPT | Performed by: PHYSICIAN ASSISTANT

## 2023-09-19 PROCEDURE — 90471 FLU VACCINE - QUADRIVALENT - ADJUVANTED: ICD-10-PCS | Mod: S$GLB,,, | Performed by: PHYSICIAN ASSISTANT

## 2023-09-19 PROCEDURE — 80074 ACUTE HEPATITIS PANEL: CPT | Performed by: PHYSICIAN ASSISTANT

## 2023-09-20 ENCOUNTER — OFFICE VISIT (OUTPATIENT)
Dept: DERMATOLOGY | Facility: CLINIC | Age: 38
End: 2023-09-20
Payer: COMMERCIAL

## 2023-09-20 DIAGNOSIS — L30.9 ECZEMA, UNSPECIFIED TYPE: Primary | ICD-10-CM

## 2023-09-20 LAB
GAMMA INTERFERON BACKGROUND BLD IA-ACNC: 0.06 IU/ML
M TB IFN-G CD4+ BCKGRND COR BLD-ACNC: 0 IU/ML
M TB IFN-G CD4+ BCKGRND COR BLD-ACNC: 0.01 IU/ML
MITOGEN IGNF BCKGRD COR BLD-ACNC: 9.94 IU/ML
TB GOLD PLUS: NEGATIVE

## 2023-09-20 PROCEDURE — 99204 OFFICE O/P NEW MOD 45 MIN: CPT | Mod: 95,,, | Performed by: PHYSICIAN ASSISTANT

## 2023-09-20 PROCEDURE — 99204 PR OFFICE/OUTPT VISIT, NEW, LEVL IV, 45-59 MIN: ICD-10-PCS | Mod: 95,,, | Performed by: PHYSICIAN ASSISTANT

## 2023-09-20 NOTE — PROGRESS NOTES
"  Subjective:      Patient ID:  Bee Condon is a 38 y.o. female who presents for No chief complaint on file.    The patient location is: [place], [place]  The chief complaint leading to consultation is: rash    Visit type: audiovisual    Face to Face time with patient: 10 minutes  15 minutes of total time spent on the encounter, which includes face to face time and non-face to face time preparing to see the patient (eg, review of tests), Obtaining and/or reviewing separately obtained history, Documenting clinical information in the electronic or other health record, Independently interpreting results (not separately reported) and communicating results to the patient/family/caregiver, or Care coordination (not separately reported).         Each patient to whom he or she provides medical services by telemedicine is:  (1) informed of the relationship between the physician and patient and the respective role of any other health care provider with respect to management of the patient; and (2) notified that he or she may decline to receive medical services by telemedicine and may withdraw from such care at any time.    Notes:     C/o rash of forearms. States it appeared after starting Humira (end of July). Further states Humira was not helpufl for PsA, so it was stopped, and she was given IM steroid injection. Believes the bumps got a little better, but when started on Cosyntex, the bumps came back. + Fine bumps, looks like "goose bumps", itchy. Denies redness unless scratched. Believes it is spreading further up her arms. Endorses general itching of body and ears, but no sb other rashes. Using Cetaphil Cream, Cetaphil Bar. Endorses hot showers, duration 15-20 minutes. Hx of eczema as child, and endorses sensitive skin.     Previous tx: amlactin (irritated), Sarna anti-itch lotion    Hx of ACD of face (? Chemical sunscreen vs. Preservative- Cera Ve AM) diagnosed by outside derm 2018 by chart review. "             Review of Systems   Constitutional:  Negative for fever and chills.   Gastrointestinal:  Negative for nausea and vomiting.   Skin:  Positive for itching, rash, dry skin and activity-related sunscreen use. Negative for sun sensitivity, daily sunscreen use, recent sunburn, dry lips and abscesses.   Hematologic/Lymphatic: Does not bruise/bleed easily.       Objective:   Physical Exam   Constitutional: She appears well-developed and well-nourished. No distress.   Neurological: She is alert and oriented to person, place, and time. She is not disoriented.   Psychiatric: She has a normal mood and affect.   Skin:   Areas Examined (abnormalities noted in diagram):   Head / Face Inspection Performed  RUE Inspected  LUE Inspection Performed            Diagram Legend     Erythematous scaling macule/papule c/w actinic keratosis       Vascular papule c/w angioma      Pigmented verrucoid papule/plaque c/w seborrheic keratosis      Yellow umbilicated papule c/w sebaceous hyperplasia      Irregularly shaped tan macule c/w lentigo     1-2 mm smooth white papules consistent with Milia      Movable subcutaneous cyst with punctum c/w epidermal inclusion cyst      Subcutaneous movable cyst c/w pilar cyst      Firm pink to brown papule c/w dermatofibroma      Pedunculated fleshy papule(s) c/w skin tag(s)      Evenly pigmented macule c/w junctional nevus     Mildly variegated pigmented, slightly irregular-bordered macule c/w mildly atypical nevus      Flesh colored to evenly pigmented papule c/w intradermal nevus       Pink pearly papule/plaque c/w basal cell carcinoma      Erythematous hyperkeratotic cursted plaque c/w SCC      Surgical scar with no sign of skin cancer recurrence      Open and closed comedones      Inflammatory papules and pustules      Verrucoid papule consistent consistent with wart     Erythematous eczematous patches and plaques     Dystrophic onycholytic nail with subungual debris c/w onychomycosis      Umbilicated papule    Erythematous-base heme-crusted tan verrucoid plaque consistent with inflamed seborrheic keratosis     Erythematous Silvery Scaling Plaque c/w Psoriasis     See annotation                Assessment / Plan:        Eczema, unspecified type  -     triamcinolone acetonide 0.1% (KENALOG) 0.1 % cream; Apply topically 2 (two) times daily. AAA bid prn rash of arms. Moderate steroid.  Dispense: 80 g; Refill: 0  Ddx: KP vs. Other eczematous vs. Hypersensitivity other  Trial of above rx. Gentle skin care and emollients. If worsening, would reconsider biopsy.            No follow-ups on file.

## 2023-09-21 RX ORDER — TRIAMCINOLONE ACETONIDE 1 MG/G
CREAM TOPICAL 2 TIMES DAILY
Qty: 80 G | Refills: 0 | Status: SHIPPED | OUTPATIENT
Start: 2023-09-21 | End: 2023-09-28 | Stop reason: SDUPTHER

## 2023-09-22 ENCOUNTER — PATIENT MESSAGE (OUTPATIENT)
Dept: DERMATOLOGY | Facility: CLINIC | Age: 38
End: 2023-09-22
Payer: COMMERCIAL

## 2023-09-24 ENCOUNTER — PATIENT MESSAGE (OUTPATIENT)
Dept: RHEUMATOLOGY | Facility: CLINIC | Age: 38
End: 2023-09-24
Payer: COMMERCIAL

## 2023-09-26 ENCOUNTER — PATIENT MESSAGE (OUTPATIENT)
Dept: RHEUMATOLOGY | Facility: CLINIC | Age: 38
End: 2023-09-26
Payer: COMMERCIAL

## 2023-09-28 DIAGNOSIS — L30.9 ECZEMA, UNSPECIFIED TYPE: Primary | ICD-10-CM

## 2023-09-28 RX ORDER — TRIAMCINOLONE ACETONIDE 1 MG/G
CREAM TOPICAL
Qty: 80 G | Refills: 0 | Status: SHIPPED | OUTPATIENT
Start: 2023-09-28

## 2023-09-28 NOTE — PROGRESS NOTES
Subjective:      Patient ID:  Bee Condon is a 38 y.o. female who presents for No chief complaint on file.    HPI    Review of Systems    Objective:   Physical Exam     Diagram Legend     Erythematous scaling macule/papule c/w actinic keratosis       Vascular papule c/w angioma      Pigmented verrucoid papule/plaque c/w seborrheic keratosis      Yellow umbilicated papule c/w sebaceous hyperplasia      Irregularly shaped tan macule c/w lentigo     1-2 mm smooth white papules consistent with Milia      Movable subcutaneous cyst with punctum c/w epidermal inclusion cyst      Subcutaneous movable cyst c/w pilar cyst      Firm pink to brown papule c/w dermatofibroma      Pedunculated fleshy papule(s) c/w skin tag(s)      Evenly pigmented macule c/w junctional nevus     Mildly variegated pigmented, slightly irregular-bordered macule c/w mildly atypical nevus      Flesh colored to evenly pigmented papule c/w intradermal nevus       Pink pearly papule/plaque c/w basal cell carcinoma      Erythematous hyperkeratotic cursted plaque c/w SCC      Surgical scar with no sign of skin cancer recurrence      Open and closed comedones      Inflammatory papules and pustules      Verrucoid papule consistent consistent with wart     Erythematous eczematous patches and plaques     Dystrophic onycholytic nail with subungual debris c/w onychomycosis     Umbilicated papule    Erythematous-base heme-crusted tan verrucoid plaque consistent with inflamed seborrheic keratosis     Erythematous Silvery Scaling Plaque c/w Psoriasis     See annotation      Assessment / Plan:        There are no diagnoses linked to this encounter.         No follow-ups on file.

## 2023-10-06 ENCOUNTER — PATIENT MESSAGE (OUTPATIENT)
Dept: RHEUMATOLOGY | Facility: CLINIC | Age: 38
End: 2023-10-06
Payer: COMMERCIAL

## 2023-10-11 ENCOUNTER — PATIENT MESSAGE (OUTPATIENT)
Dept: GASTROENTEROLOGY | Facility: CLINIC | Age: 38
End: 2023-10-11
Payer: COMMERCIAL

## 2023-10-11 RX ORDER — PANTOPRAZOLE SODIUM 20 MG/1
20 TABLET, DELAYED RELEASE ORAL DAILY
Qty: 30 TABLET | Refills: 5 | Status: SHIPPED | OUTPATIENT
Start: 2023-10-11 | End: 2024-10-10

## 2023-11-09 ENCOUNTER — LAB VISIT (OUTPATIENT)
Dept: LAB | Facility: HOSPITAL | Age: 38
End: 2023-11-09
Attending: PHYSICIAN ASSISTANT
Payer: COMMERCIAL

## 2023-11-09 DIAGNOSIS — L40.59 POLYARTICULAR PSORIATIC ARTHRITIS: ICD-10-CM

## 2023-11-09 DIAGNOSIS — D84.9 IMMUNOCOMPROMISED: ICD-10-CM

## 2023-11-09 DIAGNOSIS — Z51.81 MEDICATION MONITORING ENCOUNTER: ICD-10-CM

## 2023-11-09 LAB
ALBUMIN SERPL BCP-MCNC: 3.5 G/DL (ref 3.5–5.2)
ALP SERPL-CCNC: 86 U/L (ref 55–135)
ALT SERPL W/O P-5'-P-CCNC: 15 U/L (ref 10–44)
ANION GAP SERPL CALC-SCNC: 9 MMOL/L (ref 8–16)
AST SERPL-CCNC: 15 U/L (ref 10–40)
BASOPHILS # BLD AUTO: 0.03 K/UL (ref 0–0.2)
BASOPHILS NFR BLD: 0.4 % (ref 0–1.9)
BILIRUB SERPL-MCNC: 0.3 MG/DL (ref 0.1–1)
BUN SERPL-MCNC: 6 MG/DL (ref 6–20)
CALCIUM SERPL-MCNC: 9 MG/DL (ref 8.7–10.5)
CHLORIDE SERPL-SCNC: 103 MMOL/L (ref 95–110)
CO2 SERPL-SCNC: 27 MMOL/L (ref 23–29)
CREAT SERPL-MCNC: 1 MG/DL (ref 0.5–1.4)
CRP SERPL-MCNC: 54.5 MG/L (ref 0–8.2)
DIFFERENTIAL METHOD: ABNORMAL
EOSINOPHIL # BLD AUTO: 0.3 K/UL (ref 0–0.5)
EOSINOPHIL NFR BLD: 3.6 % (ref 0–8)
ERYTHROCYTE [DISTWIDTH] IN BLOOD BY AUTOMATED COUNT: 13.1 % (ref 11.5–14.5)
ERYTHROCYTE [SEDIMENTATION RATE] IN BLOOD BY PHOTOMETRIC METHOD: 40 MM/HR (ref 0–36)
EST. GFR  (NO RACE VARIABLE): >60 ML/MIN/1.73 M^2
GLUCOSE SERPL-MCNC: 134 MG/DL (ref 70–110)
HCT VFR BLD AUTO: 39.1 % (ref 37–48.5)
HGB BLD-MCNC: 12.5 G/DL (ref 12–16)
IMM GRANULOCYTES # BLD AUTO: 0.03 K/UL (ref 0–0.04)
IMM GRANULOCYTES NFR BLD AUTO: 0.4 % (ref 0–0.5)
LYMPHOCYTES # BLD AUTO: 1.8 K/UL (ref 1–4.8)
LYMPHOCYTES NFR BLD: 24 % (ref 18–48)
MCH RBC QN AUTO: 26.8 PG (ref 27–31)
MCHC RBC AUTO-ENTMCNC: 32 G/DL (ref 32–36)
MCV RBC AUTO: 84 FL (ref 82–98)
MONOCYTES # BLD AUTO: 0.4 K/UL (ref 0.3–1)
MONOCYTES NFR BLD: 4.8 % (ref 4–15)
NEUTROPHILS # BLD AUTO: 5.1 K/UL (ref 1.8–7.7)
NEUTROPHILS NFR BLD: 66.8 % (ref 38–73)
NRBC BLD-RTO: 0 /100 WBC
PLATELET # BLD AUTO: 397 K/UL (ref 150–450)
PMV BLD AUTO: 8.7 FL (ref 9.2–12.9)
POTASSIUM SERPL-SCNC: 4.4 MMOL/L (ref 3.5–5.1)
PROT SERPL-MCNC: 7.6 G/DL (ref 6–8.4)
RBC # BLD AUTO: 4.67 M/UL (ref 4–5.4)
SODIUM SERPL-SCNC: 139 MMOL/L (ref 136–145)
WBC # BLD AUTO: 7.57 K/UL (ref 3.9–12.7)

## 2023-11-09 PROCEDURE — 86140 C-REACTIVE PROTEIN: CPT | Performed by: PHYSICIAN ASSISTANT

## 2023-11-09 PROCEDURE — 85025 COMPLETE CBC W/AUTO DIFF WBC: CPT | Performed by: PHYSICIAN ASSISTANT

## 2023-11-09 PROCEDURE — 80053 COMPREHEN METABOLIC PANEL: CPT | Performed by: PHYSICIAN ASSISTANT

## 2023-11-09 PROCEDURE — 36415 COLL VENOUS BLD VENIPUNCTURE: CPT | Performed by: PHYSICIAN ASSISTANT

## 2023-11-09 PROCEDURE — 85652 RBC SED RATE AUTOMATED: CPT | Performed by: PHYSICIAN ASSISTANT

## 2023-11-16 ENCOUNTER — OFFICE VISIT (OUTPATIENT)
Dept: RHEUMATOLOGY | Facility: CLINIC | Age: 38
End: 2023-11-16
Payer: COMMERCIAL

## 2023-11-16 ENCOUNTER — HOSPITAL ENCOUNTER (OUTPATIENT)
Dept: RADIOLOGY | Facility: HOSPITAL | Age: 38
Discharge: HOME | End: 2023-11-16
Attending: PHYSICIAN ASSISTANT
Payer: COMMERCIAL

## 2023-11-16 VITALS
HEART RATE: 84 BPM | WEIGHT: 280.88 LBS | SYSTOLIC BLOOD PRESSURE: 152 MMHG | HEIGHT: 68 IN | BODY MASS INDEX: 42.57 KG/M2 | DIASTOLIC BLOOD PRESSURE: 98 MMHG

## 2023-11-16 DIAGNOSIS — M79.604 RIGHT LEG PAIN: ICD-10-CM

## 2023-11-16 DIAGNOSIS — Z79.899 IMMUNOCOMPROMISED STATE DUE TO DRUG THERAPY: ICD-10-CM

## 2023-11-16 DIAGNOSIS — L40.59 POLYARTICULAR PSORIATIC ARTHRITIS: Primary | ICD-10-CM

## 2023-11-16 DIAGNOSIS — M79.2 NEUROPATHIC PAIN: ICD-10-CM

## 2023-11-16 DIAGNOSIS — Z51.81 MEDICATION MONITORING ENCOUNTER: ICD-10-CM

## 2023-11-16 DIAGNOSIS — Z79.899 HIGH RISK MEDICATION USE: ICD-10-CM

## 2023-11-16 DIAGNOSIS — M79.7 FIBROMYALGIA: ICD-10-CM

## 2023-11-16 DIAGNOSIS — D84.821 IMMUNOCOMPROMISED STATE DUE TO DRUG THERAPY: ICD-10-CM

## 2023-11-16 PROCEDURE — 99215 OFFICE O/P EST HI 40 MIN: CPT | Mod: S$GLB,,, | Performed by: PHYSICIAN ASSISTANT

## 2023-11-16 PROCEDURE — 72114 XR LUMBAR SPINE 5 VIEW WITH FLEX AND EXT: ICD-10-PCS | Mod: 26,,, | Performed by: RADIOLOGY

## 2023-11-16 PROCEDURE — 72114 X-RAY EXAM L-S SPINE BENDING: CPT | Mod: 26,,, | Performed by: RADIOLOGY

## 2023-11-16 PROCEDURE — 99999 PR PBB SHADOW E&M-EST. PATIENT-LVL V: ICD-10-PCS | Mod: PBBFAC,,, | Performed by: PHYSICIAN ASSISTANT

## 2023-11-16 PROCEDURE — 1159F MED LIST DOCD IN RCRD: CPT | Mod: CPTII,S$GLB,, | Performed by: PHYSICIAN ASSISTANT

## 2023-11-16 PROCEDURE — 3077F PR MOST RECENT SYSTOLIC BLOOD PRESSURE >= 140 MM HG: ICD-10-PCS | Mod: CPTII,S$GLB,, | Performed by: PHYSICIAN ASSISTANT

## 2023-11-16 PROCEDURE — 3008F BODY MASS INDEX DOCD: CPT | Mod: CPTII,S$GLB,, | Performed by: PHYSICIAN ASSISTANT

## 2023-11-16 PROCEDURE — 3044F PR MOST RECENT HEMOGLOBIN A1C LEVEL <7.0%: ICD-10-PCS | Mod: CPTII,S$GLB,, | Performed by: PHYSICIAN ASSISTANT

## 2023-11-16 PROCEDURE — 3044F HG A1C LEVEL LT 7.0%: CPT | Mod: CPTII,S$GLB,, | Performed by: PHYSICIAN ASSISTANT

## 2023-11-16 PROCEDURE — 3008F PR BODY MASS INDEX (BMI) DOCUMENTED: ICD-10-PCS | Mod: CPTII,S$GLB,, | Performed by: PHYSICIAN ASSISTANT

## 2023-11-16 PROCEDURE — 3077F SYST BP >= 140 MM HG: CPT | Mod: CPTII,S$GLB,, | Performed by: PHYSICIAN ASSISTANT

## 2023-11-16 PROCEDURE — 3080F DIAST BP >= 90 MM HG: CPT | Mod: CPTII,S$GLB,, | Performed by: PHYSICIAN ASSISTANT

## 2023-11-16 PROCEDURE — 99999 PR PBB SHADOW E&M-EST. PATIENT-LVL V: CPT | Mod: PBBFAC,,, | Performed by: PHYSICIAN ASSISTANT

## 2023-11-16 PROCEDURE — 1160F RVW MEDS BY RX/DR IN RCRD: CPT | Mod: CPTII,S$GLB,, | Performed by: PHYSICIAN ASSISTANT

## 2023-11-16 PROCEDURE — 3080F PR MOST RECENT DIASTOLIC BLOOD PRESSURE >= 90 MM HG: ICD-10-PCS | Mod: CPTII,S$GLB,, | Performed by: PHYSICIAN ASSISTANT

## 2023-11-16 PROCEDURE — 72114 X-RAY EXAM L-S SPINE BENDING: CPT | Mod: TC

## 2023-11-16 PROCEDURE — 99215 PR OFFICE/OUTPT VISIT, EST, LEVL V, 40-54 MIN: ICD-10-PCS | Mod: S$GLB,,, | Performed by: PHYSICIAN ASSISTANT

## 2023-11-16 PROCEDURE — 1159F PR MEDICATION LIST DOCUMENTED IN MEDICAL RECORD: ICD-10-PCS | Mod: CPTII,S$GLB,, | Performed by: PHYSICIAN ASSISTANT

## 2023-11-16 PROCEDURE — 1160F PR REVIEW ALL MEDS BY PRESCRIBER/CLIN PHARMACIST DOCUMENTED: ICD-10-PCS | Mod: CPTII,S$GLB,, | Performed by: PHYSICIAN ASSISTANT

## 2023-11-16 RX ORDER — USTEKINUMAB 45 MG/.5ML
INJECTION, SOLUTION SUBCUTANEOUS
Qty: 2 EACH | Refills: 0 | Status: ACTIVE | OUTPATIENT
Start: 2023-11-16 | End: 2024-03-18

## 2023-11-16 RX ORDER — USTEKINUMAB 45 MG/.5ML
45 INJECTION, SOLUTION SUBCUTANEOUS
Qty: 1 EACH | Refills: 1 | Status: ACTIVE | OUTPATIENT
Start: 2023-11-16 | End: 2024-03-18

## 2023-11-16 RX ORDER — FOLIC ACID 1 MG/1
TABLET ORAL
COMMUNITY
Start: 2023-11-08 | End: 2023-11-16

## 2023-11-16 NOTE — PROGRESS NOTES
Subjective:      Patient ID: Bee Condon is a 38 y.o. female.    Chief Complaint: Psoriatic Arthritis and Disease Management    HPI   Bee Condon  is a 38 y.o. female seen for f/u PsA (w/o PsO) and FM.  Failed MTX, rinvoq and humira.  Started Cosentyx Sept 2023.  C/o severe worsening inflammatory joint pain and am stiffness last 4+ hours.   C/o multi joint pain - knees, SI, Lt achilles, Lt elbow, shoulders and left hand.  No associated weakness.  No Raynaud's.     Patient with history of abdominal pain.  She is on pantoprazole.  They suspect psoriatic arthritis is contributing to the abdominal pain.  GI is hoping for her to get better control of psoriatic arthritis so they can do further workup of her abdominal pain.  She would like to go with 1 that has lower risk of GI side effects.    On low-dose naltrexone for fibromyalgia.  Has been helpful for her.  On 4.5 mg qhs.  Overall she is stable from FM standpoint.    Recently had to deal with serotonin syndrome.  She is under the care of a psychiatrist.  They lowered 1 of her medications and she is back to baseline.  Failed cymbalta in past - she suspects Serotonin syndrome    Also c/o low back pain that radiates down the RLE.  Describes it as burning and shooting.  Progressively worsening.  Wants to avoid steroids as she has s/e from them.    Patient c/o photosensitivity but denies fevers, chills, eye pain, shortness of breath, chest pain, hematuria, blood in the stool, rash, sicca symptoms, raynauds, finger ulcerations.  Rheumatologic systems otherwise negative.    Serologies/Labs:  Low titer SHANNON (speckled) - repeat was neg  Neg RF, CCP, HLA B27  Current Treatment:  Humira - s/e  LDN 4.5 mg  Previous Treatment:   MTX - intolerance - severe fatigue  Humira - s/e - rash and feeling sick for several days  Rinvoq - ineffective  Cymbalta - serotonin syndrome?    Current Outpatient Medications:     azelastine (ASTELIN) 137 mcg (0.1 %) nasal spray, 1-2 SEN BID PRN,  Disp: , Rfl:     cetirizine (ZYRTEC) 10 MG tablet, Take 10 mg by mouth., Disp: , Rfl:     clonazePAM (KLONOPIN) 0.5 MG tablet, Take 0.5 mg by mouth 2 (two) times daily as needed., Disp: , Rfl:     d-mannose 500 mg Cap, Take by mouth., Disp: , Rfl:     estrogens,esterified,-methyltestosterone 1.25-2.5mg (ESTRATEST) per tablet, Take by mouth., Disp: , Rfl:     iron bis-gly/FA/C/B12/Ca/succ (IRON-150 ORAL), Take by mouth., Disp: , Rfl:     levocetirizine (XYZAL) 5 MG tablet, Take 5 mg by mouth every evening., Disp: , Rfl:     lisdexamfetamine (VYVANSE) 30 MG capsule, , Disp: , Rfl:     MAGNESIUM GLYCINATE ORAL, Take by mouth., Disp: , Rfl:     ondansetron (ZOFRAN-ODT) 4 MG TbDL, Take 4 mg by mouth daily as needed., Disp: , Rfl:     pantoprazole (PROTONIX) 20 MG tablet, Take 1 tablet (20 mg total) by mouth once daily., Disp: 30 tablet, Rfl: 5    rizatriptan (MAXALT) 10 MG tablet, Take by mouth., Disp: , Rfl:     triamcinolone acetonide 0.1% (KENALOG) 0.1 % cream, AAA bid prn rash of arms. Moderate steroid., Disp: 80 g, Rfl: 0    VIIBRYD 10 mg Tab tablet, Take 10 mg by mouth once daily., Disp: , Rfl:     natrexone tablet 4.5 mg, Take 6 mg qhs, Disp: 90 tablet, Rfl: 3    ustekinumab (STELARA) 45 mg/0.5 mL Syrg syringe, 45 mg SQ every 28 days x 2 doses then every 12 weeks thereafter, Disp: 2 each, Rfl: 0    ustekinumab (STELARA) 45 mg/0.5 mL Syrg syringe, Inject 0.5 mLs (45 mg total) into the skin every 12 weeks., Disp: 1 each, Rfl: 1    Past Medical History:   Diagnosis Date    Anxiety     Depression     Interstitial cystitis 2019     Family History   Problem Relation Age of Onset    Cancer Mother     Heart disease Father     Diabetes Mellitus Father     Cancer Paternal Aunt     Hyperlipidemia Paternal Aunt      Social History     Socioeconomic History    Marital status:    Tobacco Use    Smoking status: Never    Smokeless tobacco: Never   Substance and Sexual Activity    Alcohol use: Not Currently    Drug use:  "Never    Sexual activity: Yes     Review of patient's allergies indicates:   Allergen Reactions    Iodine     Oxycodone-acetaminophen Hives and Itching    Serotonin 5ht-3 antagonists     Shellfish containing products        Objective:   BP (!) 152/98   Pulse 84   Ht 5' 8" (1.727 m)   Wt 127.4 kg (280 lb 13.9 oz)   BMI 42.71 kg/m²   Immunization History   Administered Date(s) Administered    COVID-19, MRNA, LN-S, PF (MODERNA FULL 0.5 ML DOSE) 02/27/2021, 03/25/2021, 11/08/2021    Influenza (FLUAD) - Quadrivalent - Adjuvanted - PF *Preferred* (65+) 09/19/2023    Influenza - Quadrivalent - PF *Preferred* (6 months and older) 12/14/2015, 10/15/2018, 09/19/2019, 09/17/2020, 11/08/2021, 09/16/2022    Pneumococcal Conjugate - 20 Valent 09/28/2023       Physical Exam   Constitutional: She is oriented to person, place, and time. No distress.   HENT:   Head: Normocephalic and atraumatic.   Pulmonary/Chest: Effort normal.   Musculoskeletal:         General: Swelling and tenderness present.      Cervical back: Normal range of motion.   Neurological: She is alert and oriented to person, place, and time.   Psychiatric: Mood normal.   100% fist b/l    Enthesitis left achilles, lt pat tendon, Rt>Lt SI joint, Lt lat epicondyle, bilat shoulders  No effusions  Preserved ROM      Recent Results (from the past 672 hour(s))   CBC Auto Differential    Collection Time: 11/09/23  9:40 AM   Result Value Ref Range    WBC 7.57 3.90 - 12.70 K/uL    RBC 4.67 4.00 - 5.40 M/uL    Hemoglobin 12.5 12.0 - 16.0 g/dL    Hematocrit 39.1 37.0 - 48.5 %    MCV 84 82 - 98 fL    MCH 26.8 (L) 27.0 - 31.0 pg    MCHC 32.0 32.0 - 36.0 g/dL    RDW 13.1 11.5 - 14.5 %    Platelets 397 150 - 450 K/uL    MPV 8.7 (L) 9.2 - 12.9 fL    Immature Granulocytes 0.4 0.0 - 0.5 %    Gran # (ANC) 5.1 1.8 - 7.7 K/uL    Immature Grans (Abs) 0.03 0.00 - 0.04 K/uL    Lymph # 1.8 1.0 - 4.8 K/uL    Mono # 0.4 0.3 - 1.0 K/uL    Eos # 0.3 0.0 - 0.5 K/uL    Baso # 0.03 0.00 - 0.20 " K/uL    nRBC 0 0 /100 WBC    Gran % 66.8 38.0 - 73.0 %    Lymph % 24.0 18.0 - 48.0 %    Mono % 4.8 4.0 - 15.0 %    Eosinophil % 3.6 0.0 - 8.0 %    Basophil % 0.4 0.0 - 1.9 %    Differential Method Automated    Comprehensive Metabolic Panel    Collection Time: 11/09/23  9:40 AM   Result Value Ref Range    Sodium 139 136 - 145 mmol/L    Potassium 4.4 3.5 - 5.1 mmol/L    Chloride 103 95 - 110 mmol/L    CO2 27 23 - 29 mmol/L    Glucose 134 (H) 70 - 110 mg/dL    BUN 6 6 - 20 mg/dL    Creatinine 1.0 0.5 - 1.4 mg/dL    Calcium 9.0 8.7 - 10.5 mg/dL    Total Protein 7.6 6.0 - 8.4 g/dL    Albumin 3.5 3.5 - 5.2 g/dL    Total Bilirubin 0.3 0.1 - 1.0 mg/dL    Alkaline Phosphatase 86 55 - 135 U/L    AST 15 10 - 40 U/L    ALT 15 10 - 44 U/L    eGFR >60 >60 mL/min/1.73 m^2    Anion Gap 9 8 - 16 mmol/L   Sedimentation rate    Collection Time: 11/09/23  9:40 AM   Result Value Ref Range    Sed Rate 40 (H) 0 - 36 mm/Hr   C-Reactive Protein    Collection Time: 11/09/23  9:40 AM   Result Value Ref Range    CRP 54.5 (H) 0.0 - 8.2 mg/L     Recent Results (from the past 1008 hour(s))   CBC Auto Differential    Collection Time: 11/09/23  9:40 AM   Result Value Ref Range    WBC 7.57 3.90 - 12.70 K/uL    RBC 4.67 4.00 - 5.40 M/uL    Hemoglobin 12.5 12.0 - 16.0 g/dL    Hematocrit 39.1 37.0 - 48.5 %    MCV 84 82 - 98 fL    MCH 26.8 (L) 27.0 - 31.0 pg    MCHC 32.0 32.0 - 36.0 g/dL    RDW 13.1 11.5 - 14.5 %    Platelets 397 150 - 450 K/uL    MPV 8.7 (L) 9.2 - 12.9 fL    Immature Granulocytes 0.4 0.0 - 0.5 %    Gran # (ANC) 5.1 1.8 - 7.7 K/uL    Immature Grans (Abs) 0.03 0.00 - 0.04 K/uL    Lymph # 1.8 1.0 - 4.8 K/uL    Mono # 0.4 0.3 - 1.0 K/uL    Eos # 0.3 0.0 - 0.5 K/uL    Baso # 0.03 0.00 - 0.20 K/uL    nRBC 0 0 /100 WBC    Gran % 66.8 38.0 - 73.0 %    Lymph % 24.0 18.0 - 48.0 %    Mono % 4.8 4.0 - 15.0 %    Eosinophil % 3.6 0.0 - 8.0 %    Basophil % 0.4 0.0 - 1.9 %    Differential Method Automated    Comprehensive Metabolic Panel    Collection  Time: 11/09/23  9:40 AM   Result Value Ref Range    Sodium 139 136 - 145 mmol/L    Potassium 4.4 3.5 - 5.1 mmol/L    Chloride 103 95 - 110 mmol/L    CO2 27 23 - 29 mmol/L    Glucose 134 (H) 70 - 110 mg/dL    BUN 6 6 - 20 mg/dL    Creatinine 1.0 0.5 - 1.4 mg/dL    Calcium 9.0 8.7 - 10.5 mg/dL    Total Protein 7.6 6.0 - 8.4 g/dL    Albumin 3.5 3.5 - 5.2 g/dL    Total Bilirubin 0.3 0.1 - 1.0 mg/dL    Alkaline Phosphatase 86 55 - 135 U/L    AST 15 10 - 40 U/L    ALT 15 10 - 44 U/L    eGFR >60 >60 mL/min/1.73 m^2    Anion Gap 9 8 - 16 mmol/L   Sedimentation rate    Collection Time: 11/09/23  9:40 AM   Result Value Ref Range    Sed Rate 40 (H) 0 - 36 mm/Hr   C-Reactive Protein    Collection Time: 11/09/23  9:40 AM   Result Value Ref Range    CRP 54.5 (H) 0.0 - 8.2 mg/L          Lab Results   Component Value Date    TBGOLDPLUS Negative 09/19/2023      Lab Results   Component Value Date    HEPAIGM Non-reactive 09/19/2023    HEPBIGM Non-reactive 09/19/2023    HEPBCAB Negative 05/24/2022    HEPCAB Non-reactive 09/19/2023        Imaging  I have personally reviewed images and reports as below.  I agree with the interpretation.  X-Ray Foot Complete Bilateral  Order: [id number]  Status: Final result     Visible to patient: Yes (seen)     Next appt: 06/14/2023 at 09:30 AM in Gastroenterology (Nohelia Grissom PA-C)     Dx: Heel pain, bilateral     0 Result Notes  Details    Reading Physician Reading Date Result Priority   Yogi Stevenson MD  [phone] 10/26/2022 Routine     Narrative & Impression  EXAMINATION:  XR FOOT COMPLETE 3 VIEW BILATERAL     CLINICAL HISTORY:  Pain in right foot     TECHNIQUE:  AP, lateral, and oblique views of both feet were performed.     COMPARISON:  None     FINDINGS:  No acute osseous abnormality.  Bilateral mild hallux valgus deformity with 1st metatarsal median eminence prominence.  Minimal early bilateral calcaneal enthesophyte changes.  Soft tissues unremarkable.     Impression:     As  above        Electronically signed by: Yogi Stevenson MD  Date:                                            10/26/2022  Time:                                           10:35       Assessment:     1. Polyarticular psoriatic arthritis    2. Medication monitoring encounter    3. High risk medication use    4. Immunocompromised state due to drug therapy    5. Right leg pain    6. Neuropathic pain    7. Fibromyalgia          Plan:     Bee was seen today for psoriatic arthritis and disease management.    Diagnoses and all orders for this visit:    Polyarticular psoriatic arthritis  -     ustekinumab (STELARA) 45 mg/0.5 mL Syrg syringe; 45 mg SQ every 28 days x 2 doses then every 12 weeks thereafter  -     ustekinumab (STELARA) 45 mg/0.5 mL Syrg syringe; Inject 0.5 mLs (45 mg total) into the skin every 12 weeks.    Medication monitoring encounter    High risk medication use    Immunocompromised state due to drug therapy    Right leg pain  -     EMG W/ ULTRASOUND AND NERVE CONDUCTION TEST 2 Extremities; Future  -     X-Ray Lumbar Complete Including Flex And Ext; Future    Neuropathic pain  -     EMG W/ ULTRASOUND AND NERVE CONDUCTION TEST 2 Extremities; Future  -     X-Ray Lumbar Complete Including Flex And Ext; Future    Fibromyalgia  -     natrexone tablet 4.5 mg; Take 6 mg qhs    Severe worsening PsA   Worsening joint pain/swelling and stiffness  Labs reviewed  CBC/CMP stable  ESR/CRP significantly elevated  DC cosentyx  Start Stelara 45 mg as above - PsA dosing  Discussed risks/benefits  Literature provided  Rx sent to [facility] for auth  TB and hep serologies updated 9/2023  FM  C/w LDN  Tolerating it well  Consider addition of gabapentin  LBP w radiating symptoms down the RLE  Xray l spine today  EMG referral placed  Consider IPM ref pending above.  Abd pain  F/u w GI  C/w PPI  Drug therapy requiring intensive monitoring for toxicity  High Risk Medication Monitoring encounter  No current medication related issues, no  evidence of toxicity  I ordered labs for toxicity monitoring, have personally reviewed the findings, and discussed them with the patient.  Pending labs will be sent via the portal  Compromised immune system secondary to autoimmune disease and/or use of immunosuppressive drugs.  Monitor carefully for infections.  Advised patient to get immediate medical care if any infection arises.  Also advised strict adherence age-appropriate vaccinations and cancer screenings with PCP.  Patient advised to hold DMARD and/or biologic therapy for signs of infection or for surgery. If you are unsure what to do please call our office for instruction.[facility] [phone]  Return to clinic: 3-4 mos, reg 4 prior    Follow up in about 4 months (around 3/16/2024).    The patient understands, chooses and consents to this plan and accepts all the risks which include but are not limited to the risks mentioned above.     Disclaimer: This note was prepared using a voice recognition system and is likely to have sound alike errors within the text.

## 2023-11-18 ENCOUNTER — PATIENT MESSAGE (OUTPATIENT)
Dept: RHEUMATOLOGY | Facility: CLINIC | Age: 38
End: 2023-11-18
Payer: COMMERCIAL

## 2023-11-20 ENCOUNTER — TELEPHONE (OUTPATIENT)
Dept: DERMATOLOGY | Facility: CLINIC | Age: 38
End: 2023-11-20
Payer: COMMERCIAL

## 2023-11-20 NOTE — TELEPHONE ENCOUNTER
Returned call to patient.  Patient phone was not working correctly.  Patient could no hear.  Will try to call patient back ----- Message from Venessa Montgomery sent at 11/20/2023  3:12 PM CST -----  Contact: [phone]  Type:  Patient Returning Call    Who Called:Bee   Who Left Message for Patient:nurse   Does the patient know what this is regarding?:yes   Would the patient rather a call back or a response via [facility]? Call back   Best Call Back Number:[phone]  Additional Information: n/a      Thanks KB

## 2023-11-21 ENCOUNTER — TELEPHONE (OUTPATIENT)
Dept: PHYSICAL MEDICINE AND REHAB | Facility: CLINIC | Age: 38
End: 2023-11-21
Payer: COMMERCIAL

## 2023-11-21 NOTE — TELEPHONE ENCOUNTER
----- Message from Keyla Chung MA sent at 11/20/2023  4:25 PM CST -----    ----- Message -----  From: Jamilah Mccullough  Sent: 11/20/2023   4:21 PM CST  To: [facility] Staff    Type: Patient Call Back    Who called:pt     What is the request in detail:pt calling to schedule emg appt. Call pt     Can the clinic reply by [facility]?    Would the patient rather a call back or a response via [facility]? call    Best call back number:[phone] (home)       Additional Information:

## 2023-11-24 ENCOUNTER — PATIENT MESSAGE (OUTPATIENT)
Dept: RHEUMATOLOGY | Facility: CLINIC | Age: 38
End: 2023-11-24
Payer: COMMERCIAL

## 2023-11-27 ENCOUNTER — OFFICE VISIT (OUTPATIENT)
Dept: GASTROENTEROLOGY | Facility: CLINIC | Age: 38
End: 2023-11-27
Payer: COMMERCIAL

## 2023-11-27 VITALS
HEIGHT: 68 IN | SYSTOLIC BLOOD PRESSURE: 139 MMHG | HEART RATE: 102 BPM | BODY MASS INDEX: 42.57 KG/M2 | DIASTOLIC BLOOD PRESSURE: 88 MMHG | WEIGHT: 280.88 LBS

## 2023-11-27 DIAGNOSIS — R13.10 DYSPHAGIA, UNSPECIFIED TYPE: ICD-10-CM

## 2023-11-27 DIAGNOSIS — R10.13 EPIGASTRIC PAIN: Primary | ICD-10-CM

## 2023-11-27 DIAGNOSIS — R11.0 NAUSEA: ICD-10-CM

## 2023-11-27 PROCEDURE — 99214 PR OFFICE/OUTPT VISIT, EST, LEVL IV, 30-39 MIN: ICD-10-PCS | Mod: S$GLB,,, | Performed by: NURSE PRACTITIONER

## 2023-11-27 PROCEDURE — 3079F PR MOST RECENT DIASTOLIC BLOOD PRESSURE 80-89 MM HG: ICD-10-PCS | Mod: CPTII,S$GLB,, | Performed by: NURSE PRACTITIONER

## 2023-11-27 PROCEDURE — 1159F MED LIST DOCD IN RCRD: CPT | Mod: CPTII,S$GLB,, | Performed by: NURSE PRACTITIONER

## 2023-11-27 PROCEDURE — 1160F RVW MEDS BY RX/DR IN RCRD: CPT | Mod: CPTII,S$GLB,, | Performed by: NURSE PRACTITIONER

## 2023-11-27 PROCEDURE — 99214 OFFICE O/P EST MOD 30 MIN: CPT | Mod: S$GLB,,, | Performed by: NURSE PRACTITIONER

## 2023-11-27 PROCEDURE — 99999 PR PBB SHADOW E&M-EST. PATIENT-LVL V: CPT | Mod: PBBFAC,,, | Performed by: NURSE PRACTITIONER

## 2023-11-27 PROCEDURE — 1160F PR REVIEW ALL MEDS BY PRESCRIBER/CLIN PHARMACIST DOCUMENTED: ICD-10-PCS | Mod: CPTII,S$GLB,, | Performed by: NURSE PRACTITIONER

## 2023-11-27 PROCEDURE — 3079F DIAST BP 80-89 MM HG: CPT | Mod: CPTII,S$GLB,, | Performed by: NURSE PRACTITIONER

## 2023-11-27 PROCEDURE — 3044F PR MOST RECENT HEMOGLOBIN A1C LEVEL <7.0%: ICD-10-PCS | Mod: CPTII,S$GLB,, | Performed by: NURSE PRACTITIONER

## 2023-11-27 PROCEDURE — 3044F HG A1C LEVEL LT 7.0%: CPT | Mod: CPTII,S$GLB,, | Performed by: NURSE PRACTITIONER

## 2023-11-27 PROCEDURE — 3008F PR BODY MASS INDEX (BMI) DOCUMENTED: ICD-10-PCS | Mod: CPTII,S$GLB,, | Performed by: NURSE PRACTITIONER

## 2023-11-27 PROCEDURE — 3075F PR MOST RECENT SYSTOLIC BLOOD PRESS GE 130-139MM HG: ICD-10-PCS | Mod: CPTII,S$GLB,, | Performed by: NURSE PRACTITIONER

## 2023-11-27 PROCEDURE — 3075F SYST BP GE 130 - 139MM HG: CPT | Mod: CPTII,S$GLB,, | Performed by: NURSE PRACTITIONER

## 2023-11-27 PROCEDURE — 99999 PR PBB SHADOW E&M-EST. PATIENT-LVL V: ICD-10-PCS | Mod: PBBFAC,,, | Performed by: NURSE PRACTITIONER

## 2023-11-27 PROCEDURE — 3008F BODY MASS INDEX DOCD: CPT | Mod: CPTII,S$GLB,, | Performed by: NURSE PRACTITIONER

## 2023-11-27 PROCEDURE — 1159F PR MEDICATION LIST DOCUMENTED IN MEDICAL RECORD: ICD-10-PCS | Mod: CPTII,S$GLB,, | Performed by: NURSE PRACTITIONER

## 2023-11-27 NOTE — PROGRESS NOTES
Clinic Follow Up:  [facility] Follow Up Note    Reason for Follow Up:  The primary encounter diagnosis was Epigastric pain. Diagnoses of Dysphagia, unspecified type and Nausea were also pertinent to this visit.    PCP: Germania Hernandez       HPI:  This is a 38 y.o. female here for follow up of abdominal pain.   Location of pain is epigastric. Pain is constant but worse with eating. She has been on pantoprazole 20 mg daily.   She does report pills getting stuck in her throat. Does ok with solids and liquids. She is nauseated most mornings.      She uses lemon juice, apple cider vinegar, honey.     She usually eats twice a day with her last meal around 5:00 pm.  She will have a snack before bed that includes oranges, granola bars, cheese and crackers.     No NSAID use.    Review of Systems   Constitutional:  Negative for activity change and appetite change.        As per interval history above   Respiratory:  Negative for cough and shortness of breath.    Cardiovascular:  Negative for chest pain.   Gastrointestinal:  Positive for abdominal pain and nausea. Negative for blood in stool, constipation, diarrhea, rectal pain and vomiting.   Skin:  Negative for color change and rash.       Medical History:  Past Medical History:   Diagnosis Date    Anxiety     Depression     Interstitial cystitis 2019       Surgical History:   Past Surgical History:   Procedure Laterality Date     SECTION      TUBAL LIGATION         Family History:   Family History   Problem Relation Age of Onset    Cancer Mother     Heart disease Father     Diabetes Mellitus Father     Cancer Paternal Aunt     Hyperlipidemia Paternal Aunt        Social History:   Social History     Tobacco Use    Smoking status: Never    Smokeless tobacco: Never   Substance Use Topics    Alcohol use: Not Currently    Drug use: Never       Allergies:   Review of patient's allergies indicates:   Allergen Reactions    Iodine      "Oxycodone-acetaminophen Hives and Itching    Serotonin 5ht-3 antagonists     Shellfish containing products        Home Medications:  Current Outpatient Medications on File Prior to Visit   Medication Sig Dispense Refill    azelastine (ASTELIN) 137 mcg (0.1 %) nasal spray 1-2 SEN BID PRN      cetirizine (ZYRTEC) 10 MG tablet Take 10 mg by mouth.      clonazePAM (KLONOPIN) 0.5 MG tablet Take 0.5 mg by mouth 2 (two) times daily as needed.      d-mannose 500 mg Cap Take by mouth.      estrogens,esterified,-methyltestosterone 1.25-2.5mg (ESTRATEST) per tablet Take by mouth.      levocetirizine (XYZAL) 5 MG tablet Take 5 mg by mouth every evening.      lisdexamfetamine (VYVANSE) 30 MG capsule       MAGNESIUM GLYCINATE ORAL Take by mouth.      natrexone tablet 4.5 mg Take 6 mg qhs 90 tablet 3    ondansetron (ZOFRAN-ODT) 4 MG TbDL Take 4 mg by mouth daily as needed.      pantoprazole (PROTONIX) 20 MG tablet Take 1 tablet (20 mg total) by mouth once daily. 30 tablet 5    rizatriptan (MAXALT) 10 MG tablet Take by mouth.      triamcinolone acetonide 0.1% (KENALOG) 0.1 % cream AAA bid prn rash of arms. Moderate steroid. 80 g 0    ustekinumab (STELARA) 45 mg/0.5 mL Syrg syringe 45 mg SQ every 28 days x 2 doses then every 12 weeks thereafter 2 each 0    ustekinumab (STELARA) 45 mg/0.5 mL Syrg syringe Inject 0.5 mLs (45 mg total) into the skin every 12 weeks. 1 each 1    VIIBRYD 10 mg Tab tablet Take 10 mg by mouth once daily.      iron bis-gly/FA/C/B12/Ca/succ (IRON-150 ORAL) Take by mouth.       No current facility-administered medications on file prior to visit.       /88 (BP Location: Left arm, Patient Position: Sitting, BP Method: Large (Automatic))   Pulse 102   Ht 5' 8" (1.727 m)   Wt 127.4 kg (280 lb 13.9 oz)   BMI 42.71 kg/m²   Body mass index is 42.71 kg/m².  Physical Exam  Constitutional:       General: She is not in acute distress.  HENT:      Head: Normocephalic.   Neurological:      General: No focal " deficit present.      Mental Status: She is alert.   Psychiatric:         Mood and Affect: Mood normal.         Judgment: Judgment normal.         Labs: Pertinent labs reviewed.  CRC Screening:     Assessment:   1. Epigastric pain    2. Dysphagia, unspecified type    3. Nausea        Recommendations:   - continue PPI  - EGD  - A referral has been placed for endoscopy procedure scheduling and pre-admission testing (PAT) appointment has been scheduled.      Epigastric pain  -     Ambulatory referral/consult to Endo Procedure ; Future; Expected date: 11/28/2023  -     Case Request Endoscopy: ESOPHAGOGASTRODUODENOSCOPY (EGD)    Dysphagia, unspecified type  -     Ambulatory referral/consult to Endo Procedure ; Future; Expected date: 11/28/2023  -     Case Request Endoscopy: ESOPHAGOGASTRODUODENOSCOPY (EGD)    Nausea        Return to Clinic:  No follow-ups on file.    Thank you for the opportunity to participate in the care of this patient.  BREANNA Aguilar

## 2023-11-28 ENCOUNTER — HOSPITAL ENCOUNTER (OUTPATIENT)
Dept: PREADMISSION TESTING | Facility: HOSPITAL | Age: 38
Discharge: HOME | End: 2023-11-28
Attending: COLON & RECTAL SURGERY
Payer: COMMERCIAL

## 2023-11-28 DIAGNOSIS — R10.13 EPIGASTRIC PAIN: ICD-10-CM

## 2023-11-28 DIAGNOSIS — R13.10 DYSPHAGIA, UNSPECIFIED TYPE: ICD-10-CM

## 2023-12-28 ENCOUNTER — PATIENT MESSAGE (OUTPATIENT)
Dept: RHEUMATOLOGY | Facility: CLINIC | Age: 38
End: 2023-12-28
Payer: COMMERCIAL

## 2023-12-28 ENCOUNTER — TELEPHONE (OUTPATIENT)
Dept: PHYSICAL MEDICINE AND REHAB | Facility: CLINIC | Age: 38
End: 2023-12-28
Payer: COMMERCIAL

## 2023-12-28 NOTE — TELEPHONE ENCOUNTER
----- Message from Eun Cruz sent at 12/28/2023 12:21 PM CST -----  Contact: Bee Gamboa called in to say that the office has an referral to schedule her for nerve mapping and she is calling to schedule, Please call her at  [phone].    Thanks  Td

## 2024-01-11 ENCOUNTER — OFFICE VISIT (OUTPATIENT)
Dept: GASTROENTEROLOGY | Facility: CLINIC | Age: 39
End: 2024-01-11
Payer: COMMERCIAL

## 2024-01-11 ENCOUNTER — TELEPHONE (OUTPATIENT)
Dept: PREADMISSION TESTING | Facility: HOSPITAL | Age: 39
End: 2024-01-11
Payer: COMMERCIAL

## 2024-01-11 DIAGNOSIS — R10.13 EPIGASTRIC PAIN: Primary | ICD-10-CM

## 2024-01-11 PROCEDURE — 1160F RVW MEDS BY RX/DR IN RCRD: CPT | Mod: CPTII,95,, | Performed by: NURSE PRACTITIONER

## 2024-01-11 PROCEDURE — 1159F MED LIST DOCD IN RCRD: CPT | Mod: CPTII,95,, | Performed by: NURSE PRACTITIONER

## 2024-01-11 PROCEDURE — 3044F HG A1C LEVEL LT 7.0%: CPT | Mod: CPTII,95,, | Performed by: NURSE PRACTITIONER

## 2024-01-11 PROCEDURE — 99213 OFFICE O/P EST LOW 20 MIN: CPT | Mod: 95,,, | Performed by: NURSE PRACTITIONER

## 2024-01-11 NOTE — TELEPHONE ENCOUNTER
Patient states due to financial reasons she wants to cancel the EGD that is scheduled, pt had an appt with JOSÉ MIGUEL Rodriguez today and was told that she could hold off on the procedure for right now and address it when she goes for follow up in the next couple of months. Procedure cancelled. Pt v/u.

## 2024-01-11 NOTE — TELEPHONE ENCOUNTER
----- Message from Luis Enrique Roland MA sent at 1/11/2024 11:29 AM CST -----  Per Np Latrice Rodriguez please cancel patient's schedule EGD

## 2024-01-11 NOTE — PROGRESS NOTES
Clinic Follow Up:  [facility] Follow Up Note    Reason for Follow Up:  The encounter diagnosis was Epigastric pain.    PCP: Germania Hernandez       The patient location is: [place]   The chief complaint leading to consultation is: GERD    Visit type: audiovisual    Face to Face time with patient: 10 minutes   15 minutes of total time spent on the encounter, which includes face to face time and non-face to face time preparing to see the patient (eg, review of tests), Obtaining and/or reviewing separately obtained history, Documenting clinical information in the electronic or other health record, Independently interpreting results (not separately reported) and communicating results to the patient/family/caregiver, or Care coordination (not separately reported).         Each patient to whom he or she provides medical services by telemedicine is:  (1) informed of the relationship between the physician and patient and the respective role of any other health care provider with respect to management of the patient; and (2) notified that he or she may decline to receive medical services by telemedicine and may withdraw from such care at any time.    Notes:         HPI:  This is a 38 y.o. female here for follow up of the above.   She was started on pantoprazole 20 mg daily. All symptoms have improved with medication. She is scheduled for EGD later this month. She was told she will have to come out of pocket a couple thousand dollars for the procedure.     Review of Systems   Constitutional:  Negative for activity change and appetite change.        As per interval history above   Respiratory:  Negative for cough and shortness of breath.    Cardiovascular:  Negative for chest pain.   Gastrointestinal:  Negative for abdominal pain, blood in stool, constipation, diarrhea, nausea and vomiting.   Skin:  Negative for color change and rash.       Medical History:  Past Medical History:   Diagnosis Date     Anxiety     Depression     Interstitial cystitis        Surgical History:   Past Surgical History:   Procedure Laterality Date     SECTION      TUBAL LIGATION         Family History:   Family History   Problem Relation Age of Onset    Cancer Mother     Heart disease Father     Diabetes Mellitus Father     Cancer Paternal Aunt     Hyperlipidemia Paternal Aunt        Social History:   Social History     Tobacco Use    Smoking status: Never    Smokeless tobacco: Never   Substance Use Topics    Alcohol use: Not Currently    Drug use: Never       Allergies:   Review of patient's allergies indicates:   Allergen Reactions    Iodine     Oxycodone-acetaminophen Hives and Itching    Serotonin 5ht-3 antagonists     Shellfish containing products        Home Medications:  Current Outpatient Medications on File Prior to Visit   Medication Sig Dispense Refill    azelastine (ASTELIN) 137 mcg (0.1 %) nasal spray 1-2 SEN BID PRN      cetirizine (ZYRTEC) 10 MG tablet Take 10 mg by mouth.      clonazePAM (KLONOPIN) 0.5 MG tablet Take 0.5 mg by mouth 2 (two) times daily as needed.      d-mannose 500 mg Cap Take by mouth.      estrogens,esterified,-methyltestosterone 1.25-2.5mg (ESTRATEST) per tablet Take by mouth.      iron bis-gly/FA/C/B12/Ca/succ (IRON-150 ORAL) Take by mouth.      levocetirizine (XYZAL) 5 MG tablet Take 5 mg by mouth every evening.      lisdexamfetamine (VYVANSE) 30 MG capsule       MAGNESIUM GLYCINATE ORAL Take by mouth.      natrexone tablet 4.5 mg Take 6 mg qhs 90 tablet 3    ondansetron (ZOFRAN-ODT) 4 MG TbDL Take 4 mg by mouth daily as needed.      pantoprazole (PROTONIX) 20 MG tablet Take 1 tablet (20 mg total) by mouth once daily. 30 tablet 5    rizatriptan (MAXALT) 10 MG tablet Take by mouth.      triamcinolone acetonide 0.1% (KENALOG) 0.1 % cream AAA bid prn rash of arms. Moderate steroid. 80 g 0    ustekinumab (STELARA) 45 mg/0.5 mL Syrg syringe 45 mg SQ every 28 days x 2 doses then every 12  weeks thereafter 2 each 0    ustekinumab (STELARA) 45 mg/0.5 mL Syrg syringe Inject 0.5 mLs (45 mg total) into the skin every 12 weeks. 1 each 1    VIIBRYD 10 mg Tab tablet Take 10 mg by mouth once daily.       No current facility-administered medications on file prior to visit.       There were no vitals taken for this visit.  There is no height or weight on file to calculate BMI.  Physical Exam  Constitutional:       General: She is not in acute distress.  HENT:      Head: Normocephalic.   Neurological:      General: No focal deficit present.      Mental Status: She is alert.   Psychiatric:         Mood and Affect: Mood normal.         Judgment: Judgment normal.         Labs: Pertinent labs reviewed.  CRC Screening: NA    Assessment:   1. Epigastric pain      Symptoms resolved with low dose PPI  Does have large financial responsibility for EGD    Recommendations:   - she should continue pantoprazole. Since symptoms improved, EGD can be canceled.     Epigastric pain    Return to Clinic:  Follow up if symptoms worsen or fail to improve.    Thank you for the opportunity to participate in the care of this patient.  BREANNA Aguilar

## 2024-02-29 ENCOUNTER — PATIENT MESSAGE (OUTPATIENT)
Dept: RHEUMATOLOGY | Facility: CLINIC | Age: 39
End: 2024-02-29
Payer: COMMERCIAL

## 2024-02-29 DIAGNOSIS — L40.59 POLYARTICULAR PSORIATIC ARTHRITIS: Primary | ICD-10-CM

## 2024-02-29 RX ORDER — PREDNISONE 5 MG/1
TABLET ORAL
Qty: 30 TABLET | Refills: 0 | Status: SHIPPED | OUTPATIENT
Start: 2024-02-29 | End: 2024-03-12

## 2024-03-03 ENCOUNTER — PATIENT MESSAGE (OUTPATIENT)
Dept: RHEUMATOLOGY | Facility: CLINIC | Age: 39
End: 2024-03-03
Payer: COMMERCIAL

## 2024-03-11 ENCOUNTER — LAB VISIT (OUTPATIENT)
Dept: LAB | Facility: HOSPITAL | Age: 39
End: 2024-03-11
Attending: INTERNAL MEDICINE
Payer: COMMERCIAL

## 2024-03-11 DIAGNOSIS — Z51.81 MEDICATION MONITORING ENCOUNTER: ICD-10-CM

## 2024-03-11 DIAGNOSIS — L40.59 POLYARTICULAR PSORIATIC ARTHRITIS: ICD-10-CM

## 2024-03-11 DIAGNOSIS — D84.9 IMMUNOCOMPROMISED: ICD-10-CM

## 2024-03-11 LAB
ALBUMIN SERPL BCP-MCNC: 3.7 G/DL (ref 3.5–5.2)
ALP SERPL-CCNC: 85 U/L (ref 55–135)
ALT SERPL W/O P-5'-P-CCNC: 18 U/L (ref 10–44)
ANION GAP SERPL CALC-SCNC: 7 MMOL/L (ref 8–16)
AST SERPL-CCNC: 17 U/L (ref 10–40)
BASOPHILS # BLD AUTO: 0.04 K/UL (ref 0–0.2)
BASOPHILS NFR BLD: 0.7 % (ref 0–1.9)
BILIRUB SERPL-MCNC: 0.4 MG/DL (ref 0.1–1)
BUN SERPL-MCNC: 8 MG/DL (ref 6–20)
CALCIUM SERPL-MCNC: 9.1 MG/DL (ref 8.7–10.5)
CHLORIDE SERPL-SCNC: 101 MMOL/L (ref 95–110)
CO2 SERPL-SCNC: 30 MMOL/L (ref 23–29)
CREAT SERPL-MCNC: 1 MG/DL (ref 0.5–1.4)
CRP SERPL-MCNC: 50.1 MG/L (ref 0–8.2)
DIFFERENTIAL METHOD BLD: ABNORMAL
EOSINOPHIL # BLD AUTO: 0.3 K/UL (ref 0–0.5)
EOSINOPHIL NFR BLD: 5.1 % (ref 0–8)
ERYTHROCYTE [DISTWIDTH] IN BLOOD BY AUTOMATED COUNT: 13.6 % (ref 11.5–14.5)
ERYTHROCYTE [SEDIMENTATION RATE] IN BLOOD BY PHOTOMETRIC METHOD: 29 MM/HR (ref 0–36)
EST. GFR  (NO RACE VARIABLE): >60 ML/MIN/1.73 M^2
GLUCOSE SERPL-MCNC: 98 MG/DL (ref 70–110)
HCT VFR BLD AUTO: 41.5 % (ref 37–48.5)
HGB BLD-MCNC: 13.5 G/DL (ref 12–16)
IMM GRANULOCYTES # BLD AUTO: 0.02 K/UL (ref 0–0.04)
IMM GRANULOCYTES NFR BLD AUTO: 0.3 % (ref 0–0.5)
LYMPHOCYTES # BLD AUTO: 1.6 K/UL (ref 1–4.8)
LYMPHOCYTES NFR BLD: 27.3 % (ref 18–48)
MCH RBC QN AUTO: 27.1 PG (ref 27–31)
MCHC RBC AUTO-ENTMCNC: 32.5 G/DL (ref 32–36)
MCV RBC AUTO: 83 FL (ref 82–98)
MONOCYTES # BLD AUTO: 0.4 K/UL (ref 0.3–1)
MONOCYTES NFR BLD: 6.3 % (ref 4–15)
NEUTROPHILS # BLD AUTO: 3.6 K/UL (ref 1.8–7.7)
NEUTROPHILS NFR BLD: 60.3 % (ref 38–73)
NRBC BLD-RTO: 0 /100 WBC
PLATELET # BLD AUTO: 386 K/UL (ref 150–450)
PMV BLD AUTO: 9 FL (ref 9.2–12.9)
POTASSIUM SERPL-SCNC: 4.6 MMOL/L (ref 3.5–5.1)
PROT SERPL-MCNC: 7.5 G/DL (ref 6–8.4)
RBC # BLD AUTO: 4.98 M/UL (ref 4–5.4)
SODIUM SERPL-SCNC: 138 MMOL/L (ref 136–145)
WBC # BLD AUTO: 5.9 K/UL (ref 3.9–12.7)

## 2024-03-11 PROCEDURE — 86140 C-REACTIVE PROTEIN: CPT | Performed by: PHYSICIAN ASSISTANT

## 2024-03-11 PROCEDURE — 80053 COMPREHEN METABOLIC PANEL: CPT | Performed by: PHYSICIAN ASSISTANT

## 2024-03-11 PROCEDURE — 85025 COMPLETE CBC W/AUTO DIFF WBC: CPT | Performed by: PHYSICIAN ASSISTANT

## 2024-03-11 PROCEDURE — 36415 COLL VENOUS BLD VENIPUNCTURE: CPT | Performed by: PHYSICIAN ASSISTANT

## 2024-03-11 PROCEDURE — 85652 RBC SED RATE AUTOMATED: CPT | Performed by: PHYSICIAN ASSISTANT

## 2024-03-18 ENCOUNTER — HOSPITAL ENCOUNTER (OUTPATIENT)
Dept: RADIOLOGY | Facility: HOSPITAL | Age: 39
Discharge: HOME | End: 2024-03-18
Attending: PHYSICIAN ASSISTANT
Payer: COMMERCIAL

## 2024-03-18 ENCOUNTER — OFFICE VISIT (OUTPATIENT)
Dept: RHEUMATOLOGY | Facility: CLINIC | Age: 39
End: 2024-03-18
Payer: COMMERCIAL

## 2024-03-18 ENCOUNTER — PATIENT MESSAGE (OUTPATIENT)
Dept: RHEUMATOLOGY | Facility: CLINIC | Age: 39
End: 2024-03-18

## 2024-03-18 ENCOUNTER — TELEPHONE (OUTPATIENT)
Dept: RHEUMATOLOGY | Facility: CLINIC | Age: 39
End: 2024-03-18

## 2024-03-18 VITALS
BODY MASS INDEX: 42.7 KG/M2 | SYSTOLIC BLOOD PRESSURE: 132 MMHG | DIASTOLIC BLOOD PRESSURE: 85 MMHG | HEIGHT: 68 IN | WEIGHT: 281.75 LBS | HEART RATE: 70 BPM

## 2024-03-18 DIAGNOSIS — R06.02 SOB (SHORTNESS OF BREATH): ICD-10-CM

## 2024-03-18 DIAGNOSIS — L40.59 POLYARTICULAR PSORIATIC ARTHRITIS: Primary | ICD-10-CM

## 2024-03-18 DIAGNOSIS — Z79.899 HIGH RISK MEDICATION USE: ICD-10-CM

## 2024-03-18 DIAGNOSIS — Z79.899 IMMUNOCOMPROMISED STATE DUE TO DRUG THERAPY: ICD-10-CM

## 2024-03-18 DIAGNOSIS — M79.7 FIBROMYALGIA: ICD-10-CM

## 2024-03-18 DIAGNOSIS — R53.83 FATIGUE, UNSPECIFIED TYPE: ICD-10-CM

## 2024-03-18 DIAGNOSIS — D84.821 IMMUNOCOMPROMISED STATE DUE TO DRUG THERAPY: ICD-10-CM

## 2024-03-18 DIAGNOSIS — Z51.81 MEDICATION MONITORING ENCOUNTER: ICD-10-CM

## 2024-03-18 PROCEDURE — 71046 X-RAY EXAM CHEST 2 VIEWS: CPT | Mod: 26,,, | Performed by: RADIOLOGY

## 2024-03-18 PROCEDURE — 3079F DIAST BP 80-89 MM HG: CPT | Mod: CPTII,S$GLB,, | Performed by: PHYSICIAN ASSISTANT

## 2024-03-18 PROCEDURE — 3044F HG A1C LEVEL LT 7.0%: CPT | Mod: CPTII,S$GLB,, | Performed by: PHYSICIAN ASSISTANT

## 2024-03-18 PROCEDURE — 1159F MED LIST DOCD IN RCRD: CPT | Mod: CPTII,S$GLB,, | Performed by: PHYSICIAN ASSISTANT

## 2024-03-18 PROCEDURE — 99215 OFFICE O/P EST HI 40 MIN: CPT | Mod: S$GLB,,, | Performed by: PHYSICIAN ASSISTANT

## 2024-03-18 PROCEDURE — 99999 PR PBB SHADOW E&M-EST. PATIENT-LVL V: CPT | Mod: PBBFAC,,, | Performed by: PHYSICIAN ASSISTANT

## 2024-03-18 PROCEDURE — 3075F SYST BP GE 130 - 139MM HG: CPT | Mod: CPTII,S$GLB,, | Performed by: PHYSICIAN ASSISTANT

## 2024-03-18 PROCEDURE — 3008F BODY MASS INDEX DOCD: CPT | Mod: CPTII,S$GLB,, | Performed by: PHYSICIAN ASSISTANT

## 2024-03-18 PROCEDURE — 71046 X-RAY EXAM CHEST 2 VIEWS: CPT | Mod: TC

## 2024-03-18 PROCEDURE — 1160F RVW MEDS BY RX/DR IN RCRD: CPT | Mod: CPTII,S$GLB,, | Performed by: PHYSICIAN ASSISTANT

## 2024-03-18 RX ORDER — HYDROCHLOROTHIAZIDE 25 MG/1
25 TABLET ORAL
COMMUNITY
Start: 2024-02-26 | End: 2024-08-24

## 2024-03-18 RX ORDER — GUSELKUMAB 100 MG/ML
INJECTION SUBCUTANEOUS
Qty: 2 EACH | Refills: 0 | Status: ACTIVE | OUTPATIENT
Start: 2024-03-18

## 2024-03-18 RX ORDER — PREGABALIN 100 MG/1
CAPSULE ORAL
Status: ON HOLD | COMMUNITY
Start: 2023-12-11 | End: 2024-05-10 | Stop reason: HOSPADM

## 2024-03-18 RX ORDER — GUSELKUMAB 100 MG/ML
100 INJECTION SUBCUTANEOUS
Qty: 1 EACH | Refills: 3 | Status: ACTIVE | OUTPATIENT
Start: 2024-03-18

## 2024-03-18 RX ORDER — PREGABALIN 150 MG/1
1 CAPSULE ORAL NIGHTLY
Status: ON HOLD | COMMUNITY
Start: 2024-02-26 | End: 2024-05-10 | Stop reason: HOSPADM

## 2024-03-18 RX ORDER — HYDROCHLOROTHIAZIDE 12.5 MG/1
12.5 TABLET ORAL EVERY MORNING
COMMUNITY
Start: 2024-02-02 | End: 2024-04-24

## 2024-03-18 NOTE — Clinical Note
Didn;'t do endoscopy b/c out of pocket would be $2k.  Wants to do it later in the year when the deductible will closer to being met.   Thoughts on CT abd w continued abd pain?  Says when she eats, she has early satiety.  If so, would it be need w or wo (or both) Vy Lundy PA-C [facility]

## 2024-03-18 NOTE — PATIENT INSTRUCTIONS
Labs requested  C3, C4, pc ratio, micro UA, Scl 70, RNA polymerase, ssb, ssa, sm, sm/rnp and ds DNA

## 2024-03-18 NOTE — Clinical Note
You dx'ed w PsA (fam h/o PsO, inflammatory arthritis, and enthesitis - neg RF).   Failed MTX, humira, rinvoq, cosentyx and stelara. Now w CRP 50 (no sign of infxn) - enthesitis, achilees, +++ MTP squeeze bilat feel, and SI joints  I am planning to start Tremfya.  W fam h/o SLE, also doing SLE labs (previous +SHANNON is now negative). Also fam h/o RA Thoughts on [employer] as alternative to cover RA and PsA?  Or would you play tremfya out first?

## 2024-03-18 NOTE — PROGRESS NOTES
Subjective:      Patient ID: Bee Condon is a 38 y.o. female.    Chief Complaint: Psoriatic Arthritis and Disease Management    HPI   Bee Condon  is a 38 y.o. female seen for f/u PsA (w/o PsO) and FM.  Failed MTX, rinvoq, humira, and Cosentyx.  Started Stelara about 4 mos ago.  C/o severe worsening inflammatory joint pain and am stiffness most of the day.   C/o multi joint pain - Bilat SI, Lt achilles, bilat feet.  No associated weakness.  No Raynaud's.  Dx by Dr. BEST based on fam h/o PsO, neg RF, and migratory inflammatory arthritis.      Patient with history of abdominal pain.  She is on pantoprazole.  They suspect psoriatic arthritis is contributing to the abdominal pain.  GI was planning endoscopy but early in the year it is cost prohibitive d/t her deductible.      On 6 mg naltrexone for fibromyalgia.  Has been helpful for her.  Fatigue has been worsening over the last few mos.  She takes Lyrica 75 mg qam and 150 mg qhs.  Fatigue worsened when inflammatory arthritis started flaring more and Lyrica was increased.    Recently had to deal with serotonin syndrome.  She is under the care of a psychiatrist.  They lowered 1 of her medications and she is back to baseline.  Failed cymbalta in past - she suspects Serotonin syndrome.  Worsening depression/anxiety over the last 4 mos or so.  Lyrica has been helpful.  Also admits to suicidal thoughts in last 4-6 mos (non presently).  She continue to follow w her psych.     Also c/o low back pain that radiates down the RLE.  Describes it as burning and shooting.  Progressively worsening.  Wants to avoid steroids as she has s/e from them.    Patient c/o photosensitivity but denies fevers, chills, eye pain, shortness of breath, chest pain, hematuria, blood in the stool, rash, sicca symptoms, raynauds, finger ulcerations.  Rheumatologic systems otherwise negative.    Serologies/Labs:  Low titer SHANNON (speckled) - repeat was neg  Neg RF, CCP, HLA B27  Current  Treatment:  Stelara  LDN 6 mg  Lyrica - 75 mg qam and 150mg qhs  Previous Treatment:   MTX - intolerance - severe fatigue  Humira - s/e - rash and feeling sick for several days  Rinvoq - ineffective  Cosentyx - ineffective  Cymbalta - serotonin syndrome?    Current Outpatient Medications:     azelastine (ASTELIN) 137 mcg (0.1 %) nasal spray, 1-2 SEN BID PRN, Disp: , Rfl:     cetirizine (ZYRTEC) 10 MG tablet, Take 10 mg by mouth., Disp: , Rfl:     clonazePAM (KLONOPIN) 0.5 MG tablet, Take 0.5 mg by mouth 2 (two) times daily as needed., Disp: , Rfl:     d-mannose 500 mg Cap, Take by mouth., Disp: , Rfl:     estrogens,esterified,-methyltestosterone 1.25-2.5mg (ESTRATEST) per tablet, Take by mouth., Disp: , Rfl:     hydroCHLOROthiazide (HYDRODIURIL) 12.5 MG Tab, Take 12.5 mg by mouth every morning., Disp: , Rfl:     hydroCHLOROthiazide (HYDRODIURIL) 25 MG tablet, Take 25 mg by mouth., Disp: , Rfl:     levocetirizine (XYZAL) 5 MG tablet, Take 5 mg by mouth every evening., Disp: , Rfl:     lisdexamfetamine (VYVANSE) 30 MG capsule, , Disp: , Rfl:     MAGNESIUM GLYCINATE ORAL, Take by mouth., Disp: , Rfl:     ondansetron (ZOFRAN-ODT) 4 MG TbDL, Take 4 mg by mouth daily as needed., Disp: , Rfl:     pantoprazole (PROTONIX) 20 MG tablet, Take 1 tablet (20 mg total) by mouth once daily., Disp: 30 tablet, Rfl: 5    pregabalin (LYRICA) 100 MG capsule, 75mg, Disp: , Rfl:     pregabalin (LYRICA) 150 MG capsule, Take 1 capsule by mouth every evening., Disp: , Rfl:     rizatriptan (MAXALT) 10 MG tablet, Take by mouth., Disp: , Rfl:     triamcinolone acetonide 0.1% (KENALOG) 0.1 % cream, AAA bid prn rash of arms. Moderate steroid., Disp: 80 g, Rfl: 0    VIIBRYD 10 mg Tab tablet, Take 10 mg by mouth once daily., Disp: , Rfl:     naltrexone capsule, 7 mg qhs, Disp: 90 capsule, Rfl: 1    Past Medical History:   Diagnosis Date    Anxiety     Depression     Interstitial cystitis 2019     Family History   Problem Relation Age of Onset     "Cancer Mother     Heart disease Father     Diabetes Mellitus Father     Cancer Paternal Aunt     Hyperlipidemia Paternal Aunt      Social History     Socioeconomic History    Marital status:    Tobacco Use    Smoking status: Never    Smokeless tobacco: Never   Substance and Sexual Activity    Alcohol use: Not Currently    Drug use: Never    Sexual activity: Yes     Review of patient's allergies indicates:   Allergen Reactions    Iodine     Oxycodone-acetaminophen Hives and Itching    Serotonin 5ht-3 antagonists     Shellfish containing products        Objective:   /85   Pulse 70   Ht 5' 8" (1.727 m)   Wt 127.8 kg (281 lb 12 oz)   BMI 42.84 kg/m²   Immunization History   Administered Date(s) Administered    COVID-19 MRNA, LN-S PF (MODERNA HALF 0.25 ML DOSE) 11/08/2021    COVID-19, MRNA, LN-S, PF (MODERNA FULL 0.5 ML DOSE) 02/27/2021, 03/25/2021    COVID-19, mRNA, LNP-S, PF, mary-sucrose, 30 mcg/0.3 mL (Pfizer 2023 Ages 12+) 01/12/2024    Influenza (FLUAD) - Quadrivalent - Adjuvanted - PF *Preferred* (65+) 09/19/2023    Influenza - Quadrivalent - PF *Preferred* (6 months and older) 12/14/2015, 10/15/2018, 09/19/2019, 09/17/2020, 11/08/2021, 09/16/2022    Pneumococcal Conjugate - 20 Valent 09/28/2023       Physical Exam   Constitutional: She is oriented to person, place, and time. No distress.   HENT:   Head: Normocephalic and atraumatic.   Pulmonary/Chest: Effort normal.   Musculoskeletal:         General: Swelling and tenderness present.      Cervical back: Normal range of motion.   Neurological: She is alert and oriented to person, place, and time.   Psychiatric: Mood normal.   100% fist b/l    Enthesitis left achilles, lt pat tendon, Rt>Lt SI joint, bilat SI joints  No effusions  Preserved ROM      Recent Results (from the past 672 hour(s))   CBC Auto Differential    Collection Time: 03/11/24  1:18 PM   Result Value Ref Range    WBC 5.90 3.90 - 12.70 K/uL    RBC 4.98 4.00 - 5.40 M/uL    Hemoglobin " 13.5 12.0 - 16.0 g/dL    Hematocrit 41.5 37.0 - 48.5 %    MCV 83 82 - 98 fL    MCH 27.1 27.0 - 31.0 pg    MCHC 32.5 32.0 - 36.0 g/dL    RDW 13.6 11.5 - 14.5 %    Platelets 386 150 - 450 K/uL    MPV 9.0 (L) 9.2 - 12.9 fL    Immature Granulocytes 0.3 0.0 - 0.5 %    Gran # (ANC) 3.6 1.8 - 7.7 K/uL    Immature Grans (Abs) 0.02 0.00 - 0.04 K/uL    Lymph # 1.6 1.0 - 4.8 K/uL    Mono # 0.4 0.3 - 1.0 K/uL    Eos # 0.3 0.0 - 0.5 K/uL    Baso # 0.04 0.00 - 0.20 K/uL    nRBC 0 0 /100 WBC    Gran % 60.3 38.0 - 73.0 %    Lymph % 27.3 18.0 - 48.0 %    Mono % 6.3 4.0 - 15.0 %    Eosinophil % 5.1 0.0 - 8.0 %    Basophil % 0.7 0.0 - 1.9 %    Differential Method Automated    Comprehensive Metabolic Panel    Collection Time: 03/11/24  1:18 PM   Result Value Ref Range    Sodium 138 136 - 145 mmol/L    Potassium 4.6 3.5 - 5.1 mmol/L    Chloride 101 95 - 110 mmol/L    CO2 30 (H) 23 - 29 mmol/L    Glucose 98 70 - 110 mg/dL    BUN 8 6 - 20 mg/dL    Creatinine 1.0 0.5 - 1.4 mg/dL    Calcium 9.1 8.7 - 10.5 mg/dL    Total Protein 7.5 6.0 - 8.4 g/dL    Albumin 3.7 3.5 - 5.2 g/dL    Total Bilirubin 0.4 0.1 - 1.0 mg/dL    Alkaline Phosphatase 85 55 - 135 U/L    AST 17 10 - 40 U/L    ALT 18 10 - 44 U/L    eGFR >60 >60 mL/min/1.73 m^2    Anion Gap 7 (L) 8 - 16 mmol/L   Sedimentation rate    Collection Time: 03/11/24  1:18 PM   Result Value Ref Range    Sed Rate 29 0 - 36 mm/Hr   C-Reactive Protein    Collection Time: 03/11/24  1:18 PM   Result Value Ref Range    CRP 50.1 (H) 0.0 - 8.2 mg/L   Urinalysis Microscopic    Collection Time: 03/18/24 10:24 AM   Result Value Ref Range    RBC, UA 5 (H) 0 - 4 /hpf    WBC, UA 4 0 - 5 /hpf    Bacteria Many (A) None-Occ /hpf    Squam Epithel, UA 4 /hpf    Microscopic Comment SEE COMMENT      Recent Results (from the past 1008 hour(s))   CBC Auto Differential    Collection Time: 03/11/24  1:18 PM   Result Value Ref Range    WBC 5.90 3.90 - 12.70 K/uL    RBC 4.98 4.00 - 5.40 M/uL    Hemoglobin 13.5 12.0 - 16.0  g/dL    Hematocrit 41.5 37.0 - 48.5 %    MCV 83 82 - 98 fL    MCH 27.1 27.0 - 31.0 pg    MCHC 32.5 32.0 - 36.0 g/dL    RDW 13.6 11.5 - 14.5 %    Platelets 386 150 - 450 K/uL    MPV 9.0 (L) 9.2 - 12.9 fL    Immature Granulocytes 0.3 0.0 - 0.5 %    Gran # (ANC) 3.6 1.8 - 7.7 K/uL    Immature Grans (Abs) 0.02 0.00 - 0.04 K/uL    Lymph # 1.6 1.0 - 4.8 K/uL    Mono # 0.4 0.3 - 1.0 K/uL    Eos # 0.3 0.0 - 0.5 K/uL    Baso # 0.04 0.00 - 0.20 K/uL    nRBC 0 0 /100 WBC    Gran % 60.3 38.0 - 73.0 %    Lymph % 27.3 18.0 - 48.0 %    Mono % 6.3 4.0 - 15.0 %    Eosinophil % 5.1 0.0 - 8.0 %    Basophil % 0.7 0.0 - 1.9 %    Differential Method Automated    Comprehensive Metabolic Panel    Collection Time: 03/11/24  1:18 PM   Result Value Ref Range    Sodium 138 136 - 145 mmol/L    Potassium 4.6 3.5 - 5.1 mmol/L    Chloride 101 95 - 110 mmol/L    CO2 30 (H) 23 - 29 mmol/L    Glucose 98 70 - 110 mg/dL    BUN 8 6 - 20 mg/dL    Creatinine 1.0 0.5 - 1.4 mg/dL    Calcium 9.1 8.7 - 10.5 mg/dL    Total Protein 7.5 6.0 - 8.4 g/dL    Albumin 3.7 3.5 - 5.2 g/dL    Total Bilirubin 0.4 0.1 - 1.0 mg/dL    Alkaline Phosphatase 85 55 - 135 U/L    AST 17 10 - 40 U/L    ALT 18 10 - 44 U/L    eGFR >60 >60 mL/min/1.73 m^2    Anion Gap 7 (L) 8 - 16 mmol/L   Sedimentation rate    Collection Time: 03/11/24  1:18 PM   Result Value Ref Range    Sed Rate 29 0 - 36 mm/Hr   C-Reactive Protein    Collection Time: 03/11/24  1:18 PM   Result Value Ref Range    CRP 50.1 (H) 0.0 - 8.2 mg/L   Urinalysis Microscopic    Collection Time: 03/18/24 10:24 AM   Result Value Ref Range    RBC, UA 5 (H) 0 - 4 /hpf    WBC, UA 4 0 - 5 /hpf    Bacteria Many (A) None-Occ /hpf    Squam Epithel, UA 4 /hpf    Microscopic Comment SEE COMMENT           Lab Results   Component Value Date    TBGOLDPLUS Negative 09/19/2023      Lab Results   Component Value Date    HEPAIGM Non-reactive 09/19/2023    HEPBIGM Non-reactive 09/19/2023    HEPBCAB Negative 05/24/2022    HEPCAB Non-reactive  09/19/2023        Imaging  I have personally reviewed images and reports as below.  I agree with the interpretation.  X-Ray Chest PA And Lateral  Narrative: EXAMINATION:  XR CHEST PA AND LATERAL    CLINICAL HISTORY:  Shortness of breath    TECHNIQUE:  PA and lateral views of the chest were performed.    COMPARISON:  None    FINDINGS:  The lungs are clear and free of infiltrate.  No pleural effusion or pneumothorax. The heart is not enlarged.  Impression: 1.  No acute cardiopulmonary process.    Electronically signed by: Cole Carlton DO  Date:    03/18/2024  Time:    10:03          Assessment:     1. Polyarticular psoriatic arthritis    2. Medication monitoring encounter    3. High risk medication use    4. Immunocompromised state due to drug therapy    5. Fibromyalgia    6. Fatigue, unspecified type    7. SOB (shortness of breath)        Plan:     Bee was seen today for psoriatic arthritis and disease management.    Diagnoses and all orders for this visit:    Polyarticular psoriatic arthritis  -     ANTI -SSA ANTIBODY; Future  -     Sjogrens syndrome-B extractable nuclear antibody; Future  -     Anti-Smith Antibody; Future  -     Anti Sm/RNP Antibody; Future  -     ANTI-DNA ANTIBODY, DOUBLE-STRANDED; Future  -     RNA polymerase III Ab, IgG; Future  -     Anti-Scleroderma Antibody; Future  -     Protein/Creatinine Ratio, Urine; Standing  -     C4 Complement; Standing  -     C3 Complement; Standing  -     Urinalysis Microscopic; Standing    Medication monitoring encounter    High risk medication use    Immunocompromised state due to drug therapy    Fibromyalgia  -     naltrexone capsule; 7 mg qhs    Fatigue, unspecified type  -     naltrexone capsule; 7 mg qhs  -     ANTI -SSA ANTIBODY; Future  -     Sjogrens syndrome-B extractable nuclear antibody; Future  -     Anti-Smith Antibody; Future  -     Anti Sm/RNP Antibody; Future  -     ANTI-DNA ANTIBODY, DOUBLE-STRANDED; Future    SOB (shortness of breath)  -      X-Ray Chest PA And Lateral; Future  -     RNA polymerase III Ab, IgG; Future  -     Anti-Scleroderma Antibody; Future    Severe worsening PsA   Worsening joint pain/swelling and stiffness w active enthesitis on exam  Labs reviewed  CBC/CMP stable  ESR/CRP significantly elevated  DC stelara   Start Tremfya - PsA dosing  Discussed risks/benefits  Literature provided  Rx sent to [facility] for auth  Consider orencia as an alternative for RA and PsA coverage  Would avoid otezla w recent suicidal thoughts and worsening anxiety/depression  TB and hep serologies updated 9/2023  W fam h/o of RA and SLE, pt would like to do further bw for eval  Check C3, C4, PC ratio, micro UA, scl 70, rna polymerase  SHANNON low + at one point, repeat was negative  Will discuss her case w Dr. BEST (chart cc'ed to him today)  SOB  CXR today reviewed as above  Scl 70 and rna polymerase today w remaining SLE labs  FM   C/w LDN - go to 7.5 mg qhs   Tolerating it well  Worsening fatigue  Likely multifactorial  Uncontrolled inflammatory arthritis  Aslo potential s/e of Lyrica  LBP w radiating symptoms down the RLE  Xray l spine today  EMG referral placed  Consider IPM ref pending above.  Abd pain  F/u w GI  Will check w GI if CT scan is appropriate  C/w PPI  Drug therapy requiring intensive monitoring for toxicity  High Risk Medication Monitoring encounter  No current medication related issues, no evidence of toxicity  I ordered labs for toxicity monitoring, have personally reviewed the findings, and discussed them with the patient.  Pending labs will be sent via the portal  Compromised immune system secondary to autoimmune disease and/or use of immunosuppressive drugs.  Monitor carefully for infections.  Advised patient to get immediate medical care if any infection arises.  Also advised strict adherence age-appropriate vaccinations and cancer screenings with PCP.  Patient advised to hold DMARD and/or biologic therapy for signs of infection or for surgery. If  you are unsure what to do please call our office for instruction.[facility] [place] [phone]  Return to clinic: 3-4 mos, reg 4 prior    56 minutes of total time spent on the encounter, which includes face to face time and non-face to face time preparing to see the patient (eg, review of tests), Obtaining and/or reviewing separately obtained history, Documenting clinical information in the electronic or other health record, Independently interpreting results (not separately reported) and communicating results to the patient/family/caregiver, or Care coordination (not separately reported).     Follow up in about 4 months (around 7/18/2024).    The patient understands, chooses and consents to this plan and accepts all the risks which include but are not limited to the risks mentioned above.     Disclaimer: This note was prepared using a voice recognition system and is likely to have sound alike errors within the text.

## 2024-03-19 NOTE — TELEPHONE ENCOUNTER
----- Message from Cm Man MD sent at 3/18/2024  4:59 PM CDT -----  Okay with either one.   ----- Message -----  From: Vy Lundy PA-C  Sent: 3/18/2024  10:51 AM CDT  To: Cm Man MD    You dx'ed w PsA (fam h/o PsO, inflammatory arthritis, and enthesitis - neg RF).    Failed MTX, humira, rinvoq, cosentyx and stelara.  Now w CRP 50 (no sign of infxn) - enthesitis, achilees, +++ MTP squeeze bilat feel, and SI joints   I am planning to start Tremfya.  W fam h/o SLE, also doing SLE labs (previous +SHANNON is now negative). Also fam h/o RA  Thoughts on O[facility] as alternative to cover RA and PsA?  Or would you play tremfya out first?

## 2024-03-24 ENCOUNTER — PATIENT MESSAGE (OUTPATIENT)
Dept: RHEUMATOLOGY | Facility: CLINIC | Age: 39
End: 2024-03-24
Payer: COMMERCIAL

## 2024-03-28 DIAGNOSIS — N39.0 RECURRENT UTI: Primary | ICD-10-CM

## 2024-04-04 ENCOUNTER — OFFICE VISIT (OUTPATIENT)
Dept: DERMATOLOGY | Facility: CLINIC | Age: 39
End: 2024-04-04
Payer: COMMERCIAL

## 2024-04-04 ENCOUNTER — TELEPHONE (OUTPATIENT)
Dept: DERMATOLOGY | Facility: CLINIC | Age: 39
End: 2024-04-04
Payer: COMMERCIAL

## 2024-04-04 VITALS — WEIGHT: 284 LBS | HEIGHT: 68 IN | BODY MASS INDEX: 43.04 KG/M2

## 2024-04-04 DIAGNOSIS — L85.3 XEROSIS CUTIS: ICD-10-CM

## 2024-04-04 DIAGNOSIS — L85.8 KERATOSIS PILARIS: Primary | ICD-10-CM

## 2024-04-04 PROCEDURE — 3044F HG A1C LEVEL LT 7.0%: CPT | Mod: CPTII,S$GLB,, | Performed by: STUDENT IN AN ORGANIZED HEALTH CARE EDUCATION/TRAINING PROGRAM

## 2024-04-04 PROCEDURE — 99999 PR PBB SHADOW E&M-EST. PATIENT-LVL IV: CPT | Mod: PBBFAC,,, | Performed by: STUDENT IN AN ORGANIZED HEALTH CARE EDUCATION/TRAINING PROGRAM

## 2024-04-04 PROCEDURE — G2211 COMPLEX E/M VISIT ADD ON: HCPCS | Mod: S$GLB,,, | Performed by: STUDENT IN AN ORGANIZED HEALTH CARE EDUCATION/TRAINING PROGRAM

## 2024-04-04 PROCEDURE — 1160F RVW MEDS BY RX/DR IN RCRD: CPT | Mod: CPTII,S$GLB,, | Performed by: STUDENT IN AN ORGANIZED HEALTH CARE EDUCATION/TRAINING PROGRAM

## 2024-04-04 PROCEDURE — 3008F BODY MASS INDEX DOCD: CPT | Mod: CPTII,S$GLB,, | Performed by: STUDENT IN AN ORGANIZED HEALTH CARE EDUCATION/TRAINING PROGRAM

## 2024-04-04 PROCEDURE — 99213 OFFICE O/P EST LOW 20 MIN: CPT | Mod: S$GLB,,, | Performed by: STUDENT IN AN ORGANIZED HEALTH CARE EDUCATION/TRAINING PROGRAM

## 2024-04-04 PROCEDURE — 1159F MED LIST DOCD IN RCRD: CPT | Mod: CPTII,S$GLB,, | Performed by: STUDENT IN AN ORGANIZED HEALTH CARE EDUCATION/TRAINING PROGRAM

## 2024-04-04 NOTE — TELEPHONE ENCOUNTER
----- Message from Hung Trujillo sent at 4/4/2024  8:58 AM CDT -----  Contact: Bee Gamboa called to let the office know she went to the wrong location, she went to the [place] but is on her way now and is about 10 min away. please call her at [phone] if needed.  thanks   am

## 2024-04-04 NOTE — PROGRESS NOTES
Subjective:       Patient ID:  Bee Condon is a 38 y.o. female who presents for   Chief Complaint   Patient presents with    Rash    Psoriasis     History of Present Illness: The patient presents with chief complaint of dry, irritated skin. Patient with a history of psoriatic arthritis.  Location: mostly on the arms, some on the edge of the hands  Duration: ongoing for months  Signs/Symptoms: reports having persistent rough, bumpy, dry and often itchy skin. Denies any red plaques on the skin.   Prior treatments: has been trying OTC moisturizer with minimal improvement.       Rash    Psoriasis        Review of Systems   Constitutional:  Negative for fever and chills.   Skin:  Positive for rash and dry skin.        Objective:    Physical Exam   Constitutional: She appears well-developed and well-nourished. No distress.   Neurological: She is alert and oriented to person, place, and time. She is not disoriented.   Psychiatric: She has a normal mood and affect.   Skin:   Areas Examined (abnormalities noted in diagram):   Head / Face Inspection Performed  Neck Inspection Performed  RUE Inspected  LUE Inspection Performed              Diagram Legend     Erythematous scaling macule/papule c/w actinic keratosis       Vascular papule c/w angioma      Pigmented verrucoid papule/plaque c/w seborrheic keratosis      Yellow umbilicated papule c/w sebaceous hyperplasia      Irregularly shaped tan macule c/w lentigo     1-2 mm smooth white papules consistent with Milia      Movable subcutaneous cyst with punctum c/w epidermal inclusion cyst      Subcutaneous movable cyst c/w pilar cyst      Firm pink to brown papule c/w dermatofibroma      Pedunculated fleshy papule(s) c/w skin tag(s)      Evenly pigmented macule c/w junctional nevus     Mildly variegated pigmented, slightly irregular-bordered macule c/w mildly atypical nevus      Flesh colored to evenly pigmented papule c/w intradermal nevus       Pink pearly papule/plaque  c/w basal cell carcinoma      Erythematous hyperkeratotic cursted plaque c/w SCC      Surgical scar with no sign of skin cancer recurrence      Open and closed comedones      Inflammatory papules and pustules      Verrucoid papule consistent consistent with wart     Erythematous eczematous patches and plaques     Dystrophic onycholytic nail with subungual debris c/w onychomycosis     Umbilicated papule    Erythematous-base heme-crusted tan verrucoid plaque consistent with inflamed seborrheic keratosis     Erythematous Silvery Scaling Plaque c/w Psoriasis     See annotation      Assessment / Plan:        Keratosis pilaris  Xerosis cutis  Recommend using a mild, moisturizing soap as Keratosis pilaris is exacerbated by dry skin.  OTC keratolytics (Am lactin, Carmol, Ureamide, Kerasal) recommended for daily use after bath or shower.    No evidence for psoriasis or other autoimmune condition.          Follow up in about 1 year (around 4/4/2025).

## 2024-04-22 ENCOUNTER — OFFICE VISIT (OUTPATIENT)
Dept: UROLOGY | Facility: CLINIC | Age: 39
End: 2024-04-22
Payer: COMMERCIAL

## 2024-04-22 VITALS — SYSTOLIC BLOOD PRESSURE: 114 MMHG | RESPIRATION RATE: 16 BRPM | HEART RATE: 86 BPM | DIASTOLIC BLOOD PRESSURE: 79 MMHG

## 2024-04-22 DIAGNOSIS — N39.0 RECURRENT UTI: ICD-10-CM

## 2024-04-22 DIAGNOSIS — N30.10 IC (INTERSTITIAL CYSTITIS): Primary | ICD-10-CM

## 2024-04-22 LAB
BILIRUB UR QL STRIP: NEGATIVE
CLARITY UR REFRACT.AUTO: CLEAR
COLOR UR AUTO: COLORLESS
GLUCOSE UR QL STRIP: NEGATIVE
HGB UR QL STRIP: ABNORMAL
KETONES UR QL STRIP: NEGATIVE
LEUKOCYTE ESTERASE UR QL STRIP: NEGATIVE
NITRITE UR QL STRIP: NEGATIVE
PH UR STRIP: 6 [PH] (ref 5–8)
PROT UR QL STRIP: NEGATIVE
SP GR UR STRIP: 1.02 (ref 1–1.03)
URN SPEC COLLECT METH UR: ABNORMAL

## 2024-04-22 PROCEDURE — 99999 PR PBB SHADOW E&M-EST. PATIENT-LVL IV: CPT | Mod: PBBFAC,,, | Performed by: NURSE PRACTITIONER

## 2024-04-22 PROCEDURE — 3078F DIAST BP <80 MM HG: CPT | Mod: CPTII,S$GLB,, | Performed by: NURSE PRACTITIONER

## 2024-04-22 PROCEDURE — 87086 URINE CULTURE/COLONY COUNT: CPT | Performed by: NURSE PRACTITIONER

## 2024-04-22 PROCEDURE — 1160F RVW MEDS BY RX/DR IN RCRD: CPT | Mod: CPTII,S$GLB,, | Performed by: NURSE PRACTITIONER

## 2024-04-22 PROCEDURE — 87088 URINE BACTERIA CULTURE: CPT | Performed by: NURSE PRACTITIONER

## 2024-04-22 PROCEDURE — 81003 URINALYSIS AUTO W/O SCOPE: CPT | Performed by: NURSE PRACTITIONER

## 2024-04-22 PROCEDURE — 99204 OFFICE O/P NEW MOD 45 MIN: CPT | Mod: S$GLB,,, | Performed by: NURSE PRACTITIONER

## 2024-04-22 PROCEDURE — 3074F SYST BP LT 130 MM HG: CPT | Mod: CPTII,S$GLB,, | Performed by: NURSE PRACTITIONER

## 2024-04-22 PROCEDURE — 1159F MED LIST DOCD IN RCRD: CPT | Mod: CPTII,S$GLB,, | Performed by: NURSE PRACTITIONER

## 2024-04-22 PROCEDURE — 3044F HG A1C LEVEL LT 7.0%: CPT | Mod: CPTII,S$GLB,, | Performed by: NURSE PRACTITIONER

## 2024-04-22 PROCEDURE — 87186 SC STD MICRODIL/AGAR DIL: CPT | Performed by: NURSE PRACTITIONER

## 2024-04-22 PROCEDURE — 87077 CULTURE AEROBIC IDENTIFY: CPT | Performed by: NURSE PRACTITIONER

## 2024-04-22 RX ORDER — METHENAMINE, SODIUM PHOSPHATE, MONOBASIC, MONOHYDRATE, PHENYL SALICYLATE, METHYLENE BLUE, AND HYOSCYAMINE SULFATE 118; 40.8; 36; 10; .12 MG/1; MG/1; MG/1; MG/1; MG/1
1 CAPSULE ORAL 4 TIMES DAILY PRN
Qty: 120 CAPSULE | Refills: 2 | Status: SHIPPED | OUTPATIENT
Start: 2024-04-22 | End: 2024-04-29

## 2024-04-22 RX ORDER — PANTOPRAZOLE SODIUM 20 MG/1
20 TABLET, DELAYED RELEASE ORAL DAILY
Qty: 30 TABLET | Refills: 5 | Status: SHIPPED | OUTPATIENT
Start: 2024-04-22 | End: 2024-05-01

## 2024-04-22 NOTE — PROGRESS NOTES
Chief Complaint:   Interstitial cystitis    HPI:  Patient is a 38-year-old female that is presenting with interstitial cystitis.  Patient states she has been controlling her symptoms with behavior modifications, she understands her triggers, and voids them.  No current treatment.  Has not seen a urologist in years.  States that  she has symptoms of possible urinary tract infection, was seen by primary care provider, urine was negative.  States she has had an increase in urinary frequency. Denies gross hematuria.  No pelvic or flank pain.  States that Uribel has been prescribed with good results, on a short-term basis.  Urine in clinic indicates small amount of blood, all other parameters were negative.  PVR was 15 mL.  No history of renal stones.    Allergies:  Iodine, Oxycodone-acetaminophen, Serotonin 5ht-3 antagonists, and Shellfish containing products    Medications:  has a current medication list which includes the following prescription(s): azelastine, cetirizine, clonazepam, d-mannose, estrogens(esterified)-methyltestosterone 1.25-2.5mg, tremfya, tremfya, hydrochlorothiazide, levocetirizine, lisdexamfetamine, magnesium glycinate, naltrexone, ondansetron, pregabalin, pregabalin, rizatriptan, viibryd, hydrochlorothiazide, uribel, pantoprazole, and triamcinolone acetonide 0.1%.    Review of Systems:  General: No fever, chills, fatigability, or weight loss.  Skin: No rashes, itching, or changes in color or texture of skin.  Chest: Denies DIAZ, cyanosis, wheezing, cough, and sputum production.  Abdomen: Appetite fine. No weight loss. Denies diarrhea, abdominal pain, hematemesis, or blood in stool.  Musculoskeletal: No joint stiffness or swelling. Denies back pain.  : As above.  All other review of systems negative.    PMH:   has a past medical history of Anxiety, Depression, and Interstitial cystitis (2019).    PSH:   has a past surgical history that includes  section and Tubal ligation.    FamHx: family  history includes Cancer in her mother and paternal aunt; Diabetes Mellitus in her father; Heart disease in her father; Hyperlipidemia in her paternal aunt.    SocHx:  reports that she has never smoked. She has never used smokeless tobacco. She reports that she does not currently use alcohol. She reports that she does not use drugs.      Physical Exam:  Vitals:    04/22/24 1120   BP: 114/79   Pulse: 86   Resp: 16     General:   Morbidly obese female,A&Ox3, no apparent distress, no deformities  Neck: No masses, normal thyroid  Lungs: normal inspiration, no use of accessory muscles  Heart: normal pulse, no arrhythmias  Abdomen: Soft, NT, ND, no masses, no hernias, no hepatosplenomegaly  Lymphatic: Neck and groin nodes negative  Skin: The skin is warm and dry. No jaundice.  Labs/Studies:    See HPI    Impression/Plan:    Interstitial cystitis   Urine was sent for culture and urinalysis.  Patient was prescribed Uribel.  Will use the patient portal for bidirectional communication if symptoms increase in no resolution with current plan of care.

## 2024-04-24 ENCOUNTER — PATIENT MESSAGE (OUTPATIENT)
Dept: UROLOGY | Facility: CLINIC | Age: 39
End: 2024-04-24
Payer: COMMERCIAL

## 2024-04-24 ENCOUNTER — OFFICE VISIT (OUTPATIENT)
Dept: GASTROENTEROLOGY | Facility: CLINIC | Age: 39
End: 2024-04-24
Payer: COMMERCIAL

## 2024-04-24 VITALS
DIASTOLIC BLOOD PRESSURE: 70 MMHG | SYSTOLIC BLOOD PRESSURE: 122 MMHG | HEIGHT: 68 IN | HEART RATE: 80 BPM | WEIGHT: 278.69 LBS | BODY MASS INDEX: 42.24 KG/M2

## 2024-04-24 DIAGNOSIS — R13.10 DYSPHAGIA, UNSPECIFIED TYPE: ICD-10-CM

## 2024-04-24 DIAGNOSIS — R10.13 EPIGASTRIC PAIN: Primary | ICD-10-CM

## 2024-04-24 DIAGNOSIS — R11.0 NAUSEA: ICD-10-CM

## 2024-04-24 PROCEDURE — 3008F BODY MASS INDEX DOCD: CPT | Mod: CPTII,S$GLB,, | Performed by: NURSE PRACTITIONER

## 2024-04-24 PROCEDURE — 99214 OFFICE O/P EST MOD 30 MIN: CPT | Mod: S$GLB,,, | Performed by: NURSE PRACTITIONER

## 2024-04-24 PROCEDURE — 1159F MED LIST DOCD IN RCRD: CPT | Mod: CPTII,S$GLB,, | Performed by: NURSE PRACTITIONER

## 2024-04-24 PROCEDURE — 3044F HG A1C LEVEL LT 7.0%: CPT | Mod: CPTII,S$GLB,, | Performed by: NURSE PRACTITIONER

## 2024-04-24 PROCEDURE — 3074F SYST BP LT 130 MM HG: CPT | Mod: CPTII,S$GLB,, | Performed by: NURSE PRACTITIONER

## 2024-04-24 PROCEDURE — 1160F RVW MEDS BY RX/DR IN RCRD: CPT | Mod: CPTII,S$GLB,, | Performed by: NURSE PRACTITIONER

## 2024-04-24 PROCEDURE — 99999 PR PBB SHADOW E&M-EST. PATIENT-LVL V: CPT | Mod: PBBFAC,,, | Performed by: NURSE PRACTITIONER

## 2024-04-24 PROCEDURE — 3078F DIAST BP <80 MM HG: CPT | Mod: CPTII,S$GLB,, | Performed by: NURSE PRACTITIONER

## 2024-04-24 RX ORDER — NITROFURANTOIN 25; 75 MG/1; MG/1
100 CAPSULE ORAL 2 TIMES DAILY
Qty: 20 CAPSULE | Refills: 0 | Status: ON HOLD | OUTPATIENT
Start: 2024-04-24 | End: 2024-05-10 | Stop reason: HOSPADM

## 2024-04-24 NOTE — PROGRESS NOTES
"Clinic Follow Up:  [facility] Follow Up Note    Reason for Follow Up:  The primary encounter diagnosis was Epigastric pain. Diagnoses of Dysphagia, unspecified type and Nausea were also pertinent to this visit.    PCP: Germania Hernandez       HPI:  This is a 38 y.o. female here for follow up of dysphagia.   She has been having symptoms lately.   She reports early satiety and always feeling full.   She also reports the sensation that her food is slower to clear esophagus. She has some epigastric discomfort. +ve nausea when trying to eat. No vomiting.   She does suffer with depression and does feel like this is contributing to her symptoms. She is followed by psychiatrist and sees her regularly.     She has bowel movements about 1-2 times per day that are urgent. Her bowel movements are loose and "pudding" consistency.     Review of Systems   Constitutional:  Negative for activity change and appetite change.        As per interval history above   Respiratory:  Negative for cough and shortness of breath.    Cardiovascular:  Negative for chest pain.   Gastrointestinal:  Positive for abdominal pain, diarrhea and nausea. Negative for blood in stool, constipation and vomiting.   Skin:  Negative for color change and rash.       Medical History:  Past Medical History:   Diagnosis Date    Anxiety     Depression     Interstitial cystitis        Surgical History:   Past Surgical History:   Procedure Laterality Date     SECTION      TUBAL LIGATION         Family History:   Family History   Problem Relation Name Age of Onset    Cancer Mother      Heart disease Father      Diabetes Mellitus Father      Cancer Paternal Aunt      Hyperlipidemia Paternal Aunt         Social History:   Social History     Tobacco Use    Smoking status: Never    Smokeless tobacco: Never   Substance Use Topics    Alcohol use: Not Currently    Drug use: Never       Allergies:   Review of patient's allergies indicates: "   Allergen Reactions    Iodine     Oxycodone-acetaminophen Hives and Itching    Serotonin 5ht-3 antagonists     Shellfish containing products        Home Medications:  Current Outpatient Medications on File Prior to Visit   Medication Sig Dispense Refill    azelastine (ASTELIN) 137 mcg (0.1 %) nasal spray 1-2 SEN BID PRN      cetirizine (ZYRTEC) 10 MG tablet Take 10 mg by mouth.      clonazePAM (KLONOPIN) 0.5 MG tablet Take 0.5 mg by mouth 2 (two) times daily as needed.      d-mannose 500 mg Cap Take by mouth.      estrogens,esterified,-methyltestosterone 1.25-2.5mg (ESTRATEST) per tablet Take by mouth.      guselkumab (TREMFYA) 100 mg/mL AtIn 100 mg SQ every 4 wks x 2 doses then every 8 weeks thereafter. 2 each 0    guselkumab (TREMFYA) 100 mg/mL AtIn Inject 100 mg into the skin every 8 weeks. 1 each 3    hydroCHLOROthiazide (HYDRODIURIL) 25 MG tablet Take 25 mg by mouth.      levocetirizine (XYZAL) 5 MG tablet Take 5 mg by mouth every evening.      lisdexamfetamine (VYVANSE) 30 MG capsule       MAGNESIUM GLYCINATE ORAL Take by mouth.      methen-m.blue-s.phos-phsal-hyo (URIBEL) 118-10-40.8-36 mg Cap Take 1 capsule by mouth 4 (four) times daily as needed (IC). 120 capsule 2    naltrexone capsule 7 mg qhs 90 capsule 1    ondansetron (ZOFRAN-ODT) 4 MG TbDL Take 4 mg by mouth daily as needed.      pantoprazole (PROTONIX) 20 MG tablet Take 1 tablet (20 mg total) by mouth once daily. 30 tablet 5    pregabalin (LYRICA) 100 MG capsule 75mg      pregabalin (LYRICA) 150 MG capsule Take 1 capsule by mouth every evening.      rizatriptan (MAXALT) 10 MG tablet Take by mouth.      triamcinolone acetonide 0.1% (KENALOG) 0.1 % cream AAA bid prn rash of arms. Moderate steroid. 80 g 0    VIIBRYD 10 mg Tab tablet Take 10 mg by mouth once daily.      hydroCHLOROthiazide (HYDRODIURIL) 12.5 MG Tab Take 12.5 mg by mouth every morning.       No current facility-administered medications on file prior to visit.       /70 (BP  "Location: Right arm, Patient Position: Sitting, BP Method: Large (Manual))   Pulse 80   Ht 5' 8" (1.727 m)   Wt 126.4 kg (278 lb 10.6 oz)   BMI 42.37 kg/m²   Body mass index is 42.37 kg/m².  Physical Exam  Constitutional:       General: She is not in acute distress.  HENT:      Head: Normocephalic.   Neurological:      General: No focal deficit present.      Mental Status: She is alert.   Psychiatric:         Mood and Affect: Mood normal.         Judgment: Judgment normal.       Labs: Pertinent labs reviewed.  CRC Screening:     Assessment:   1. Epigastric pain    2. Dysphagia, unspecified type    3. Nausea    May be depression as etiology of GI symptoms.     Recommendations:   - Metmaucil daily  - unable to get EGD 2/2 financial burden.   - will get US abdomen and UGI series.   - continue pantoprazole     Epigastric pain  -     US Abdomen Limited; Future; Expected date: 04/24/2024  -     FL Upper GI; Future; Expected date: 04/24/2024    Dysphagia, unspecified type  -     US Abdomen Limited; Future; Expected date: 04/24/2024  -     FL Upper GI; Future; Expected date: 04/24/2024    Nausea  -     US Abdomen Limited; Future; Expected date: 04/24/2024  -     [place] Upper GI; Future; Expected date: 04/24/2024    Return to Clinic:  Follow up to be determined after results/ procedure(s).    Thank you for the opportunity to participate in the care of this patient.  BREANNA Aguilar        "

## 2024-04-25 ENCOUNTER — PATIENT MESSAGE (OUTPATIENT)
Dept: UROLOGY | Facility: CLINIC | Age: 39
End: 2024-04-25
Payer: COMMERCIAL

## 2024-04-25 ENCOUNTER — PATIENT MESSAGE (OUTPATIENT)
Dept: RHEUMATOLOGY | Facility: CLINIC | Age: 39
End: 2024-04-25
Payer: COMMERCIAL

## 2024-04-25 LAB — BACTERIA UR CULT: ABNORMAL

## 2024-04-25 RX ORDER — NITROFURANTOIN 25; 75 MG/1; MG/1
100 CAPSULE ORAL 2 TIMES DAILY
Qty: 20 CAPSULE | Refills: 0 | Status: ON HOLD | OUTPATIENT
Start: 2024-04-25 | End: 2024-05-10 | Stop reason: HOSPADM

## 2024-04-29 ENCOUNTER — TELEPHONE (OUTPATIENT)
Dept: UROLOGY | Facility: CLINIC | Age: 39
End: 2024-04-29
Payer: COMMERCIAL

## 2024-04-29 ENCOUNTER — PATIENT MESSAGE (OUTPATIENT)
Dept: UROLOGY | Facility: CLINIC | Age: 39
End: 2024-04-29
Payer: COMMERCIAL

## 2024-04-29 RX ORDER — METHENAMINE, SODIUM PHOSPHATE, MONOBASIC, MONOHYDRATE, PHENYL SALICYLATE, METHYLENE BLUE, AND HYOSCYAMINE SULFATE 120; 40.8; 36.2; 10.8; .12 MG/1; MG/1; MG/1; MG/1; MG/1
1 TABLET ORAL 4 TIMES DAILY PRN
Qty: 60 TABLET | Refills: 1 | Status: SHIPPED | OUTPATIENT
Start: 2024-04-29 | End: 2024-05-08

## 2024-05-01 ENCOUNTER — HOSPITAL ENCOUNTER (OUTPATIENT)
Dept: RADIOLOGY | Facility: HOSPITAL | Age: 39
Discharge: HOME | End: 2024-05-01
Attending: NURSE PRACTITIONER
Payer: COMMERCIAL

## 2024-05-01 DIAGNOSIS — R11.0 NAUSEA: ICD-10-CM

## 2024-05-01 DIAGNOSIS — R10.13 EPIGASTRIC PAIN: Primary | ICD-10-CM

## 2024-05-01 DIAGNOSIS — R10.13 EPIGASTRIC PAIN: ICD-10-CM

## 2024-05-01 DIAGNOSIS — R13.10 DYSPHAGIA, UNSPECIFIED TYPE: ICD-10-CM

## 2024-05-01 DIAGNOSIS — K29.70 GASTRITIS, PRESENCE OF BLEEDING UNSPECIFIED, UNSPECIFIED CHRONICITY, UNSPECIFIED GASTRITIS TYPE: ICD-10-CM

## 2024-05-01 PROCEDURE — 76705 ECHO EXAM OF ABDOMEN: CPT | Mod: 26,,, | Performed by: RADIOLOGY

## 2024-05-01 PROCEDURE — 25500020 PHARM REV CODE 255: Performed by: NURSE PRACTITIONER

## 2024-05-01 PROCEDURE — A9698 NON-RAD CONTRAST MATERIALNOC: HCPCS | Performed by: NURSE PRACTITIONER

## 2024-05-01 PROCEDURE — 76705 ECHO EXAM OF ABDOMEN: CPT | Mod: TC

## 2024-05-01 PROCEDURE — 74240 X-RAY XM UPR GI TRC 1CNTRST: CPT | Mod: TC

## 2024-05-01 PROCEDURE — 74240 X-RAY XM UPR GI TRC 1CNTRST: CPT | Mod: 26,,, | Performed by: STUDENT IN AN ORGANIZED HEALTH CARE EDUCATION/TRAINING PROGRAM

## 2024-05-01 RX ORDER — PANTOPRAZOLE SODIUM 40 MG/1
40 TABLET, DELAYED RELEASE ORAL DAILY
Qty: 30 TABLET | Refills: 11 | Status: SHIPPED | OUTPATIENT
Start: 2024-05-01 | End: 2025-05-01

## 2024-05-01 RX ADMIN — BARIUM SULFATE 137 ML: 980 POWDER, FOR SUSPENSION ORAL at 10:05

## 2024-05-01 RX ADMIN — BARIUM SULFATE 176 G: 960 POWDER, FOR SUSPENSION ORAL at 10:05

## 2024-05-05 ENCOUNTER — PATIENT MESSAGE (OUTPATIENT)
Dept: GASTROENTEROLOGY | Facility: CLINIC | Age: 39
End: 2024-05-05
Payer: COMMERCIAL

## 2024-05-05 DIAGNOSIS — K29.70 GASTRITIS, PRESENCE OF BLEEDING UNSPECIFIED, UNSPECIFIED CHRONICITY, UNSPECIFIED GASTRITIS TYPE: Primary | ICD-10-CM

## 2024-05-06 RX ORDER — SUCRALFATE 1 G/1
1 TABLET ORAL
Qty: 120 TABLET | Refills: 0 | Status: SHIPPED | OUTPATIENT
Start: 2024-05-06

## 2024-05-07 ENCOUNTER — PATIENT MESSAGE (OUTPATIENT)
Dept: GASTROENTEROLOGY | Facility: CLINIC | Age: 39
End: 2024-05-07
Payer: COMMERCIAL

## 2024-05-07 DIAGNOSIS — R11.0 NAUSEA: ICD-10-CM

## 2024-05-07 DIAGNOSIS — R13.10 DYSPHAGIA, UNSPECIFIED TYPE: ICD-10-CM

## 2024-05-07 DIAGNOSIS — K29.70 GASTRITIS, PRESENCE OF BLEEDING UNSPECIFIED, UNSPECIFIED CHRONICITY, UNSPECIFIED GASTRITIS TYPE: Primary | ICD-10-CM

## 2024-05-07 DIAGNOSIS — R93.3 ABNORMAL FINDINGS ON DIAGNOSTIC IMAGING OF DIGESTIVE SYSTEM: ICD-10-CM

## 2024-05-08 ENCOUNTER — HOSPITAL ENCOUNTER (OUTPATIENT)
Dept: PREADMISSION TESTING | Facility: HOSPITAL | Age: 39
Discharge: HOME | End: 2024-05-08
Attending: FAMILY MEDICINE
Payer: COMMERCIAL

## 2024-05-08 ENCOUNTER — TELEPHONE (OUTPATIENT)
Dept: UROLOGY | Facility: CLINIC | Age: 39
End: 2024-05-08
Payer: COMMERCIAL

## 2024-05-08 DIAGNOSIS — K29.70 GASTRITIS, PRESENCE OF BLEEDING UNSPECIFIED, UNSPECIFIED CHRONICITY, UNSPECIFIED GASTRITIS TYPE: Primary | ICD-10-CM

## 2024-05-08 DIAGNOSIS — R13.10 DYSPHAGIA, UNSPECIFIED TYPE: ICD-10-CM

## 2024-05-08 RX ORDER — METHENAMINE, SODIUM PHOSPHATE, MONOBASIC, MONOHYDRATE, PHENYL SALICYLATE, METHYLENE BLUE, AND HYOSCYAMINE SULFATE 118; 40.8; 36; 10; .12 MG/1; MG/1; MG/1; MG/1; MG/1
1 CAPSULE ORAL 4 TIMES DAILY PRN
Qty: 120 CAPSULE | Refills: 0 | Status: SHIPPED | OUTPATIENT
Start: 2024-05-08 | End: 2024-06-07

## 2024-05-09 PROBLEM — R13.12 OROPHARYNGEAL DYSPHAGIA: Status: ACTIVE | Noted: 2024-05-09

## 2024-05-10 ENCOUNTER — HOSPITAL ENCOUNTER (OUTPATIENT)
Facility: HOSPITAL | Age: 39
Discharge: HOME | End: 2024-05-10
Attending: INTERNAL MEDICINE | Admitting: INTERNAL MEDICINE
Payer: COMMERCIAL

## 2024-05-10 ENCOUNTER — ANESTHESIA (OUTPATIENT)
Dept: ENDOSCOPY | Facility: HOSPITAL | Age: 39
End: 2024-05-10
Payer: COMMERCIAL

## 2024-05-10 ENCOUNTER — ANESTHESIA EVENT (OUTPATIENT)
Dept: ENDOSCOPY | Facility: HOSPITAL | Age: 39
End: 2024-05-10
Payer: COMMERCIAL

## 2024-05-10 VITALS
SYSTOLIC BLOOD PRESSURE: 122 MMHG | TEMPERATURE: 98 F | DIASTOLIC BLOOD PRESSURE: 78 MMHG | HEIGHT: 68 IN | OXYGEN SATURATION: 95 % | HEART RATE: 88 BPM | BODY MASS INDEX: 41.83 KG/M2 | WEIGHT: 276 LBS | RESPIRATION RATE: 18 BRPM

## 2024-05-10 DIAGNOSIS — R13.12 OROPHARYNGEAL DYSPHAGIA: ICD-10-CM

## 2024-05-10 DIAGNOSIS — R10.13 EPIGASTRIC PAIN: Primary | ICD-10-CM

## 2024-05-10 PROCEDURE — 25000003 PHARM REV CODE 250: Performed by: INTERNAL MEDICINE

## 2024-05-10 PROCEDURE — 88305 TISSUE EXAM BY PATHOLOGIST: CPT | Performed by: PATHOLOGY

## 2024-05-10 PROCEDURE — 37000009 HC ANESTHESIA EA ADD 15 MINS: Performed by: INTERNAL MEDICINE

## 2024-05-10 PROCEDURE — 25000003 PHARM REV CODE 250: Performed by: NURSE ANESTHETIST, CERTIFIED REGISTERED

## 2024-05-10 PROCEDURE — 88305 TISSUE EXAM BY PATHOLOGIST: CPT | Mod: 26,,, | Performed by: PATHOLOGY

## 2024-05-10 PROCEDURE — 43239 EGD BIOPSY SINGLE/MULTIPLE: CPT | Mod: ,,, | Performed by: INTERNAL MEDICINE

## 2024-05-10 PROCEDURE — 37000008 HC ANESTHESIA 1ST 15 MINUTES: Performed by: INTERNAL MEDICINE

## 2024-05-10 PROCEDURE — 27201012 HC FORCEPS, HOT/COLD, DISP: Performed by: INTERNAL MEDICINE

## 2024-05-10 PROCEDURE — 63600175 PHARM REV CODE 636 W HCPCS: Performed by: NURSE ANESTHETIST, CERTIFIED REGISTERED

## 2024-05-10 PROCEDURE — 43239 EGD BIOPSY SINGLE/MULTIPLE: CPT | Performed by: INTERNAL MEDICINE

## 2024-05-10 RX ORDER — SODIUM CHLORIDE, SODIUM LACTATE, POTASSIUM CHLORIDE, CALCIUM CHLORIDE 600; 310; 30; 20 MG/100ML; MG/100ML; MG/100ML; MG/100ML
INJECTION, SOLUTION INTRAVENOUS CONTINUOUS PRN
Status: DISCONTINUED | OUTPATIENT
Start: 2024-05-10 | End: 2024-05-10

## 2024-05-10 RX ORDER — DEXTROMETHORPHAN/PSEUDOEPHED 2.5-7.5/.8
DROPS ORAL
Status: COMPLETED | OUTPATIENT
Start: 2024-05-10 | End: 2024-05-10

## 2024-05-10 RX ORDER — SODIUM CHLORIDE 9 MG/ML
INJECTION, SOLUTION INTRAVENOUS CONTINUOUS
Status: DISCONTINUED | OUTPATIENT
Start: 2024-05-10 | End: 2024-05-10 | Stop reason: HOSPADM

## 2024-05-10 RX ORDER — PROPOFOL 10 MG/ML
VIAL (ML) INTRAVENOUS
Status: DISCONTINUED | OUTPATIENT
Start: 2024-05-10 | End: 2024-05-10

## 2024-05-10 RX ORDER — LIDOCAINE HYDROCHLORIDE 20 MG/ML
INJECTION, SOLUTION EPIDURAL; INFILTRATION; INTRACAUDAL; PERINEURAL
Status: DISCONTINUED | OUTPATIENT
Start: 2024-05-10 | End: 2024-05-10

## 2024-05-10 RX ADMIN — PROPOFOL 30 MG: 10 INJECTION, EMULSION INTRAVENOUS at 01:05

## 2024-05-10 RX ADMIN — PROPOFOL 50 MG: 10 INJECTION, EMULSION INTRAVENOUS at 01:05

## 2024-05-10 RX ADMIN — SODIUM CHLORIDE, SODIUM LACTATE, POTASSIUM CHLORIDE, AND CALCIUM CHLORIDE: 600; 310; 30; 20 INJECTION, SOLUTION INTRAVENOUS at 01:05

## 2024-05-10 RX ADMIN — GLYCOPYRROLATE 0.2 MG: 0.2 INJECTION, SOLUTION INTRAMUSCULAR; INTRAVITREAL at 01:05

## 2024-05-10 RX ADMIN — LIDOCAINE HYDROCHLORIDE 100 MG: 20 INJECTION, SOLUTION EPIDURAL; INFILTRATION; INTRACAUDAL; PERINEURAL at 01:05

## 2024-05-10 NOTE — ANESTHESIA PREPROCEDURE EVALUATION
05/10/2024  Bee Condon is a 38 y.o., female.      Pre-op Assessment    I have reviewed the Patient Summary Reports.     I have reviewed the Nursing Notes. I have reviewed the NPO Status.   I have reviewed the Medications.     Review of Systems  Anesthesia Hx:  No problems with previous Anesthesia             Denies Family Hx of Anesthesia complications.    Denies Personal Hx of Anesthesia complications.                    Social:  No Alcohol Use, Non-Smoker       Hematology/Oncology:  Hematology Normal   Oncology Normal                                   Cardiovascular:      Denies Hypertension.   Denies MI.     Denies CABG/stent.     Denies CHF.                                 Pulmonary:    Denies COPD.  Denies Asthma.     Denies Sleep Apnea.                Renal/:     Interstitial cystitis             Hepatic/GI:      Denies GERD. Denies Liver Disease.  Denies Hepatitis. Oropharyngeal dysphagia          Musculoskeletal:     Polyarticular psoriatic arthritis            Neurological:    Denies CVA.    Denies Seizures.    Fibromyalgia                            Endocrine:  Denies Diabetes. Denies Hypothyroidism.  Denies Hyperthyroidism.       Morbid Obesity / BMI > 40  Psych:   anxiety depression            Physical Exam    Airway:  Mallampati: II   Mouth Opening: Normal    Dental:  Intact    Chest/Lungs:  Normal Respiratory Rate, Clear to auscultation    Heart:  Rate: Normal  Rhythm: Regular Rhythm    Anesthesia Plan  Type of Anesthesia, risks & benefits discussed:    Anesthesia Type: MAC  Intra-op Monitoring Plan: Standard ASA Monitors  Induction:  IV  Informed Consent: Informed consent signed with the Patient and all parties understand the risks and agree with anesthesia plan.  All questions answered.   ASA Score: 3  Day of Surgery Review of History & Physical: H&P Update referred to the  surgeon/provider.    Ready For Surgery From Anesthesia Perspective.   .

## 2024-05-10 NOTE — TRANSFER OF CARE
"Anesthesia Transfer of Care Note    Patient: Bee Condon    Procedure(s) Performed: Procedure(s) (LRB):  EGD (ESOPHAGOGASTRODUODENOSCOPY) with biopsies (N/A)    Patient location: GI    Anesthesia Type: MAC    Transport from OR: Transported from OR on room air with adequate spontaneous ventilation    Post pain: adequate analgesia    Post assessment: no apparent anesthetic complications and tolerated procedure well    Post vital signs: stable    Level of consciousness: responds to stimulation    Nausea/Vomiting: no nausea/vomiting    Complications: none    Transfer of care protocol was followed      Last vitals: Visit Vitals  /64 (BP Location: Left arm, Patient Position: Sitting)   Pulse 69   Temp 36.9 °C (98.4 °F) (Skin)   Resp 12   Ht 5' 8" (1.727 m)   Wt 125.2 kg (276 lb)   SpO2 97%   Breastfeeding No   BMI 41.97 kg/m²     "

## 2024-05-10 NOTE — ANESTHESIA POSTPROCEDURE EVALUATION
Anesthesia Post Evaluation    Patient: Bee Condon    Procedure(s) Performed: Procedure(s) (LRB):  EGD (ESOPHAGOGASTRODUODENOSCOPY) with biopsies (N/A)    Final Anesthesia Type: MAC      Patient location during evaluation: GI PACU  Patient participation: Yes- Able to Participate  Level of consciousness: awake and alert  Post-procedure vital signs: reviewed and stable  Pain management: adequate  Airway patency: patent    PONV status at discharge: No PONV  Anesthetic complications: no      Cardiovascular status: blood pressure returned to baseline, hemodynamically stable and stable  Respiratory status: unassisted, room air and spontaneous ventilation  Hydration status: euvolemic  Follow-up not needed.              Vitals Value Taken Time   /78 05/10/24 1405   Temp 36.7 °C (98 °F) 05/10/24 1350   Pulse 88 05/10/24 1405   Resp 18 05/10/24 1405   SpO2 95 % 05/10/24 1405         Event Time   Out of Recovery 14:21:38         Pain/Horace Score: Horace Score: 10 (5/10/2024  2:05 PM)

## 2024-05-10 NOTE — INTERVAL H&P NOTE
The patient has been examined and the H&P has been reviewed:    I concur with the findings and changes have been noted since the H&P was written: endorses dysphagia, early satiety for some time but only a 7 lb weight loss in the last 6 weeks. Epigastric pain. No previous EGD.  at bedside.     Anesthesia/Surgery risks, benefits and alternative options discussed and understood by patient/family.    The risks, benefits and alternatives of the procedure were discussed with the patient in detail. This discussion was had in the presence of her . The risks include, risks of adverse reaction to sedation requiring the use of reversal agents, bleeding requiring blood transfusion, perforation requiring surgical intervention and technical failure. Other risks include aspiration leading to respiratory distress and respiratory failure resulting in endotracheal intubation and mechanical ventilation including death. If anesthesia is being utilized for this procedure, it is up to the anesthesiologist to determine airway safety including elective endotracheal intubation. Questions were answered, they agree to proceed. There was no language barriers.          Active Hospital Problems    Diagnosis  POA    *Oropharyngeal dysphagia [R13.12]  Yes      Resolved Hospital Problems   No resolved problems to display.

## 2024-05-10 NOTE — PROVATION PATIENT INSTRUCTIONS
Discharge Summary/Instructions after an Endoscopic Procedure  Patient Name: Bee Condon  Patient MRN: [id number]  Patient YOB: 1985  Friday, May 10, 2024 Jamilah Horta MD  Dear patient,  As a result of recent federal legislation (The Federal Cures Act), you may   receive lab or pathology results from your procedure in your [facility]   account before your physician is able to contact you. Your physician or   their representative will relay the results to you with their   recommendations at their soonest availability.  Thank you,  RESTRICTIONS:  During your procedure today, you received medications for sedation.  These   medications may affect your judgment, balance and coordination.  Therefore,   for 24 hours, you have the following restrictions:   - DO NOT drive a car, operate machinery, make legal/financial decisions,   sign important papers or drink alcohol.    ACTIVITY:  Today: no heavy lifting, straining or running due to procedural   sedation/anesthesia.  The following day: return to full activity including work.  DIET:  Eat and drink normally unless instructed otherwise.     TREATMENT FOR COMMON SIDE EFFECTS:  - Mild abdominal pain, nausea, belching, bloating or excessive gas:  rest,   eat lightly and use a heating pad.  - Sore Throat: treat with throat lozenges and/or gargle with warm salt   water.  - Because air was used during the procedure, expelling large amounts of air   from your rectum or belching is normal.  - If a bowel prep was taken, you may not have a bowel movement for 1-3 days.    This is normal.  SYMPTOMS TO WATCH FOR AND REPORT TO YOUR PHYSICIAN:  1. Abdominal pain or bloating, other than gas cramps.  2. Chest pain.  3. Back pain.  4. Signs of infection such as: chills or fever occurring within 24 hours   after the procedure.  5. Rectal bleeding, which would show as bright red, maroon, or black stools.   (A tablespoon of blood from the rectum is not serious, especially if    hemorrhoids are present.)  6. Vomiting.  7. Weakness or dizziness.  GO DIRECTLY TO THE NEAREST EMERGENCY ROOM IF YOU HAVE ANY OF THE FOLLOWING:      Difficulty breathing              Chills and/or fever over 101 F   Persistent vomiting and/or vomiting blood   Severe abdominal pain   Severe chest pain   Black, tarry stools   Bleeding- more than one tablespoon   Any other symptom or condition that you feel may need urgent attention  Your doctor recommends these additional instructions:  If any biopsies were taken, your doctors clinic will contact you in 1 to 2   weeks with any results.  - Discharge patient to home (with escort).   - Resume previous diet.   - Continue present medications.   - Observe patient's clinical course.   - Can consider a MBBS with speech for complaints of dysphagia.   - Consider ultrasound of the abdomen for post prandial nausea for biliary   pathology.   - Return to nurse practitioner with LACHELLE Corbin.  For questions, problems or results please call your physician Jamilah Horta MD at Work:  [phone]  If you have any questions about the above instructions, call the GI   department at [phone] or call the endoscopy unit at [phone]   from 7am until 3 pm.  [facility] [place], EMERGENCY ROOM PHONE NUMBER:   [phone]  IF A COMPLICATION OR EMERGENCY SITUATION ARISES AND YOU ARE UNABLE TO REACH   YOUR PHYSICIAN - GO DIRECTLY TO THE EMERGENCY ROOM.  I have read or have had read to me these discharge instructions for my   procedure and have received a written copy.  I understand these   instructions and will follow-up with my physician if I have any questions.     __________________________________       _____________________________________  Nurse Signature                                          Patient/Designated   Responsible Party Signature  MD Jamilah Mina MD  5/10/2024 1:53:58 PM  This report has been verified and  signed electronically.  Dear patient,  As a result of recent federal legislation (The Federal Cures Act), you may   receive lab or pathology results from your procedure in your [facility]   account before your physician is able to contact you. Your physician or   their representative will relay the results to you with their   recommendations at their soonest availability.  Thank you,  PR[employer]ATION

## 2024-05-16 ENCOUNTER — PATIENT MESSAGE (OUTPATIENT)
Dept: GASTROENTEROLOGY | Facility: CLINIC | Age: 39
End: 2024-05-16
Payer: COMMERCIAL

## 2024-05-16 DIAGNOSIS — R13.12 OROPHARYNGEAL DYSPHAGIA: Primary | ICD-10-CM

## 2024-05-16 DIAGNOSIS — R10.13 EPIGASTRIC PAIN: ICD-10-CM

## 2024-05-16 LAB
FINAL PATHOLOGIC DIAGNOSIS: NORMAL
GROSS: NORMAL
Lab: NORMAL

## 2024-05-16 NOTE — PROGRESS NOTES
The biopsies of your stomach show no signs of infection. Ms. Latrice Rodriguez has taken an unexpected medical leave therefore, I will order the ultrasound of the abdomen I recommended and the modified barium swallow study with the speech pathologist to look into your difficulty swallowing since your endoscopy was normal. Please contact the radiology department to schedule your ultrasound at [phone]. You will be hearing from Speech Therapy to schedule your swallow study.

## 2024-05-29 ENCOUNTER — HOSPITAL ENCOUNTER (OUTPATIENT)
Dept: RADIOLOGY | Facility: HOSPITAL | Age: 39
Discharge: HOME | End: 2024-05-29
Attending: INTERNAL MEDICINE
Payer: COMMERCIAL

## 2024-05-29 ENCOUNTER — CLINICAL SUPPORT (OUTPATIENT)
Dept: SPEECH THERAPY | Facility: HOSPITAL | Age: 39
End: 2024-05-29
Attending: INTERNAL MEDICINE
Payer: COMMERCIAL

## 2024-05-29 DIAGNOSIS — R13.12 OROPHARYNGEAL DYSPHAGIA: ICD-10-CM

## 2024-05-29 PROCEDURE — 92610 EVALUATE SWALLOWING FUNCTION: CPT

## 2024-05-29 PROCEDURE — 25500020 PHARM REV CODE 255: Performed by: INTERNAL MEDICINE

## 2024-05-29 PROCEDURE — 74230 X-RAY XM SWLNG FUNCJ C+: CPT | Mod: TC

## 2024-05-29 PROCEDURE — 74230 X-RAY XM SWLNG FUNCJ C+: CPT | Mod: 26,,, | Performed by: RADIOLOGY

## 2024-05-29 PROCEDURE — 92611 MOTION FLUOROSCOPY/SWALLOW: CPT

## 2024-05-29 PROCEDURE — A9698 NON-RAD CONTRAST MATERIALNOC: HCPCS | Performed by: INTERNAL MEDICINE

## 2024-05-29 RX ADMIN — BARIUM SULFATE 20 ML: 0.81 POWDER, FOR SUSPENSION ORAL at 01:05

## 2024-05-29 NOTE — PROGRESS NOTES
See MBSS.     Stephanie Bullard, FRAN-SLP, CCC-SLP, CBIS   Speech Language Pathologist   Certified Brain Injury Specialist   5/29/2024

## 2024-05-30 NOTE — PLAN OF CARE
[facility]   Outpatient MODIFIED BARIUM SWALLOW STUDY    Date: 5/29/2024     Name: Bee Condon   MRN: [id number]    Therapy Diagnosis: WFL oral and pharyngeal phases of the swallow    Physician: Jamilah Horta MD  Physician Orders: Fl Modified Barium Swallow Speech/SLP Video Swallow  Medical Diagnosis from Referral: R13.12 (ICD-10-CM) - Oropharyngeal dysphagia     Date of Evaluation:  5/29/2024    Procedure Min.   Swallow and Oral Function Evaluation   10   Fl Modified Barium Swallow Speech  15     Time in: 1:00 PM   Time out: 1:25 PM   Total Billable Time: 25 minutes    Radiation time: 1.48 minutes     Precautions: Standard  Subjective   History of Current Condition: Bee Condon is a 38 y.o. female here today for Modified Barium Swallow Study (MBSS). Patient has been followed by GI for globus sensation, epigastric pain, and symptoms of reflux. She has completed an EGD with no significant findings. She was referred for an MBSS due to continued symptoms of dysphagia.     In consideration of the interrelationships between body systems and swallowing, History was provided by patient and/or taken from chart review. The following are medical conditions present in the patient's history which can result in or be attributed to dysphagia:  Respiratory None noted in this category   Cardiovascular None noted in this category   Digestive esophageal dysmotility    Infections  None noted in this category   Urinary None noted in this category   Endocrine None noted in this category   Nervous None noted in this category   Skeletal None noted in this category   Immune   Polyarticular psoriatic arthritis      Cancer None noted in this category   Psychiatric  Depression   Congenital  None noted in this category   Other None noted in this category     -Current diet at home: Thin liquids/Regular consistencies   -Recommended diet from previous study: N/A  -Therapy received: N/A    The following  "observations were made:   -Mental status: Alert and Cooperative  -Factors affecting performance: no difficulties participating in the study  -Feeding Method: independent in self-feeding    Respiratory Status:   -Respiratory Status: room air    Past Medical History: Bee Condon  has a past medical history of Anxiety, Depression, and Interstitial cystitis (2019).  Bee Condon  has a past surgical history that includes  section; Tubal ligation; and Esophagogastroduodenoscopy (N/A, 5/10/2024).    Medical Hx and Allergies:   Review of patient's allergies indicates:   Allergen Reactions    Iodine     Oxycodone-acetaminophen Hives and Itching    Shellfish containing products        Relevant Imagin2024: FL Upper GI with impressions per Stephanie Gross MD:   "Impression:  Largely unremarkable upper GI examination noting mildly prominent gastric rugal folds, which could indicate gastritis in the appropriate setting."    5/10/2024: Upper GI endoscopy with impressions per Jamilah Horta MD:   Impression:            " - No endoscopic esophageal abnormality to explain patient's dysphagia.    - No gross lesions in the entire stomach. Biopsied.    - Normal examined duodenum.   Recommendation:          - Discharge patient to home (with escort).    - Resume previous diet.   - Continue present medications.    - Observe patient's clinical course.    - Can consider a MBBS with speech for complaints of dysphagia.    - Consider ultrasound of the abdomen for post prandial nausea for biliary pathology.   - Return to nurse practitioner with LACHELLE Corbin. "    Objective     Modified Barium Swallow Study  Purpose: to evaluate anatomy and physiology of the oropharyngeal swallow, to determine effectiveness of rehabilitation strategies, and to determine diet consistency and intervention recommendations. The study was performed using the "Gold Standard" of 30 fps with as low as reasonably " achievable (ALARA) exposure.     The patient was seen in radiology seated in High Whittaker's position in a video imaging chair for lateral views of the larynx and an A/P view. The study was conducted using Varibar thin liquid (IDDSI 0), Varibar nectar liquid (IDDSI 2), Varibar pudding (IDDSI 4), Peaches mixed with Varibar thin liquid (IDDSI 6/0), and solid coated in Varibar pudding (IDDSI 7). She tolerated the procedure well.    A cranial nerve evaluation revealed:   Cranial Nerve Examination  Cranial Nerve 5: Trigeminal Nerve  Motor Jaw Posture at rest: Closed  Mandible Elevation/Depression: WFL  Mandible lateralization: WFL  Abnormal movement: absent Interpretation: Intact bilaterally    Sensory Forehead: WFL  Cheek: WFL  Jaw: WFL  Facial Pain: None noted Interpretation: Intact bilaterally      Cranial Nerve 7: Facial Nerve  Motor Facial Symmetry: WNL  Wrinkle Forehead: WFL  Close eyes tightly: WFL  Labial Protrusion: WFL  Labial Retraction: WFL  Buccal Strength with Labial Seal: WFL  Abnormal movement: absent Interpretation: Intact bilaterally    Sensory Formal testing not completed.       Cranial Nerves IX and X: Glossopharyngeal and Vagus Nerves  Motor Palatal Symmetry (Rest): WNL  Palatal Symmetry (Movement): WNL  Cough: Perceptually strong  Voice Prior to PO intake: Clear  Resonance: Normal  Abnormal movement: absent Interpretation: Intact bilaterally      Cranial Nerve XII: Hypoglossal Nerve  Motor Tongue at rest: WNL  Lingual Protrusion: WNL  Lingual Protrusion against Resistance: WNL  Lingual Lateralization: WNL  Abnormal movement: absent Interpretation: Intact bilaterally      Other information:  Volitional Swallow: Able to palpate laryngeal rise  Mucosal Quality: No abnormal findings  Secretion Management: no overt deficits noted/observed  Dentition: Good condition for speech and mastication        Consistency  Presentation  Findings Strategy Attempted Rosenbeck's Penetration/Aspiration Scale (PAS)   Thin  (IDDSI 0) Method: Self-fed    Volume: cup sip x4 sequential sips    Projection: lateral view; AP view  Oral phase: WFL    Pharyngeal phase: Trace residue at the base of tongue and valleculae that clears with a spontaneous sequential swallow    Esophageal screen: Aerophagia; retrograde flow below the UES   Best: (1) Material does not enter the airway    Worst: (1) Material does not enter the airway     Nectar thick (mildly thick/IDDSI 2) Method:Self-fed    Volume: cup sip x1     Projection: lateral view Oral phase: WFL    Pharyngeal phase: WFL      Best: (1) Material does not enter the airway    Worst: (1) Material does not enter the airway     Puree (extremely thick/ IDDSI 4) Method: Self-fed    Volume: tbsp bite x2     Projection: lateral view Oral phase: WFL    Pharyngeal phase: WFL        Best: (1) Material does not enter the airway    Worst: (1) Material does not enter the airway     Solid (regular/ IDDSI 7) Method: Self-fed    Volume: bite x3     Projection: lateral view, AP view Oral phase: WFL    Pharyngeal phase: WFL    Esophageal screen: Retention observed at proximal-medial esophagus; clears by 60 seconds   Best: (1) Material does not enter the airway    Worst: (1) Material does not enter the airway     Mixed consistency (thin/ IDDSI 0 + soft and bite sized/ IDDSI 6) Not completed due to allergies (patient allergic to pineapple)   Barium tablet  Method: Self-fed    Volume: single tablet with water bolus(es)    Projection: AP view Timely and efficient oropharyngeal clearance         Treatment   Treatment Time In: n/a   Treatment Time Out: n/a  Total Treatment Time: 0 mins  Patient educated regarding results and recommendations of the evaluation. See the recommendations section below.    Education: Plan of Care and anatomy and physiology of swallow mechanism as it relates to MBSS findings and recommendations were discussed with the patient. Patient expressed understanding. All questions were answered.      Assessment     Bee Condon is a 38 y.o. female referred for Modified Barium Swallow Study with a medical diagnosis of Oropharyngeal dysphagia. The patient presents with oral and pharyngeal phases of the swallow WFL as determined by the Dysphagia Outcome and Severity Scale (CHRIS). Level 7: Normal in all situations.    Modified Barium Swallow Study (MBSS) revealed oral phase characterized by adequate lingual and labial strength and range of motion for tongue control, bolus preparation and transport. Lip closure was adequate with no labial escape. Bolus prep and mastication was timely and efficient. Lingual motion was brisk for adequate bolus transport. There was no significant oral residue. The swallow was initiated when the head of the bolus passed the ramus of the mandible.    Pharyngeal phase characterized by timely initiation of swallow across consistencies. The soft palate elevated for complete closure of the velopharyngeal port. During pharyngeal swallow, adequate base of tongue retraction, anterior hyoid excursion, laryngeal elevation, and pharyngeal stripping wave resulted in complete epiglottic inversion and UES opening. No penetration, aspiration, or significant pharyngeal residue was observed in today's study.     Robust Esophageal Screening Test (REST) was used to screen esophageal phase of swallow (Dillon et al, 2021) in today's study, completed in anterior/posterior view, with intake of barium coated solid, large self-regulated sips of liquid, and barium tablet. Screening significant for dysmotility with aerophagia, retrograde flow below the UES, and retention of solid bolus that clears by 1 minute. Patient advised to continue follow up with GI.     Impressions: Patient presents with WFL oral and pharyngeal phases of the swallow.  In consideration of the Dynamic Imaging Grade of Swallowing Toxicity (DIGEST) (Riley et al, 2017), patient presents with preserved safety of swallow and  preserved efficiency of swallow. Patient appears to be at low risk for aspiration related PNA in consideration of three pillars of aspiration pneumonia (Imer, 2005) including preserved oral health status, overall health/immune status, and laryngeal vestibule closure/severity of dysphagia. However, unable to assess risk related to aspiration pneumonia cause by the aspiration of gastric content. Patient would not benefit from swallowing therapy at this time.     Functional Oral Intake Scale (FOIS)  The Functional Oral Intake Scale (FOIS) is an ordinal scale that is used to assess the current status and meaningful change in the oral intake. FOIS levels include:    TUBE DEPENDENT (levels 1-3) 1. No oral intake  2. Tube dependent with minimal/inconsistent oral intake  3. Tube supplements with consistent oral intake      TOTAL ORAL INTAKE (levels 4-7) 4. Total oral intake of a single consistency  5. Total oral intake of multiple consistencies requiring special preparation  6. Total oral intake with no special preparation, but must avoid specific foods or liquid items  7. Total oral intake with no restrictions     Patient is currently judged to be at FOIS level 7.      References:  EZIO Willams (2005, March). Pneumonia: Factors Beyond Aspiration. Perspectives in Swallowing and Swallowing Disorders (Dysphagia), 14, 10-16.  Kannan KA, Mae MP, Joanne DA, Lucas JK, Patricia HY, Kit RS, Jason CD, Oscar SY, Warner CP, Marcelo J, Lazarus CL, May A, Awais J, Gigi JW, Leanna HM, Bakari JS. Dynamic Imaging Grade of Swallowing Toxicity (DIGEST): Scale development and validation. Cancer. 2017 Jan 1;123(1):62-70. doi: 10.1002/cncr.92845. Epub 2016 Aug 26. PMID: 74236079; PMCID: YYP8929424.  JAMARCUS Dillon., EZIO Langford, GHAZALA Miller, & MONTRELL Hodge. (2021). Diagnostic Accuracy of an Esophageal Screening Protocol Interpreted by the Speech-Language Pathologist. Dysphagia, 36(6), 2405-9305.  [email or web address]    Recommendations:     Consistency Recommendations:  Thin liquids (IDDSI 0) and Regular consistencies (IDDSI 7).   Risk Management/Swallow Guidelines: use good oral hygiene , sit upright for all PO intake, increase physical mobility as tolerated, and behavioral reflux precautions  Specialist Referrals: Continue follow up with GI   Therapy: Dysphagia therapy is not recommended at this time.  Follow-up exam: Follow up swallow study is not indicated at this time.    Please contact [facility] at [phone] if there are questions re: the above or if we can be of additional service to this patient.      Stephanie Bullard, L-SLP, CCC-SLP, CBIS   Speech Language Pathologist   Certified Brain Injury Specialist   5/30/2024

## 2024-05-31 NOTE — PROGRESS NOTES
Your swallow study was normal. There was no concerns for aspiration and your swallow function is normal. Please follow up with your primary care provider to determine if you need to be referred to a ear, nose and throat (ENT) doctor for your symptoms of feeling as if something is stuck in your throat versus imaging of the head/neck. Continue your acid blocker Protonix 40 mg daily for now and continue to practice anti-reflux measures as previously discussed. To remind you these measure include: avoiding large meals, over eating, late night snacking and eating then lying right down.

## 2024-06-05 ENCOUNTER — PATIENT MESSAGE (OUTPATIENT)
Dept: RHEUMATOLOGY | Facility: CLINIC | Age: 39
End: 2024-06-05
Payer: COMMERCIAL

## 2024-07-09 DIAGNOSIS — K29.70 GASTRITIS, PRESENCE OF BLEEDING UNSPECIFIED, UNSPECIFIED CHRONICITY, UNSPECIFIED GASTRITIS TYPE: ICD-10-CM

## 2024-07-09 RX ORDER — SUCRALFATE 1 G/1
TABLET ORAL
Qty: 120 TABLET | Refills: 0 | Status: SHIPPED | OUTPATIENT
Start: 2024-07-09

## 2024-07-24 ENCOUNTER — LAB VISIT (OUTPATIENT)
Dept: LAB | Facility: HOSPITAL | Age: 39
End: 2024-07-24
Attending: STUDENT IN AN ORGANIZED HEALTH CARE EDUCATION/TRAINING PROGRAM
Payer: COMMERCIAL

## 2024-07-24 ENCOUNTER — OFFICE VISIT (OUTPATIENT)
Dept: RHEUMATOLOGY | Facility: CLINIC | Age: 39
End: 2024-07-24
Payer: COMMERCIAL

## 2024-07-24 VITALS
BODY MASS INDEX: 41.93 KG/M2 | WEIGHT: 276.69 LBS | DIASTOLIC BLOOD PRESSURE: 88 MMHG | HEIGHT: 68 IN | SYSTOLIC BLOOD PRESSURE: 125 MMHG | HEART RATE: 93 BPM

## 2024-07-24 DIAGNOSIS — E66.01 CLASS 3 SEVERE OBESITY DUE TO EXCESS CALORIES WITHOUT SERIOUS COMORBIDITY WITH BODY MASS INDEX (BMI) OF 40.0 TO 44.9 IN ADULT: ICD-10-CM

## 2024-07-24 DIAGNOSIS — L40.59 POLYARTICULAR PSORIATIC ARTHRITIS: ICD-10-CM

## 2024-07-24 DIAGNOSIS — Z51.81 ENCOUNTER FOR MONITORING IMMUNOSUPPRESSIVE MEDICATION THERAPY CAUSING IMMUNODEFICIENCY: ICD-10-CM

## 2024-07-24 DIAGNOSIS — R53.83 FATIGUE, UNSPECIFIED TYPE: Primary | ICD-10-CM

## 2024-07-24 DIAGNOSIS — R51.9 CHRONIC NONINTRACTABLE HEADACHE, UNSPECIFIED HEADACHE TYPE: ICD-10-CM

## 2024-07-24 DIAGNOSIS — D84.821 ENCOUNTER FOR MONITORING IMMUNOSUPPRESSIVE MEDICATION THERAPY CAUSING IMMUNODEFICIENCY: ICD-10-CM

## 2024-07-24 DIAGNOSIS — Z79.60 ENCOUNTER FOR MONITORING IMMUNOSUPPRESSIVE MEDICATION THERAPY CAUSING IMMUNODEFICIENCY: ICD-10-CM

## 2024-07-24 DIAGNOSIS — G89.29 CHRONIC NONINTRACTABLE HEADACHE, UNSPECIFIED HEADACHE TYPE: ICD-10-CM

## 2024-07-24 DIAGNOSIS — Z79.899 IMMUNODEFICIENCY DUE TO DRUG THERAPY: ICD-10-CM

## 2024-07-24 DIAGNOSIS — D84.821 IMMUNODEFICIENCY DUE TO DRUG THERAPY: ICD-10-CM

## 2024-07-24 DIAGNOSIS — F51.01 PRIMARY INSOMNIA: ICD-10-CM

## 2024-07-24 LAB
ALBUMIN SERPL BCP-MCNC: 3.5 G/DL (ref 3.5–5.2)
ALP SERPL-CCNC: 95 U/L (ref 55–135)
ALT SERPL W/O P-5'-P-CCNC: 21 U/L (ref 10–44)
ANION GAP SERPL CALC-SCNC: 8 MMOL/L (ref 8–16)
AST SERPL-CCNC: 19 U/L (ref 10–40)
BASOPHILS # BLD AUTO: 0.02 K/UL (ref 0–0.2)
BASOPHILS NFR BLD: 0.3 % (ref 0–1.9)
BILIRUB SERPL-MCNC: 0.3 MG/DL (ref 0.1–1)
BUN SERPL-MCNC: 10 MG/DL (ref 6–20)
CALCIUM SERPL-MCNC: 9 MG/DL (ref 8.7–10.5)
CHLORIDE SERPL-SCNC: 102 MMOL/L (ref 95–110)
CO2 SERPL-SCNC: 30 MMOL/L (ref 23–29)
CREAT SERPL-MCNC: 1.1 MG/DL (ref 0.5–1.4)
CRP SERPL-MCNC: 44.3 MG/L (ref 0–8.2)
DIFFERENTIAL METHOD BLD: ABNORMAL
EOSINOPHIL # BLD AUTO: 0.2 K/UL (ref 0–0.5)
EOSINOPHIL NFR BLD: 3.3 % (ref 0–8)
ERYTHROCYTE [DISTWIDTH] IN BLOOD BY AUTOMATED COUNT: 13.2 % (ref 11.5–14.5)
ERYTHROCYTE [SEDIMENTATION RATE] IN BLOOD BY PHOTOMETRIC METHOD: 28 MM/HR (ref 0–36)
EST. GFR  (NO RACE VARIABLE): >60 ML/MIN/1.73 M^2
GLUCOSE SERPL-MCNC: 82 MG/DL (ref 70–110)
HCT VFR BLD AUTO: 40.3 % (ref 37–48.5)
HGB BLD-MCNC: 12.8 G/DL (ref 12–16)
IMM GRANULOCYTES # BLD AUTO: 0.02 K/UL (ref 0–0.04)
IMM GRANULOCYTES NFR BLD AUTO: 0.3 % (ref 0–0.5)
LYMPHOCYTES # BLD AUTO: 2 K/UL (ref 1–4.8)
LYMPHOCYTES NFR BLD: 28.8 % (ref 18–48)
MCH RBC QN AUTO: 27.5 PG (ref 27–31)
MCHC RBC AUTO-ENTMCNC: 31.8 G/DL (ref 32–36)
MCV RBC AUTO: 87 FL (ref 82–98)
MONOCYTES # BLD AUTO: 0.5 K/UL (ref 0.3–1)
MONOCYTES NFR BLD: 7 % (ref 4–15)
NEUTROPHILS # BLD AUTO: 4.2 K/UL (ref 1.8–7.7)
NEUTROPHILS NFR BLD: 60.3 % (ref 38–73)
NRBC BLD-RTO: 0 /100 WBC
PLATELET # BLD AUTO: 378 K/UL (ref 150–450)
PMV BLD AUTO: 9.4 FL (ref 9.2–12.9)
POTASSIUM SERPL-SCNC: 4.1 MMOL/L (ref 3.5–5.1)
PROT SERPL-MCNC: 7.7 G/DL (ref 6–8.4)
RBC # BLD AUTO: 4.66 M/UL (ref 4–5.4)
SODIUM SERPL-SCNC: 140 MMOL/L (ref 136–145)
WBC # BLD AUTO: 6.99 K/UL (ref 3.9–12.7)

## 2024-07-24 PROCEDURE — 3008F BODY MASS INDEX DOCD: CPT | Mod: CPTII,S$GLB,, | Performed by: STUDENT IN AN ORGANIZED HEALTH CARE EDUCATION/TRAINING PROGRAM

## 2024-07-24 PROCEDURE — 99999 PR PBB SHADOW E&M-EST. PATIENT-LVL IV: CPT | Mod: PBBFAC,,, | Performed by: STUDENT IN AN ORGANIZED HEALTH CARE EDUCATION/TRAINING PROGRAM

## 2024-07-24 PROCEDURE — 80053 COMPREHEN METABOLIC PANEL: CPT | Performed by: STUDENT IN AN ORGANIZED HEALTH CARE EDUCATION/TRAINING PROGRAM

## 2024-07-24 PROCEDURE — 3074F SYST BP LT 130 MM HG: CPT | Mod: CPTII,S$GLB,, | Performed by: STUDENT IN AN ORGANIZED HEALTH CARE EDUCATION/TRAINING PROGRAM

## 2024-07-24 PROCEDURE — 1160F RVW MEDS BY RX/DR IN RCRD: CPT | Mod: CPTII,S$GLB,, | Performed by: STUDENT IN AN ORGANIZED HEALTH CARE EDUCATION/TRAINING PROGRAM

## 2024-07-24 PROCEDURE — 85652 RBC SED RATE AUTOMATED: CPT | Performed by: STUDENT IN AN ORGANIZED HEALTH CARE EDUCATION/TRAINING PROGRAM

## 2024-07-24 PROCEDURE — 36415 COLL VENOUS BLD VENIPUNCTURE: CPT | Performed by: STUDENT IN AN ORGANIZED HEALTH CARE EDUCATION/TRAINING PROGRAM

## 2024-07-24 PROCEDURE — 85025 COMPLETE CBC W/AUTO DIFF WBC: CPT | Performed by: STUDENT IN AN ORGANIZED HEALTH CARE EDUCATION/TRAINING PROGRAM

## 2024-07-24 PROCEDURE — 86140 C-REACTIVE PROTEIN: CPT | Performed by: STUDENT IN AN ORGANIZED HEALTH CARE EDUCATION/TRAINING PROGRAM

## 2024-07-24 PROCEDURE — 3044F HG A1C LEVEL LT 7.0%: CPT | Mod: CPTII,S$GLB,, | Performed by: STUDENT IN AN ORGANIZED HEALTH CARE EDUCATION/TRAINING PROGRAM

## 2024-07-24 PROCEDURE — 99215 OFFICE O/P EST HI 40 MIN: CPT | Mod: S$GLB,,, | Performed by: STUDENT IN AN ORGANIZED HEALTH CARE EDUCATION/TRAINING PROGRAM

## 2024-07-24 PROCEDURE — 3079F DIAST BP 80-89 MM HG: CPT | Mod: CPTII,S$GLB,, | Performed by: STUDENT IN AN ORGANIZED HEALTH CARE EDUCATION/TRAINING PROGRAM

## 2024-07-24 PROCEDURE — 1159F MED LIST DOCD IN RCRD: CPT | Mod: CPTII,S$GLB,, | Performed by: STUDENT IN AN ORGANIZED HEALTH CARE EDUCATION/TRAINING PROGRAM

## 2024-07-24 RX ORDER — METHENAMINE, SODIUM PHOSPHATE, MONOBASIC, MONOHYDRATE, PHENYL SALICYLATE, METHYLENE BLUE, AND HYOSCYAMINE SULFATE 118; 40.8; 36; 10; .12 MG/1; MG/1; MG/1; MG/1; MG/1
CAPSULE ORAL
COMMUNITY

## 2024-07-24 RX ORDER — VILAZODONE HYDROCHLORIDE 20 MG/1
1 TABLET ORAL
COMMUNITY
Start: 2024-07-10

## 2024-07-24 RX ORDER — LISDEXAMFETAMINE DIMESYLATE 20 MG/1
20 CAPSULE ORAL EVERY MORNING
COMMUNITY
Start: 2024-04-15 | End: 2024-07-24

## 2024-07-24 RX ORDER — PREGABALIN 50 MG/1
50 CAPSULE ORAL 2 TIMES DAILY
Qty: 60 CAPSULE | Refills: 5 | Status: SHIPPED | OUTPATIENT
Start: 2024-07-24

## 2024-07-24 NOTE — PATIENT INSTRUCTIONS
Labs today and in 3 months.  Start Lyrica 50 mg twice a day and inform your Psychiatrist of this.  Continue Tremfya.  Continue low dose naltrexone.  Follow up 3 months.

## 2024-07-24 NOTE — PROGRESS NOTES
"Subjective:      Patient ID: Bee Condon is a 38 y.o. female.    Chief Complaint: Psoriatic Arthritis    HPI:   Patient presents for Rheumatology follow up for psoriatic arthritis. Also has fibromyalgia. This is my first meeting with her. PsA diagnosed by Dr. BEST in 2022 due to elevated inflammatory markers, negative RF, history of rash (eczema or psoriasis), father with psoriasis, and migratory inflammatory arthritis. Also endorses episodes of dactylitis in the past. Currently on Tremfya since 03/2024 but treatment has been interrupted due to refill delay, flu, UTI. Next dose Tremfya due next week but she needs to order her refill. History of swelling of PIP joints, lor left hand, which seem to be resolved with Tremfya and the DMARDs she tried int he past. Dactylitis resolved, no recent episodes. Denies history of enthesitis, eye inflammation. Never had psoriasis. Had a lot of eczema  previously and well managed by avoiding triggers.    Fibromyalgia. On LDN which has helped. Was also on Lyrica but this she says was added by Psychiatry in the past to help with back pain which has since resolved so Lyrica was discontinued. Current symptoms of fatigue, brain fog, body soreness with minimal activity (washing dishes). Poor sleep - can't get comfortable. Headaches. Never had a sleep apnea test. Child has autism so she is busy at home.     Has had serotonin syndrome. Sees a Psychiatrist for major depression and anxiety. Has had suicidal thoughts in the past, none presently.     Denies joint swelling, significant stiffness, skin rashes, oral ulcers, patchy alopecia, sicca symptoms, cardiopulmonary symptoms, eye inflammation, IBD, fevers, weight loss, Raynaud's. Rheumatology review of systems is otherwise negative.        Objective:   /88   Pulse 93   Ht 5' 8" (1.727 m)   Wt 125.5 kg (276 lb 10.8 oz)   BMI 42.07 kg/m²   Physical Exam  Constitutional:       General: She is not in acute distress.     " Appearance: Normal appearance. She is obese.   HENT:      Head: Normocephalic and atraumatic.      Mouth/Throat:      Mouth: Mucous membranes are moist.      Pharynx: Oropharynx is clear.   Cardiovascular:      Rate and Rhythm: Normal rate and regular rhythm.   Pulmonary:      Effort: Pulmonary effort is normal.      Breath sounds: Normal breath sounds.   Abdominal:      Palpations: Abdomen is soft.      Tenderness: There is no abdominal tenderness.   Musculoskeletal:         General: Tenderness present. No swelling.      Cervical back: Normal range of motion. No tenderness.   Skin:     General: Skin is warm and dry.   Neurological:      Mental Status: She is alert and oriented to person, place, and time. Mental status is at baseline.               Assessment and Plan:     Problem List Items Addressed This Visit          Unprioritized    Polyarticular psoriatic arthritis    Relevant Orders    CBC Auto Differential    Comprehensive Metabolic Panel    C-Reactive Protein    Sedimentation rate     Other Visit Diagnoses       Fatigue, unspecified type    -  Primary    Relevant Orders    Ambulatory referral/consult to Sleep Disorders    Chronic nonintractable headache, unspecified headache type        Relevant Orders    Ambulatory referral/consult to Sleep Disorders    Class 3 severe obesity due to excess calories without serious comorbidity with body mass index (BMI) of 40.0 to 44.9 in adult        Relevant Orders    Ambulatory referral/consult to Sleep Disorders    Primary insomnia        Relevant Orders    Ambulatory referral/consult to Sleep Disorders    Immunodeficiency due to drug therapy        Encounter for monitoring immunosuppressive medication therapy causing immunodeficiency                Patient presents for Rheumatology follow up for psoriatic arthritis. Also has fibromyalgia.      Psoriatic arthritis  With inflammatory arthritis, dactylitis, negative RF, elevated inflammatory markers. Rash might be more  eczema. Family history of psoriasis.    Previous Treatment:   - MTX - intolerance - severe fatigue  - Humira - s/e - rash and feeling sick for several days  - Rinvoq - effective but GI upset  - Cosentyx - ineffective  - Stelara - ineffective  - Cymbalta - serotonin syndrome  - Steroids - side effects    Currently on Tremfya and completed loading dose and should be starting maintenance dose. Previous doses were interrupted so this might explain the tender joints today. Will give the medication more time to take effect. She declines steroids due to side effects.    Plan:  Safety labs today and in 3 months  Continue Tremfya every 8 weeks          Fibromyalgia   Anxiety, depression, history serotonin syndrome.  Insomnia  Uncontrolled with increased fatigue, widespread pain, central sensitivity symptoms, symptom severity score.    Continue LDN  Resume Lyrica 50 mg twice daily. She will inform her Psychiatrist who previously prescribed Lyrica to make sure no interference with her therapy.  Sleep Clinic referral for sleep apnea evaluation and insomnia.          High Risk Medication Monitoring encounter  Drug therapy requiring intensive monitoring for toxicity  No current medication related issues, no evidence of toxicity  Appropriate labs ordered for toxicity monitoring    Compromised immune system secondary to autoimmune disease and/or use of immunosuppressive drugs.  Monitor carefully for infections.  Advised patient to get immediate medical care if any infection arises.  Also advised strict adherence age-appropriate vaccinations and cancer screenings with PCP.    Patient advised to hold DMARD and/or biologic therapy for signs of infection or for surgery. If you are unsure what to do please call our office for instruction.[facility] [phone]          Follow up in about 3 months (around 10/24/2024).         I spent a total of 64 minutes on the day of the visit.  This includes face to face time and  non-face to face time preparing to see the patient (eg, review of tests), obtaining and/or reviewing separately obtained history, documenting clinical information in the electronic or other health record, independently interpreting results and communicating results to the patient/family/caregiver, or care coordinator.

## 2024-07-30 ENCOUNTER — TELEPHONE (OUTPATIENT)
Dept: SLEEP MEDICINE | Facility: CLINIC | Age: 39
End: 2024-07-30
Payer: COMMERCIAL

## 2024-07-30 ENCOUNTER — OFFICE VISIT (OUTPATIENT)
Dept: PULMONOLOGY | Facility: CLINIC | Age: 39
End: 2024-07-30
Payer: COMMERCIAL

## 2024-07-30 VITALS
OXYGEN SATURATION: 96 % | BODY MASS INDEX: 42.77 KG/M2 | HEIGHT: 68 IN | WEIGHT: 282.19 LBS | RESPIRATION RATE: 16 BRPM | SYSTOLIC BLOOD PRESSURE: 123 MMHG | HEART RATE: 75 BPM | DIASTOLIC BLOOD PRESSURE: 70 MMHG

## 2024-07-30 DIAGNOSIS — G89.29 CHRONIC NONINTRACTABLE HEADACHE, UNSPECIFIED HEADACHE TYPE: ICD-10-CM

## 2024-07-30 DIAGNOSIS — R53.83 FATIGUE, UNSPECIFIED TYPE: ICD-10-CM

## 2024-07-30 DIAGNOSIS — R51.9 CHRONIC NONINTRACTABLE HEADACHE, UNSPECIFIED HEADACHE TYPE: ICD-10-CM

## 2024-07-30 DIAGNOSIS — R06.83 SNORING: Primary | ICD-10-CM

## 2024-07-30 DIAGNOSIS — F51.01 PRIMARY INSOMNIA: ICD-10-CM

## 2024-07-30 DIAGNOSIS — E66.01 CLASS 3 SEVERE OBESITY DUE TO EXCESS CALORIES WITHOUT SERIOUS COMORBIDITY WITH BODY MASS INDEX (BMI) OF 40.0 TO 44.9 IN ADULT: ICD-10-CM

## 2024-07-30 PROCEDURE — 1160F RVW MEDS BY RX/DR IN RCRD: CPT | Mod: CPTII,S$GLB,, | Performed by: INTERNAL MEDICINE

## 2024-07-30 PROCEDURE — 1159F MED LIST DOCD IN RCRD: CPT | Mod: CPTII,S$GLB,, | Performed by: INTERNAL MEDICINE

## 2024-07-30 PROCEDURE — 3078F DIAST BP <80 MM HG: CPT | Mod: CPTII,S$GLB,, | Performed by: INTERNAL MEDICINE

## 2024-07-30 PROCEDURE — 99999 PR PBB SHADOW E&M-EST. PATIENT-LVL V: CPT | Mod: PBBFAC,,, | Performed by: INTERNAL MEDICINE

## 2024-07-30 PROCEDURE — 3044F HG A1C LEVEL LT 7.0%: CPT | Mod: CPTII,S$GLB,, | Performed by: INTERNAL MEDICINE

## 2024-07-30 PROCEDURE — 3074F SYST BP LT 130 MM HG: CPT | Mod: CPTII,S$GLB,, | Performed by: INTERNAL MEDICINE

## 2024-07-30 PROCEDURE — 3008F BODY MASS INDEX DOCD: CPT | Mod: CPTII,S$GLB,, | Performed by: INTERNAL MEDICINE

## 2024-07-30 PROCEDURE — 99204 OFFICE O/P NEW MOD 45 MIN: CPT | Mod: S$GLB,,, | Performed by: INTERNAL MEDICINE

## 2024-07-30 NOTE — PATIENT INSTRUCTIONS
No eating / drinking for 3 hours before going to bed.  Elevate head of bed 30 - 45 %     CPAP HABITUATION PROCEDURE     Bandar Hutton, Ph.D., [facility] and Yann Campuzano M.D.  [facility], [facility]     Some people have difficulty adjusting to CPAP/BiPAP/AutoCPAP.  This is not unusual or hard to understand: Breathing with CPAP is different from ordinary breathing, and this difference is aversive to some. The problem can be overcome, however, and the benefits CPAP confers are certainly worth the effort.  Below, you will find a simple and gradual way to get used to CPAP before you try to use it all night, every night.  The essence of this procedure is to relax and let breathing with CPAP become a habit.  It may take about 2 weeks, and involves the following:      CPAP while awake and comfortably seated, during the late evening.     CPAP in bed while attempting sleep at night.     If your discomfort is too great at any time, discontinue and attempt again later the same night, for the same amount of time.   You and your physician may alter the times and pressures if necessary.     If you find that it is very easy to get used to CPAP, you may start using it every night when you are comfortable enough to do so.  IMPORTANT REMINDER: If you have a cold or sinus congestion it is okay to miss a night or two of CPAP. Consider using antihistamines or decongestants to clear up your sinus congestion prior to sleeping.     DAYS  1-3   Start CPAP while awake and comfortably seated during the late evening, after having prepared for bed.  You may do this while watching television, listening to music or reading. Use for 1 hour, then take off CPAP and go directly to bed to sleep     DAYS  4-6     Start CPAP when you go to bed and use for 1 hour, or until you fall asleep.  If your discomfort is too great at any time, discontinue and attempt again later the same night, for the same designated  amount of time (1 hour).      DAYS  7-9     Increase time with CPAP to 2 hours a night.  If your discomfort is too great at any time, discontinue and attempt again later the same night, for the same designated amount of time (2 hours).      DAYS 10-12    Increase time with CPAP to 3 hours a night. If your discomfort is too great at any time, discontinue and attempt again later the same night, for the same designated amount of time (3 hours).      DAYS 13-15     Sleep the entire night with CPAP.      OPTIONAL: You may use Progressive Muscle Relaxation (PMR) to help put you at ease when using CPAP; do PMR twice each day, once in the morning or afternoon, and once in the evening just before using CPAP. You may do PMR prior to any attempt until you are comfortable with CPAP.        Continuous Positive Air Pressure (CPAP)  Continuous positive air pressure (CPAP) uses gentle air pressure to hold the airway open. CPAP is often the most effective treatment for sleep apnea and severe snoring. It works very well for many people. But keep in mind that it can take several adjustments before the setup is right for you.       How CPAP Works  CPAP is a small portable pump beside the bed. The pump sends air through a hose, which is held over your nose and/or mouth by a mask. Mild air pressure is gently pushed through your airway. The air pressure nudges sagging tissues aside. This widens the airway so you can breathe better. CPAP may be combined with other kinds of therapy for sleep apnea.       Types of Air Pressure Treatments  There are different types of CPAP. Your doctor or CPAP technician will help you decide which type is best for you:  Basic CPAP keeps the pressure constant all night long.  A bilevel device (BiPAP) provides more pressure when you breathe in and less when you breathe out. A BiPAP machine also may be set to provide automatic breaths to maintain breathing if you stop breathing while sleeping.  An autoCPAP  device automatically adjusts pressure throughout the night and in response to changes such as body position, sleep stage, and snoring.  © 7735-3290 [employer]. [address] [place] [address]. All rights reserved. This information is not intended as a substitute for professional medical care. Always follow your healthcare professional's instructions.        Snoring and Sleep Apnea: Notes for a Partner  Snoring and sleep apnea affect your life, as well as your partners. You can help in the treatment of the problem. Be supportive. Encourage your partner both to get treatment and to make the adjustments needed to follow through.       Adjusting to Changes  Your partners treatment may involve making changes to certain life habits. You can help your partner make and stick with these changes. For example:  Support and even join your partners exercise program.  Be supportive if your partner gets CPAP (continuous positive airway pressure). He or she may feel self-conscious at first. Remind your partner to expect adjustments to CPAP before it feels just right.  Consider joining a snoring and sleep apnea support group.  Go Along to See the Health Care Provider  You can give the health care provider the best account of your partners nighttime breathing and snoring patterns. Try to go along to health care providers appointments. If you cant go, write notes for your partner to give to the health care provider. Describe your partners snoring and sleep breathing patterns in detail.  Tips for Sleeping with a Snorer  Until treatment takes care of your partners snoring:  Try to go to bed first. It may help if youre already asleep when your partner starts to snore.  Wear earplugs to bed. A fan or other source of background noise may also help drown out snoring.   © 0668-9410 [employer]. [address] [place] [address]. All rights reserved. This information is not intended as a  substitute for professional medical care. Always follow your healthcare professional's instructions.        Continuous Positive Airway Pressure (CPAP)  Your health care provider has prescribed continuous positive airway pressure (CPAP) therapy for you. A CPAP device helps you breathe better at night. The device delivers air through your nose or mouth when you breathe in to keep your air passages open. CPAP is:  Used most often to treat sleep apnea and some other problems (Sleep apnea is a chronic condition with periods of sleep in which you briefly stop breathing.)  Safe and very effective, but it takes time to get used to the mask.   Your health care provider, nurse, or medical supplier will give you tips for wearing and caring for your CPAP device.  General guidelines  It's very important not to give up! It takes time to get used to wearing the mask at night.  Practice using your CPAP device during the day, especially whenever you take a nap.  Remember, there are several different types of masks. If you cant get used to your mask, ask your provider or medical supply company about trying another style.  If you have nasal stuffiness or dryness when using your CPAP device, talk with your provider or medical supply company. There are ways to lessen these problems. For example, your provider may recommend moistening nasal spray or the medical supply company may recommend a device with a humidifier.  The goal is to use your CPAP all night, every night, during all naps, and even when you travel.  Keep your mask clean. Wash it with soap and water. Be sure to rinse the mask and tubing well with water to remove any soap. Let them air-dry thoroughly before using.  Make yourself comfortable when sleeping with CPAP. Try using extra pillows.  Work with your medical supply company so that you know how to correctly use your CPAP. Their representative will be able to help you:  Use the CPAP correctly  Troubleshoot any problems  that come up  Learn to clean and maintain the device  Adjust to regular use of the CPAP  © 0730-0083 [employer]. [address] [place] [address], [place] [address]. All rights reserved. This information is not intended as a substitute for professional medical care. Always follow your healthcare professional's instructions.

## 2024-07-30 NOTE — TELEPHONE ENCOUNTER
----- Message from Jonathon Moreira sent at 7/30/2024  1:06 PM CDT -----  [facility]

## 2024-07-30 NOTE — PROGRESS NOTES
Initial Outpatient Pulmonary Evaluation       SUBJECTIVE:     Chief Complaint   Patient presents with    Fatigue       History of Present Illness:    Patient is a 38 y.o. female referred by Rheumatology for evaluation of fatigue and insomnia.        Chronic insomnia, job , daughter autistic could have been co-stressors    TIKA 16    Bedtime 10:30-11 pm sleep onset 12-1: 30 am sleep offset 9 am on non school days school day 7 - 8 am     Frequent awakening by her child, and 1 x nocturia     Melatonin 7.5 mg 10-30 -11 am       ESS 14     STOP - BANG Questionnaire:     1. Snoring : Do you snore loudly ?    Yes    2. Tired : Do you often feel tired, fatigued, or sleepy during daytime? Yes    3. Observed: Has anyone observed you stop breathing during your sleep?   No     4. Blood pressure : Do you have or are you being treated for high blood pressure?   Yes    5. BMI :BMI more than 35 kg/m2?   Yes    6. Age : Age over 50 yr old?   No    7. Neck circumference:   For male, is your shirt collar 17 inches / 43cm or larger?  For female, is your shirt collar 16 inches / 41cm or larger?    yes    8. Gender: Gender male?   No    STOP BANG SCORE 5    High risk of JASEN: Yes 5 - 8  Intermediate risk of JASEN: Yes 3 - 4  Low risk of JASEN: Yes 0 - 2      References:   STOP Questionnaire   A Tool to Screen Patients for Obstructive Sleep Apnea: MARCELLE Bauer.C.P.C., BRIANNA Irene.B.B.S., Genesis Suggs M.D.,Lili Garcia, Ph.D., Allen Beauchamp M.B.B.S.,_ Sumit Guerrero M.Sc.,_ Stephon Aleman M.D., Jason Nagy F.R.C.P.C.; Anesthesiology 2008; 108:812-21 Copyright © 2008, the American Society of Anesthesiologists, Inc. [employer]      Review of Systems   Constitutional:  Positive for fatigue.   Respiratory:  Positive for apnea, snoring and somnolence.    Psychiatric/Behavioral:  Positive for sleep disturbance.        Review of patient's allergies  indicates:   Allergen Reactions    Iodine     Oxycodone-acetaminophen Hives and Itching    Shellfish containing products        Current Outpatient Medications   Medication Sig Dispense Refill    azelastine (ASTELIN) 137 mcg (0.1 %) nasal spray 1-2 SEN BID PRN      cetirizine (ZYRTEC) 10 MG tablet Take 10 mg by mouth.      d-mannose 500 mg Cap Take by mouth.      estrogens,esterified,-methyltestosterone 1.25-2.5mg (ESTRATEST) per tablet Take by mouth.      guselkumab (TREMFYA) 100 mg/mL AtIn Inject 100 mg into the skin every 8 weeks. 1 each 3    hydroCHLOROthiazide (HYDRODIURIL) 25 MG tablet Take 25 mg by mouth.      levocetirizine (XYZAL) 5 MG tablet Take 5 mg by mouth every evening.      MAGNESIUM GLYCINATE ORAL Take by mouth.      naltrexone capsule 7 mg qhs 90 capsule 1    pregabalin (LYRICA) 50 MG capsule Take 1 capsule (50 mg total) by mouth 2 (two) times daily. 60 capsule 5    rizatriptan (MAXALT) 10 MG tablet Take by mouth.      sucralfate (CARAFATE) 1 gram tablet TAKE 1 TABLET(1 GRAM) BY MOUTH FOUR TIMES DAILY BEFORE MEALS AND AT NIGHT 120 tablet 0    URIBEL 118-10-40.8-36 mg Cap Take by mouth.      vilazodone (VIIBRYD) 20 mg Tab Take 1 tablet by mouth.       No current facility-administered medications for this visit.       Past Medical History:   Diagnosis Date    Anxiety     Depression     Interstitial cystitis      Past Surgical History:   Procedure Laterality Date     SECTION      ESOPHAGOGASTRODUODENOSCOPY N/A 5/10/2024    Procedure: EGD (ESOPHAGOGASTRODUODENOSCOPY) with biopsies;  Surgeon: Jamilah Horta MD;  Location: [facility];  Service: Endoscopy;  Laterality: N/A;    TUBAL LIGATION       Family History   Problem Relation Name Age of Onset    Cancer Mother      Heart disease Father      Diabetes Mellitus Father      Cancer Paternal Aunt      Hyperlipidemia Paternal Aunt       Social History     Tobacco Use    Smoking status: Never    Smokeless tobacco: Never   Substance Use Topics  "   Alcohol use: Not Currently    Drug use: Never          OBJECTIVE:     Vital Signs (Most Recent)  Vital Signs  Pulse: 75  Resp: 16  SpO2: 96 %  BP: 123/70  Height and Weight  Height: 5' 8" (172.7 cm)  Weight: 128 kg (282 lb 3 oz)  BSA (Calculated - sq m): 2.48 sq meters  BMI (Calculated): 42.9  Weight in (lb) to have BMI = 25: 164.1]  Wt Readings from Last 2 Encounters:   07/30/24 128 kg (282 lb 3 oz)   07/24/24 125.5 kg (276 lb 10.8 oz)         Physical Exam:  Physical Exam   Constitutional: She is oriented to person, place, and time. She appears well-developed and well-nourished. No distress.   HENT:   Mouth/Throat: Mallampati Score: III.   Pulmonary/Chest: No respiratory distress.   Neurological: She is alert and oriented to person, place, and time.   Psychiatric: Her behavior is normal.       Laboratory  Lab Results   Component Value Date    WBC 6.99 07/24/2024    RBC 4.66 07/24/2024    HGB 12.8 07/24/2024    HCT 40.3 07/24/2024    MCV 87 07/24/2024    MCH 27.5 07/24/2024    MCHC 31.8 (L) 07/24/2024    RDW 13.2 07/24/2024     07/24/2024    MPV 9.4 07/24/2024    GRAN 4.2 07/24/2024    GRAN 60.3 07/24/2024    LYMPH 2.0 07/24/2024    LYMPH 28.8 07/24/2024    MONO 0.5 07/24/2024    MONO 7.0 07/24/2024    EOS 0.2 07/24/2024    BASO 0.02 07/24/2024    EOSINOPHIL 3.3 07/24/2024    BASOPHIL 0.3 07/24/2024       BMP  Lab Results   Component Value Date     07/24/2024    K 4.1 07/24/2024     07/24/2024    CO2 30 (H) 07/24/2024    BUN 10 07/24/2024    CREATININE 1.1 07/24/2024    CALCIUM 9.0 07/24/2024    ANIONGAP 8 07/24/2024    ESTGFRAFRICA >60 04/13/2022    EGFRNONAA >60 04/13/2022    AST 19 07/24/2024    ALT 21 07/24/2024    PROT 7.7 07/24/2024       No results found for: "BNP"    Lab Results   Component Value Date    TSH 2.45 09/28/2023       Lab Results   Component Value Date    SEDRATE 28 07/24/2024       Lab Results   Component Value Date    CRP 44.3 (H) 07/24/2024       No results found for: " ""IGE"    No results found for: "ASPERGILLUS"  No results found for: "AFUMIGATUSCL"     No results found for: "ACE"    Diagnostic Results:  I have personally reviewed today the following studies :        ASSESSMENT/PLAN:     Snoring  -     Home Sleep Study; Future    Fatigue, unspecified type  -     Ambulatory referral/consult to Sleep Disorders    Chronic nonintractable headache, unspecified headache type  -     Ambulatory referral/consult to Sleep Disorders    Class 3 severe obesity due to excess calories without serious comorbidity with body mass index (BMI) of 40.0 to 44.9 in adult  -     Ambulatory referral/consult to Sleep Disorders    Primary insomnia  -     Ambulatory referral/consult to Sleep Disorders        Home sleep study.  CPAP recommendation to follow    Discussed with patient the 2 model sleep processes C (circadian rhythm) and process S (homeostatic sleep pressure) and the importance of aligning the 2 processes.  Discussed with patient as well the Lori 3 P's insomnia model (predisposing, precipitating, perpetuating factors) and tools to combat these factors.     Discussed and advised patient to apply  sleep restriction, stimulus control, relaxation technique, imagery, and paradoxical intention.      Follow up in about 3 months (around 10/30/2024).    This note was prepared using voice recognition system and is likely to have sound alike errors that may have been overlooked even after proof reading.  Please call me with any questions    Discussed diagnosis, its evaluation, treatment and usual course. All questions answered.    Thank you for the courtesy of participating in the care of this patient    Brooke Mazariegos MD          "

## 2024-09-19 ENCOUNTER — HOSPITAL ENCOUNTER (OUTPATIENT)
Dept: SLEEP MEDICINE | Facility: HOSPITAL | Age: 39
Discharge: HOME | End: 2024-09-19
Attending: INTERNAL MEDICINE
Payer: COMMERCIAL

## 2024-09-19 DIAGNOSIS — R06.83 SNORING: ICD-10-CM

## 2024-09-19 PROCEDURE — 95806 SLEEP STUDY UNATT&RESP EFFT: CPT | Performed by: INTERNAL MEDICINE

## 2024-09-20 PROBLEM — R06.83 SNORING: Status: ACTIVE | Noted: 2024-09-20

## 2024-09-20 NOTE — PROCEDURES
PHYSICIAN INTERPRETATION AND COMMENTS: Findings are consistent with mild, positional obstructive sleep apnea(JASEN).  CLINICAL HISTORY: 39 year old female presented with: 16 inch neck, BMI of 43.5, an [place] sleepiness score of 10, history  of hypertension, depression and symptoms of nocturnal snoring. Based on the clinical history, the patient has a high pretest  probability of having Moderate JASEN.  SLEEP STUDY FINDINGS: Patient underwent a 1 night Home Sleep Test and by behavioral criteria, slept for approximately  9.49 hours, with a sleep latency of 6 minutes and a sleep efficiency of 95%. The patient slept supine 37.1% of the night  based on valid recording time of 5.91 hours. When considering more subtle measures of sleep disordered breathing, the  overall respiratory disturbance index is mild(RDI=16) based on a 1% hypopnea desaturation criteria with confirmation by  surrogate arousal indicators. The apneas/hypopneas are accompanied by minimal oxygen desaturation (percent time  below 90% SpO2: 0%, Min SpO2: 92.3%). The average desaturation across all sleep disordered breathing events is 1.4%.  Snoring occurs for 26% (30 dB) of the study, 5% is very loud. The mean pulse rate is 70.3 BPM, with infrequent pulse rate  variability (38 events with >= 6 BPM increase/decrease per hour).  TREATMENT CONSIDERATIONS: For a patient with mild asymptomatic JASEN, nasal continuous positive airway pressure  (CPAP/AutoPAP) therapy or alternative therapies for treatment should be considered, i.e., Mandibular advancement splint  (MAS), referral to an ENT surgeon for modification to the airway, and/or weight loss or behavioral therapy to reduce the  potential risk of daytime somnolence, hypertension, cardiovascular disease, stroke and diabetes. Based on the JASEN  Supine data in the study, a Mandibular Advancement Splint (MAS) will likely provide treatment benefit independent of JASEN  severity. The patient should avoid sleeping supine;  "the non-supine RDI is 2.4 times less severe than the supine RDI.  DISEASE MANAGEMENT CONSIDERATIONS: Insomnia and morning headaches are symptoms that can be associated with  untreated JASEN.      Dear Brooke Mazariegos MD  [address] [place] [address] [place]IN PIERRE [id number]/Germania Hernandez MD         The sleep study that you ordered is complete.  You have ordered sleep LAB services to perform the sleep study for Bee Condon .      Please find Sleep Study result in  the "Media tab" of Chart Review menu.        You can look  for the report in the  Media by the document type "Sleep Study Documents". Alphabetizing  "Document type" column helps to find the SLEEP STUDY report  Faster.       As the ordering provider, you are responsible for reviewing the results and implementing a treatment plan with your patient.    If you need a Sleep Medicine provider to explain the sleep study findings and arrange treatment for the patient, please refer patient for consultation to our Sleep Clinic via [facility] with Ambulatory Consult Sleep.     To do that please place an order for an  "Ambulatory Consult Sleep" -  order , it will go to our clinic work queue for our staff  to contact the patient for an appointment.      For any questions, please contact our sleep lab  staff at [phone] to talk to clinical staff          Yayo Munoz MD   "

## 2024-09-25 ENCOUNTER — OFFICE VISIT (OUTPATIENT)
Dept: PULMONOLOGY | Facility: CLINIC | Age: 39
End: 2024-09-25
Payer: COMMERCIAL

## 2024-09-25 VITALS
WEIGHT: 291.88 LBS | OXYGEN SATURATION: 99 % | HEART RATE: 89 BPM | DIASTOLIC BLOOD PRESSURE: 72 MMHG | SYSTOLIC BLOOD PRESSURE: 110 MMHG | RESPIRATION RATE: 16 BRPM | BODY MASS INDEX: 44.23 KG/M2 | HEIGHT: 68 IN

## 2024-09-25 DIAGNOSIS — Z71.89 CPAP USE COUNSELING: ICD-10-CM

## 2024-09-25 DIAGNOSIS — G47.33 MODERATE OBSTRUCTIVE SLEEP APNEA: Primary | ICD-10-CM

## 2024-09-25 DIAGNOSIS — R06.83 SNORING: ICD-10-CM

## 2024-09-25 DIAGNOSIS — G47.19 EXCESSIVE DAYTIME SLEEPINESS: ICD-10-CM

## 2024-09-25 PROCEDURE — 1159F MED LIST DOCD IN RCRD: CPT | Mod: CPTII,S$GLB,, | Performed by: INTERNAL MEDICINE

## 2024-09-25 PROCEDURE — 3008F BODY MASS INDEX DOCD: CPT | Mod: CPTII,S$GLB,, | Performed by: INTERNAL MEDICINE

## 2024-09-25 PROCEDURE — 3078F DIAST BP <80 MM HG: CPT | Mod: CPTII,S$GLB,, | Performed by: INTERNAL MEDICINE

## 2024-09-25 PROCEDURE — 99999 PR PBB SHADOW E&M-EST. PATIENT-LVL IV: CPT | Mod: PBBFAC,,, | Performed by: INTERNAL MEDICINE

## 2024-09-25 PROCEDURE — 3074F SYST BP LT 130 MM HG: CPT | Mod: CPTII,S$GLB,, | Performed by: INTERNAL MEDICINE

## 2024-09-25 PROCEDURE — 99214 OFFICE O/P EST MOD 30 MIN: CPT | Mod: S$GLB,,, | Performed by: INTERNAL MEDICINE

## 2024-09-25 PROCEDURE — 3044F HG A1C LEVEL LT 7.0%: CPT | Mod: CPTII,S$GLB,, | Performed by: INTERNAL MEDICINE

## 2024-09-25 PROCEDURE — 1160F RVW MEDS BY RX/DR IN RCRD: CPT | Mod: CPTII,S$GLB,, | Performed by: INTERNAL MEDICINE

## 2024-09-25 NOTE — PROGRESS NOTES
"                                         Pulmonary Outpatient Follow Up Visit     Subjective:       Patient ID: Bee Condon is a 39 y.o. female.    Chief Complaint: Sleep Apnea      HPI        39-year-old female patient presenting to discuss home sleep study results.      Positional mild JASEN with RDI of 16 nighttime O2 saturation of 88%.      [place] Sleepiness scale score 14.    Review of Systems   Respiratory:  Positive for apnea, snoring and somnolence.    Psychiatric/Behavioral:  Positive for sleep disturbance.        Outpatient Encounter Medications as of 9/25/2024   Medication Sig Dispense Refill    azelastine (ASTELIN) 137 mcg (0.1 %) nasal spray 1-2 SEN BID PRN      cetirizine (ZYRTEC) 10 MG tablet Take 10 mg by mouth.      d-mannose 500 mg Cap Take by mouth.      estrogens,esterified,-methyltestosterone 1.25-2.5mg (ESTRATEST) per tablet Take by mouth.      guselkumab (TREMFYA) 100 mg/mL AtIn Inject 100 mg into the skin every 8 weeks. 1 each 3    hydroCHLOROthiazide (HYDRODIURIL) 25 MG tablet Take 25 mg by mouth.      levocetirizine (XYZAL) 5 MG tablet Take 5 mg by mouth every evening.      naltrexone capsule 7 mg qhs 90 capsule 1    pregabalin (LYRICA) 50 MG capsule Take 1 capsule (50 mg total) by mouth 2 (two) times daily. 60 capsule 5    rizatriptan (MAXALT) 10 MG tablet Take by mouth.      vilazodone (VIIBRYD) 20 mg Tab Take 1 tablet by mouth.      sucralfate (CARAFATE) 1 gram tablet TAKE 1 TABLET(1 GRAM) BY MOUTH FOUR TIMES DAILY BEFORE MEALS AND AT NIGHT 120 tablet 0    [DISCONTINUED] MAGNESIUM GLYCINATE ORAL Take by mouth.      [DISCONTINUED] URIBEL 118-10-40.8-36 mg Cap Take by mouth.       No facility-administered encounter medications on file as of 9/25/2024.       Objective:     Vital Signs (Most Recent)  Vital Signs  Pulse: 89  Resp: 16  SpO2: 99 %  BP: 110/72  Height and Weight  Height: 5' 8" (172.7 cm)  Weight: 132.4 kg (291 lb 14.2 oz)  BSA (Calculated - sq m): 2.52 sq meters  BMI " "(Calculated): 44.4  Weight in (lb) to have BMI = 25: 164.1]  Wt Readings from Last 2 Encounters:   09/25/24 132.4 kg (291 lb 14.2 oz)   07/30/24 128 kg (282 lb 3 oz)       Physical Exam   Constitutional: She is oriented to person, place, and time. She appears well-developed.   Pulmonary/Chest: No respiratory distress.   Neurological: She is alert and oriented to person, place, and time.   Psychiatric: Her behavior is normal.       Laboratory  Lab Results   Component Value Date    WBC 6.99 07/24/2024    RBC 4.66 07/24/2024    HGB 12.8 07/24/2024    HCT 40.3 07/24/2024    MCV 87 07/24/2024    MCH 27.5 07/24/2024    MCHC 31.8 (L) 07/24/2024    RDW 13.2 07/24/2024     07/24/2024    MPV 9.4 07/24/2024    GRAN 4.2 07/24/2024    GRAN 60.3 07/24/2024    LYMPH 2.0 07/24/2024    LYMPH 28.8 07/24/2024    MONO 0.5 07/24/2024    MONO 7.0 07/24/2024    EOS 0.2 07/24/2024    BASO 0.02 07/24/2024    EOSINOPHIL 3.3 07/24/2024    BASOPHIL 0.3 07/24/2024       BMP  Lab Results   Component Value Date     07/24/2024    K 4.1 07/24/2024     07/24/2024    CO2 30 (H) 07/24/2024    BUN 10 07/24/2024    CREATININE 1.1 07/24/2024    CALCIUM 9.0 07/24/2024    ANIONGAP 8 07/24/2024    ESTGFRAFRICA >60 04/13/2022    EGFRNONAA >60 04/13/2022    AST 19 07/24/2024    ALT 21 07/24/2024    PROT 7.7 07/24/2024       No results found for: "BNP"    Lab Results   Component Value Date    TSH 2.45 09/28/2023       Lab Results   Component Value Date    SEDRATE 28 07/24/2024       Lab Results   Component Value Date    CRP 44.3 (H) 07/24/2024     No results found for: "IGE"     No results found for: "ASPERGILLUS"  No results found for: "AFUMIGATUSCL"     No results found for: "ACE"     Diagnostic Results:  I have personally reviewed today the following studies:      Assessment/Plan:   Moderate obstructive sleep apnea  -     CPAP FOR HOME USE    Snoring  -     CPAP FOR HOME USE    Excessive daytime sleepiness  -     CPAP FOR HOME USE    CPAP " use counseling  -     CPAP FOR HOME USE      Continue sleep restriction.  CBT-I juan sleep schedule review shows increased total time in bed.  Sleep onset within 20 minutes.  Number of awakenings 1-2.  5 minutes of wake after sleep onset.    Start CPAP 5-20 cm of water.    Follow up in about 3 months (around 12/25/2024).    This note was prepared using voice recognition system and is likely to have sound alike errors that may have been overlooked even after proof reading.  Please call me with any questions    Discussed diagnosis, its evaluation, treatment and usual course. All questions answered.      Brooke Mazariegos MD

## 2024-10-03 NOTE — PROGRESS NOTES
Fluad administered per protocol. See immunization record for details. Pt tolerated well without AEs. Discharged ambulatory by self to waiting room.  Lot # [id number]  Exp 5/10/24     
No

## 2024-10-11 ENCOUNTER — PATIENT MESSAGE (OUTPATIENT)
Dept: RHEUMATOLOGY | Facility: CLINIC | Age: 39
End: 2024-10-11
Payer: COMMERCIAL

## 2024-10-11 RX ORDER — METHYLPREDNISOLONE 4 MG/1
TABLET ORAL
Qty: 21 EACH | Refills: 0 | Status: SHIPPED | OUTPATIENT
Start: 2024-10-11 | End: 2024-11-01

## 2024-10-25 ENCOUNTER — PATIENT MESSAGE (OUTPATIENT)
Dept: PULMONOLOGY | Facility: CLINIC | Age: 39
End: 2024-10-25
Payer: COMMERCIAL

## 2024-10-25 DIAGNOSIS — F51.01 PRIMARY INSOMNIA: Primary | ICD-10-CM

## 2024-10-25 RX ORDER — ESZOPICLONE 1 MG/1
1 TABLET, FILM COATED ORAL NIGHTLY
Qty: 30 TABLET | Refills: 0 | Status: SHIPPED | OUTPATIENT
Start: 2024-10-25 | End: 2024-11-24

## 2024-11-04 DIAGNOSIS — F51.01 PRIMARY INSOMNIA: Primary | ICD-10-CM

## 2024-11-11 ENCOUNTER — PATIENT MESSAGE (OUTPATIENT)
Dept: PULMONOLOGY | Facility: CLINIC | Age: 39
End: 2024-11-11
Payer: COMMERCIAL

## 2024-11-22 ENCOUNTER — LAB VISIT (OUTPATIENT)
Dept: LAB | Facility: HOSPITAL | Age: 39
End: 2024-11-22
Attending: STUDENT IN AN ORGANIZED HEALTH CARE EDUCATION/TRAINING PROGRAM
Payer: COMMERCIAL

## 2024-11-22 DIAGNOSIS — L40.59 POLYARTICULAR PSORIATIC ARTHRITIS: ICD-10-CM

## 2024-11-22 LAB
ALBUMIN SERPL BCP-MCNC: 3.6 G/DL (ref 3.5–5.2)
ALP SERPL-CCNC: 84 U/L (ref 40–150)
ALT SERPL W/O P-5'-P-CCNC: 18 U/L (ref 10–44)
ANION GAP SERPL CALC-SCNC: 8 MMOL/L (ref 8–16)
AST SERPL-CCNC: 21 U/L (ref 10–40)
BASOPHILS # BLD AUTO: 0.02 K/UL (ref 0–0.2)
BASOPHILS NFR BLD: 0.2 % (ref 0–1.9)
BILIRUB SERPL-MCNC: 0.2 MG/DL (ref 0.1–1)
BUN SERPL-MCNC: 14 MG/DL (ref 6–20)
CALCIUM SERPL-MCNC: 9.1 MG/DL (ref 8.7–10.5)
CHLORIDE SERPL-SCNC: 104 MMOL/L (ref 95–110)
CO2 SERPL-SCNC: 25 MMOL/L (ref 23–29)
CREAT SERPL-MCNC: 0.9 MG/DL (ref 0.5–1.4)
CRP SERPL-MCNC: 30.7 MG/L (ref 0–8.2)
DIFFERENTIAL METHOD BLD: NORMAL
EOSINOPHIL # BLD AUTO: 0.1 K/UL (ref 0–0.5)
EOSINOPHIL NFR BLD: 0.9 % (ref 0–8)
ERYTHROCYTE [DISTWIDTH] IN BLOOD BY AUTOMATED COUNT: 13.6 % (ref 11.5–14.5)
ERYTHROCYTE [SEDIMENTATION RATE] IN BLOOD BY PHOTOMETRIC METHOD: 43 MM/HR (ref 0–36)
EST. GFR  (NO RACE VARIABLE): >60 ML/MIN/1.73 M^2
GLUCOSE SERPL-MCNC: 92 MG/DL (ref 70–110)
HCT VFR BLD AUTO: 37.5 % (ref 37–48.5)
HGB BLD-MCNC: 12.3 G/DL (ref 12–16)
IMM GRANULOCYTES # BLD AUTO: 0.02 K/UL (ref 0–0.04)
IMM GRANULOCYTES NFR BLD AUTO: 0.2 % (ref 0–0.5)
LYMPHOCYTES # BLD AUTO: 2.7 K/UL (ref 1–4.8)
LYMPHOCYTES NFR BLD: 31 % (ref 18–48)
MCH RBC QN AUTO: 27.3 PG (ref 27–31)
MCHC RBC AUTO-ENTMCNC: 32.8 G/DL (ref 32–36)
MCV RBC AUTO: 83 FL (ref 82–98)
MONOCYTES # BLD AUTO: 0.6 K/UL (ref 0.3–1)
MONOCYTES NFR BLD: 6.4 % (ref 4–15)
NEUTROPHILS # BLD AUTO: 5.3 K/UL (ref 1.8–7.7)
NEUTROPHILS NFR BLD: 61.3 % (ref 38–73)
NRBC BLD-RTO: 0 /100 WBC
PLATELET # BLD AUTO: 398 K/UL (ref 150–450)
PMV BLD AUTO: 9.5 FL (ref 9.2–12.9)
POTASSIUM SERPL-SCNC: 3.8 MMOL/L (ref 3.5–5.1)
PROT SERPL-MCNC: 7.5 G/DL (ref 6–8.4)
RBC # BLD AUTO: 4.5 M/UL (ref 4–5.4)
SODIUM SERPL-SCNC: 137 MMOL/L (ref 136–145)
WBC # BLD AUTO: 8.59 K/UL (ref 3.9–12.7)

## 2024-11-22 PROCEDURE — 80053 COMPREHEN METABOLIC PANEL: CPT | Performed by: STUDENT IN AN ORGANIZED HEALTH CARE EDUCATION/TRAINING PROGRAM

## 2024-11-22 PROCEDURE — 86140 C-REACTIVE PROTEIN: CPT | Performed by: STUDENT IN AN ORGANIZED HEALTH CARE EDUCATION/TRAINING PROGRAM

## 2024-11-22 PROCEDURE — 85652 RBC SED RATE AUTOMATED: CPT | Performed by: STUDENT IN AN ORGANIZED HEALTH CARE EDUCATION/TRAINING PROGRAM

## 2024-11-22 PROCEDURE — 36415 COLL VENOUS BLD VENIPUNCTURE: CPT | Performed by: STUDENT IN AN ORGANIZED HEALTH CARE EDUCATION/TRAINING PROGRAM

## 2024-11-22 PROCEDURE — 85025 COMPLETE CBC W/AUTO DIFF WBC: CPT | Performed by: STUDENT IN AN ORGANIZED HEALTH CARE EDUCATION/TRAINING PROGRAM

## 2024-11-29 ENCOUNTER — TELEPHONE (OUTPATIENT)
Dept: RHEUMATOLOGY | Facility: CLINIC | Age: 39
End: 2024-11-29
Payer: COMMERCIAL

## 2024-11-29 ENCOUNTER — OFFICE VISIT (OUTPATIENT)
Dept: RHEUMATOLOGY | Facility: CLINIC | Age: 39
End: 2024-11-29
Payer: COMMERCIAL

## 2024-11-29 VITALS
BODY MASS INDEX: 44.07 KG/M2 | HEIGHT: 68 IN | HEART RATE: 71 BPM | DIASTOLIC BLOOD PRESSURE: 80 MMHG | WEIGHT: 290.81 LBS | SYSTOLIC BLOOD PRESSURE: 122 MMHG

## 2024-11-29 DIAGNOSIS — L40.50 PSORIATIC ARTHRITIS: Primary | ICD-10-CM

## 2024-11-29 DIAGNOSIS — D84.821 IMMUNODEFICIENCY DUE TO DRUG THERAPY: ICD-10-CM

## 2024-11-29 DIAGNOSIS — Z79.60 ENCOUNTER FOR MONITORING IMMUNOSUPPRESSIVE MEDICATION THERAPY CAUSING IMMUNODEFICIENCY: ICD-10-CM

## 2024-11-29 DIAGNOSIS — M79.7 FIBROMYALGIA: ICD-10-CM

## 2024-11-29 DIAGNOSIS — Z79.899 IMMUNODEFICIENCY DUE TO DRUG THERAPY: ICD-10-CM

## 2024-11-29 DIAGNOSIS — D84.821 ENCOUNTER FOR MONITORING IMMUNOSUPPRESSIVE MEDICATION THERAPY CAUSING IMMUNODEFICIENCY: ICD-10-CM

## 2024-11-29 DIAGNOSIS — Z51.81 ENCOUNTER FOR MONITORING IMMUNOSUPPRESSIVE MEDICATION THERAPY CAUSING IMMUNODEFICIENCY: ICD-10-CM

## 2024-11-29 DIAGNOSIS — Z79.899 HIGH RISK MEDICATION USE: ICD-10-CM

## 2024-11-29 PROCEDURE — 99999 PR PBB SHADOW E&M-EST. PATIENT-LVL IV: CPT | Mod: PBBFAC,,, | Performed by: STUDENT IN AN ORGANIZED HEALTH CARE EDUCATION/TRAINING PROGRAM

## 2024-11-29 RX ORDER — ESTRADIOL 0.05 MG/D
1 FILM, EXTENDED RELEASE TRANSDERMAL
COMMUNITY
Start: 2024-11-14

## 2024-11-29 RX ORDER — PREGABALIN 50 MG/1
CAPSULE ORAL
Qty: 90 CAPSULE | Refills: 3 | Status: SHIPPED | OUTPATIENT
Start: 2024-11-29

## 2024-11-29 RX ORDER — RISANKIZUMAB-RZAA 150 MG/ML
INJECTION SUBCUTANEOUS
Qty: 1 ML | Refills: 0 | Status: SHIPPED | OUTPATIENT
Start: 2024-11-29

## 2024-11-29 RX ORDER — SPIRONOLACTONE 50 MG/1
1 TABLET, FILM COATED ORAL EVERY MORNING
COMMUNITY
Start: 2024-11-04

## 2024-11-29 RX ORDER — PREGABALIN 75 MG/1
CAPSULE ORAL
Qty: 90 CAPSULE | Refills: 3 | Status: SHIPPED | OUTPATIENT
Start: 2024-11-29

## 2024-11-29 RX ORDER — CLONIDINE HYDROCHLORIDE 0.1 MG/1
1 TABLET ORAL NIGHTLY
COMMUNITY
Start: 2024-11-11

## 2024-11-29 RX ORDER — RISANKIZUMAB-RZAA 150 MG/ML
150 INJECTION SUBCUTANEOUS
Qty: 1 ML | Refills: 3 | Status: SHIPPED | OUTPATIENT
Start: 2024-11-29

## 2024-11-29 NOTE — TELEPHONE ENCOUNTER
----- Message from Mayda Rice MD sent at 11/29/2024  2:18 PM CST -----  Please start Skyrizi for psoriatic arthritis

## 2024-11-29 NOTE — PROGRESS NOTES
"Subjective:      Patient ID: Bee Condon is a 39 y.o. female.    Chief Complaint: psoriatic arthritis.    HPI:   Patient presents for Rheumatology follow up for psoriatic arthritis. She is on Tremfya since 03/2024. Endorses ongoing joint pain in all joints of hands and dorsum of hands. Some hand weakness as well. Symptoms might be worse with activity like crocheting. Some knee pain and dorsal foot pain with mechanical features. Denies joint swelling, significant stiffness, skin rashes, oral ulcers, patchy alopecia, sicca symptoms, cardiopulmonary symptoms, eye inflammation, IBD, fevers, weight loss, Raynaud's. Rheumatology review of systems is otherwise negative.    Also deals with fibromyalgia, moderate JASEN, chronic low back pain with right sided sciatica. Lyrica helps but doesn't resolve the symptoms. Also on LDN. Had serotonin syndrome with Cymbata so will avoid. Saw Psychiatry for depression and anxiety.    PsA history:  PsA diagnosed by Dr. BEST in 2022 due to elevated inflammatory markers, negative RF, history of rash (eczema or psoriasis), father with psoriasis, and migratory inflammatory arthritis. Also endorses episodes of dactylitis in the past. History of swelling of PIP joints, lor left hand, which resolved with DMARDs. Dactylitis resolved, no recent episodes. Denies history of enthesitis, eye inflammation. Never had psoriasis. Had a lot of eczema  previously and well managed by avoiding triggers. Has had serotonin syndrome. Sees a Psychiatrist for major depression and anxiety. Has had suicidal thoughts in the past, none presently.     Objective:   /80 (BP Location: Right arm, Patient Position: Sitting)   Pulse 71   Ht 5' 8" (1.727 m)   Wt 131.9 kg (290 lb 12.6 oz)   BMI 44.21 kg/m²   Physical Exam  Constitutional:       General: She is not in acute distress.     Appearance: Normal appearance. She is obese.   HENT:      Head: Normocephalic and atraumatic.      Mouth/Throat:      Mouth: " Mucous membranes are moist.      Pharynx: Oropharynx is clear.   Cardiovascular:      Rate and Rhythm: Normal rate and regular rhythm.   Pulmonary:      Effort: Pulmonary effort is normal.      Breath sounds: Normal breath sounds.   Abdominal:      Palpations: Abdomen is soft.      Tenderness: There is no abdominal tenderness.   Musculoskeletal:         General: Tenderness present. No swelling.      Cervical back: Normal range of motion. No tenderness.   Skin:     General: Skin is warm and dry.   Neurological:      Mental Status: She is alert and oriented to person, place, and time. Mental status is at baseline.               Assessment and Plan:     Problem List Items Addressed This Visit          Unprioritized    Fibromyalgia    Relevant Medications    pregabalin (LYRICA) 50 MG capsule    pregabalin (LYRICA) 75 MG capsule     Other Visit Diagnoses       Psoriatic arthritis    -  Primary    Relevant Orders    CBC Auto Differential    Comprehensive Metabolic Panel    C-Reactive Protein    Sedimentation rate    Hepatitis B Core Antibody, Total    Hepatitis B Surface Antigen    Quantiferon Gold TB    Encounter for monitoring immunosuppressive medication therapy causing immunodeficiency        Relevant Orders    CBC Auto Differential    Comprehensive Metabolic Panel    C-Reactive Protein    Sedimentation rate    Hepatitis B Core Antibody, Total    Hepatitis B Surface Antigen    Quantiferon Gold TB    Immunodeficiency due to drug therapy        Relevant Orders    CBC Auto Differential    Comprehensive Metabolic Panel    C-Reactive Protein    Sedimentation rate    Hepatitis B Core Antibody, Total    Hepatitis B Surface Antigen    Quantiferon Gold TB    High risk medication use        Relevant Orders    CBC Auto Differential    Comprehensive Metabolic Panel    C-Reactive Protein    Sedimentation rate    Hepatitis B Core Antibody, Total    Hepatitis B Surface Antigen    Quantiferon Gold TB           Latest Reference Range  & Units 11/22/24 08:54   WBC 3.90 - 12.70 K/uL 8.59   RBC 4.00 - 5.40 M/uL 4.50   Hemoglobin 12.0 - 16.0 g/dL 12.3   Hematocrit 37.0 - 48.5 % 37.5   MCV 82 - 98 fL 83   MCH 27.0 - 31.0 pg 27.3   MCHC 32.0 - 36.0 g/dL 32.8   RDW 11.5 - 14.5 % 13.6   Platelet Count 150 - 450 K/uL 398   MPV 9.2 - 12.9 fL 9.5   Gran % 38.0 - 73.0 % 61.3   Lymph % 18.0 - 48.0 % 31.0   Mono % 4.0 - 15.0 % 6.4   Eos % 0.0 - 8.0 % 0.9   Basophil % 0.0 - 1.9 % 0.2   Immature Granulocytes 0.0 - 0.5 % 0.2   Gran # (ANC) 1.8 - 7.7 K/uL 5.3   Lymph # 1.0 - 4.8 K/uL 2.7   Mono # 0.3 - 1.0 K/uL 0.6   Eos # 0.0 - 0.5 K/uL 0.1   Baso # 0.00 - 0.20 K/uL 0.02   Immature Grans (Abs) 0.00 - 0.04 K/uL 0.02   nRBC 0 /100 WBC 0   Differential Method  Automated   Sed Rate 0 - 36 mm/Hr 43 (H)   Sodium 136 - 145 mmol/L 137   Potassium 3.5 - 5.1 mmol/L 3.8   Chloride 95 - 110 mmol/L 104   CO2 23 - 29 mmol/L 25   Anion Gap 8 - 16 mmol/L 8   BUN 6 - 20 mg/dL 14   Creatinine 0.5 - 1.4 mg/dL 0.9   eGFR >60 mL/min/1.73 m^2 >60.0   Glucose 70 - 110 mg/dL 92   Calcium 8.7 - 10.5 mg/dL 9.1   ALP 40 - 150 U/L 84   PROTEIN TOTAL 6.0 - 8.4 g/dL 7.5   Albumin 3.5 - 5.2 g/dL 3.6   BILIRUBIN TOTAL 0.1 - 1.0 mg/dL 0.2   AST 10 - 40 U/L 21   ALT 10 - 44 U/L 18   CRP 0.0 - 8.2 mg/L 30.7 (H)   (H): Data is abnormally high    X-Ray Foot Complete Bilateral  Order: [id number]  Status: Final result       Visible to patient: Yes (seen)       Next appt: 12/11/2024 at 09:40 AM in Pulmonology (Brooke Mazariegos MD)       Dx: Heel pain, bilateral    0 Result Notes  Details    Reading Physician Reading Date Result Priority   Yogi Stevenson MD  [phone] 10/26/2022 Routine     Narrative & Impression  EXAMINATION:  XR FOOT COMPLETE 3 VIEW BILATERAL     CLINICAL HISTORY:  Pain in right foot     TECHNIQUE:  AP, lateral, and oblique views of both feet were performed.     COMPARISON:  None     FINDINGS:  No acute osseous abnormality.  Bilateral mild hallux valgus deformity with 1st  metatarsal median eminence prominence.  Minimal early bilateral calcaneal enthesophyte changes.  Soft tissues unremarkable.     Impression:     As above        Electronically signed by:Yogi Stevenson MD  Date:                                            10/26/2022  Time:                                           10:35           Patient presents for Rheumatology follow up for psoriatic arthritis. Also has fibromyalgia.    All of the data above and below has been reviewed by myself and any further interpretations will be reflected in the assessment and plan.   The data includes review of external notes, and independent interpretation of lab results, x-rays, and imaging reports.      Psoriatic arthritis  With inflammatory arthritis, dactylitis, negative RF, elevated inflammatory markers. Rash might be more eczema. Family history of psoriasis.    Previous Treatment:   - MTX - intolerance - severe fatigue  - Humira - s/e - rash and feeling sick for several days  - Rinvoq - effective but GI upset  - Cosentyx - ineffective  - Stelara - ineffective  - Tremfya - ineffective      Flare of polyarticular joint pain. No synovitis. Elevated ESR and CRP (chronic).    Plan:  Stop Tremfya and start Skyrizi.  Safety and disease monitoring labs in 4 months with follow up.  Emphasized weight loss to improve some of her more mechanical type pain as well as decrease systemic inflammation.        Fibromyalgia   Anxiety, depression, history serotonin syndrome.  Insomnia  Uncontrolled with increased fatigue, widespread pain, central sensitivity symptoms, symptom severity score.    Cymbalta - serotonin syndrome    Continue LDN  Increase Lyrica to 50 mg AM and 75 mg PM.  Follow up with Psychiatry for respective diagnoses and Sleep Clinic for JASEN.        High Risk Medication Monitoring encounter  Drug therapy requiring intensive monitoring for toxicity  No current medication related issues, no evidence of toxicity  Appropriate labs ordered  for toxicity monitoring    Compromised immune system secondary to autoimmune disease and/or use of immunosuppressive drugs.  Monitor carefully for infections.  Advised patient to get immediate medical care if any infection arises.  Also advised strict adherence age-appropriate vaccinations and cancer screenings with PCP.    Patient advised to hold DMARD and/or biologic therapy for signs of infection or for surgery. If you are unsure what to do please call our office for instruction.[facility] [phone]          Follow up in about 4 months (around 3/29/2025).         I spent a total of 30 minutes on the day of the visit.  This includes face to face time and non-face to face time preparing to see the patient (eg, review of tests), obtaining and/or reviewing separately obtained history, documenting clinical information in the electronic or other health record, independently interpreting results and communicating results to the patient/family/caregiver, or care coordinator.      Today's visit is associated with current or anticipated ongoing medical care related to this patient's diagnosis of psoriatic arthritis. Psoriatic arthritis is a chronic disease which will require regular follow up to manage symptoms and progression. The patient is to return to the office within a minimum of 3-6 months to review symptoms and function at that time. CPT code

## 2024-12-05 NOTE — TELEPHONE ENCOUNTER
Referral to Neurology? Or advise to see PCP? Please advise. Thanks.    Testosterone 200 mg/ml injection given in right ventrogluteal. Patient aware to return to clinic in 14 days.. Patient tolerated without incident. See MAR for documentation.

## 2024-12-11 ENCOUNTER — OFFICE VISIT (OUTPATIENT)
Dept: PULMONOLOGY | Facility: CLINIC | Age: 39
End: 2024-12-11
Payer: COMMERCIAL

## 2024-12-11 VITALS
RESPIRATION RATE: 13 BRPM | SYSTOLIC BLOOD PRESSURE: 120 MMHG | HEIGHT: 68 IN | BODY MASS INDEX: 44.17 KG/M2 | WEIGHT: 291.44 LBS | OXYGEN SATURATION: 100 % | HEART RATE: 80 BPM | DIASTOLIC BLOOD PRESSURE: 76 MMHG

## 2024-12-11 DIAGNOSIS — Z78.9 INTOLERANCE OF CONTINUOUS POSITIVE AIRWAY PRESSURE (CPAP) VENTILATION: ICD-10-CM

## 2024-12-11 DIAGNOSIS — F51.04 CHRONIC INSOMNIA: ICD-10-CM

## 2024-12-11 DIAGNOSIS — R06.83 SNORING: ICD-10-CM

## 2024-12-11 DIAGNOSIS — G47.33 MODERATE OBSTRUCTIVE SLEEP APNEA: Primary | ICD-10-CM

## 2024-12-11 PROCEDURE — 1160F RVW MEDS BY RX/DR IN RCRD: CPT | Mod: CPTII,S$GLB,, | Performed by: INTERNAL MEDICINE

## 2024-12-11 PROCEDURE — 3044F HG A1C LEVEL LT 7.0%: CPT | Mod: CPTII,S$GLB,, | Performed by: INTERNAL MEDICINE

## 2024-12-11 PROCEDURE — 1159F MED LIST DOCD IN RCRD: CPT | Mod: CPTII,S$GLB,, | Performed by: INTERNAL MEDICINE

## 2024-12-11 PROCEDURE — 99214 OFFICE O/P EST MOD 30 MIN: CPT | Mod: S$GLB,,, | Performed by: INTERNAL MEDICINE

## 2024-12-11 PROCEDURE — 99999 PR PBB SHADOW E&M-EST. PATIENT-LVL V: CPT | Mod: PBBFAC,,, | Performed by: INTERNAL MEDICINE

## 2024-12-11 PROCEDURE — 3078F DIAST BP <80 MM HG: CPT | Mod: CPTII,S$GLB,, | Performed by: INTERNAL MEDICINE

## 2024-12-11 PROCEDURE — 3074F SYST BP LT 130 MM HG: CPT | Mod: CPTII,S$GLB,, | Performed by: INTERNAL MEDICINE

## 2024-12-11 PROCEDURE — 3008F BODY MASS INDEX DOCD: CPT | Mod: CPTII,S$GLB,, | Performed by: INTERNAL MEDICINE

## 2024-12-11 RX ORDER — ONDANSETRON 8 MG/1
8 TABLET, ORALLY DISINTEGRATING ORAL EVERY 8 HOURS PRN
COMMUNITY
Start: 2024-11-13

## 2024-12-11 RX ORDER — VALERIAN ROOT 500 MG
CAPSULE ORAL
COMMUNITY
Start: 2024-11-01

## 2024-12-11 RX ORDER — ESOMEPRAZOLE MAGNESIUM 40 MG/1
CAPSULE, DELAYED RELEASE ORAL
COMMUNITY

## 2024-12-11 RX ORDER — CYANOCOBALAMIN (VITAMIN B-12) 1500 MCG
TABLET,CHEWABLE ORAL
COMMUNITY
Start: 2024-11-01

## 2024-12-11 RX ORDER — METHENAMINE, SODIUM PHOSPHATE, MONOBASIC, MONOHYDRATE, PHENYL SALICYLATE, METHYLENE BLUE, AND HYOSCYAMINE SULFATE 118; 40.8; 36; 10; .12 MG/1; MG/1; MG/1; MG/1; MG/1
1 CAPSULE ORAL
COMMUNITY
Start: 2024-07-16

## 2024-12-11 NOTE — PATIENT INSTRUCTIONS
Referral for dental device for sleep apnea.       Dr Goss         [address] [place], [place], [place]     [phone]        Dr. Kurt A. LeJeune    [facility] [place] [address]    Phone:[phone]    Dr Garay     [facility]    [address] [address] [phone]    Dr. Jared Magaña     [facility], [facility]# [address], [place] [phone]

## 2024-12-11 NOTE — PROGRESS NOTES
Pulmonary Outpatient Follow Up Visit     Subjective:       Patient ID: Bee Condon is a 39 y.o. female.    Chief Complaint: Snoring      HPI        39-year-old female patient presenting to discuss home sleep study results.      Positional mild JASEN with RDI of 16 nighttime O2 saturation of 88%.      Blakeslee Sleepiness scale score 14.    Has sleep onset insomnia.    Pain with psoriatic arthritis    Review of Systems   Respiratory:  Positive for apnea, snoring and somnolence.    Psychiatric/Behavioral:  Positive for sleep disturbance.        Outpatient Encounter Medications as of 2024   Medication Sig Dispense Refill    ashlandondha extract 500 mg Cap       methen-joleenblue-s.phos-phsal-hyo (URIBEL) 118-10-40.8-36 mg Cap Take 1 capsule by mouth.      ondansetron (ZOFRAN-ODT) 8 MG TbDL Take 8 mg by mouth every 8 (eight) hours as needed.      valerian root 500 mg Cap       azelastine (ASTELIN) 137 mcg (0.1 %) nasal spray 1-2 SEN BID PRN      cetirizine (ZYRTEC) 10 MG tablet Take 10 mg by mouth.      cloNIDine (CATAPRES) 0.1 MG tablet Take 1 tablet by mouth every evening.      d-mannose 500 mg Cap Take by mouth.      esomeprazole (NEXIUM) 40 MG capsule SMARTSI.0 Capsule(s) By Mouth Twice Daily      estradiol 0.05 mg/24 hr td ptsw (VIVELLE-DOT) 0.05 mg/24 hr Place 1 patch onto the skin twice a week.      [] eszopiclone (LUNESTA) 1 MG Tab Take 1 tablet (1 mg total) by mouth nightly. 30 tablet 0    hydroCHLOROthiazide (HYDRODIURIL) 25 MG tablet Take 25 mg by mouth.      levocetirizine (XYZAL) 5 MG tablet Take 5 mg by mouth every evening.      naltrexone capsule 7 mg qhs 90 capsule 1    pregabalin (LYRICA) 50 MG capsule Use 50 mg in the AM and 75 mg in the PM. 90 capsule 3    pregabalin (LYRICA) 75 MG capsule Use 50 mg in the AM and 75 mg in the PM. 90 capsule 3    risankizumab-rzaa (SKYRIZI) 150 mg/mL PnIj Inject 150 mg/1 mL SQ at week 0. Then inject 150 mg/1 mL  "into SQ at week 4. Then inject 150 mg/1 mL SQ every 12 weeks thereafter. 1 mL 0    risankizumab-rzaa (SKYRIZI) 150 mg/mL PnIj Inject 150 mg into the skin every 12 weeks. 1 mL 3    rizatriptan (MAXALT) 10 MG tablet Take by mouth.      sodium chloride 2.65 % SprA 1 spray by Nasal route.      spironolactone (ALDACTONE) 50 MG tablet Take 1 tablet by mouth every morning.      sucralfate (CARAFATE) 1 gram tablet TAKE 1 TABLET(1 GRAM) BY MOUTH FOUR TIMES DAILY BEFORE MEALS AND AT NIGHT 120 tablet 0    vilazodone (VIIBRYD) 20 mg Tab Take 1 tablet by mouth.      [DISCONTINUED] estrogens,esterified,-methyltestosterone 1.25-2.5mg (ESTRATEST) per tablet Take by mouth.      [DISCONTINUED] guselkumab (TREMFYA) 100 mg/mL AtIn Inject 100 mg into the skin every 8 weeks. 1 each 3    [DISCONTINUED] pregabalin (LYRICA) 50 MG capsule Take 1 capsule (50 mg total) by mouth 2 (two) times daily. 60 capsule 5     No facility-administered encounter medications on file as of 12/11/2024.       Objective:     Vital Signs (Most Recent)  Vital Signs  Pulse: 80  Resp: 13  SpO2: 100 %  BP: 120/76  Pain Score:   4  Pain Loc: Hand  Height and Weight  Height: 5' 8" (172.7 cm)  Weight: 132.2 kg (291 lb 7.2 oz)  BSA (Calculated - sq m): 2.52 sq meters  BMI (Calculated): 44.3  Weight in (lb) to have BMI = 25: 164.1]  Wt Readings from Last 2 Encounters:   12/11/24 132.2 kg (291 lb 7.2 oz)   11/29/24 131.9 kg (290 lb 12.6 oz)       Physical Exam   Constitutional: She is oriented to person, place, and time. She appears well-developed.   Pulmonary/Chest: No respiratory distress.   Neurological: She is alert and oriented to person, place, and time.   Psychiatric: Her behavior is normal.       Laboratory  Lab Results   Component Value Date    WBC 8.59 11/22/2024    RBC 4.50 11/22/2024    HGB 12.3 11/22/2024    HCT 37.5 11/22/2024    MCV 83 11/22/2024    MCH 27.3 11/22/2024    MCHC 32.8 11/22/2024    RDW 13.6 11/22/2024     11/22/2024    MPV 9.5 " "11/22/2024    GRAN 5.3 11/22/2024    GRAN 61.3 11/22/2024    LYMPH 2.7 11/22/2024    LYMPH 31.0 11/22/2024    MONO 0.6 11/22/2024    MONO 6.4 11/22/2024    EOS 0.1 11/22/2024    BASO 0.02 11/22/2024    EOSINOPHIL 0.9 11/22/2024    BASOPHIL 0.2 11/22/2024       BMP  Lab Results   Component Value Date     11/22/2024    K 3.8 11/22/2024     11/22/2024    CO2 25 11/22/2024    BUN 14 11/22/2024    CREATININE 0.9 11/22/2024    CALCIUM 9.1 11/22/2024    ANIONGAP 8 11/22/2024    ESTGFRAFRICA >60 04/13/2022    EGFRNONAA >60 04/13/2022    AST 21 11/22/2024    ALT 18 11/22/2024    PROT 7.5 11/22/2024       No results found for: "BNP"    Lab Results   Component Value Date    TSH 0.88 10/02/2024       Lab Results   Component Value Date    SEDRATE 43 (H) 11/22/2024       Lab Results   Component Value Date    CRP 30.7 (H) 11/22/2024     No results found for: "IGE"     No results found for: "ASPERGILLUS"  No results found for: "AFUMIGATUSCL"     No results found for: "ACE"     Diagnostic Results:  I have personally reviewed today the following studies:      Assessment/Plan:   Moderate obstructive sleep apnea  -     Ambulatory referral/consult to Dentistry; Future; Expected date: 12/18/2024    Intolerance of continuous positive airway pressure (CPAP) ventilation  -     Ambulatory referral/consult to Dentistry; Future; Expected date: 12/18/2024    Snoring  -     Ambulatory referral/consult to Dentistry; Future; Expected date: 12/18/2024    Chronic insomnia  -     Ambulatory referral/consult to Behavioral Health; Future; Expected date: 12/18/2024        She returned the CPAP machine.  Discussed oral appliance therapy.  Interested in proceeding with evaluation.      Provided with a copy of her sleep study at dental referral and a list of providers.      Interested in CBT I therapy for insomnia.    Follow up in about 3 months (around 3/11/2025).    This note was prepared using voice recognition system and is likely to have " sound alike errors that may have been overlooked even after proof reading.  Please call me with any questions    Discussed diagnosis, its evaluation, treatment and usual course. All questions answered.      Brooke Mazariegos MD

## 2024-12-12 ENCOUNTER — PATIENT MESSAGE (OUTPATIENT)
Dept: PSYCHIATRY | Facility: CLINIC | Age: 39
End: 2024-12-12
Payer: COMMERCIAL

## 2024-12-30 ENCOUNTER — TELEPHONE (OUTPATIENT)
Dept: PSYCHIATRY | Facility: CLINIC | Age: 39
End: 2024-12-30
Payer: COMMERCIAL

## 2024-12-30 NOTE — TELEPHONE ENCOUNTER
----- Message from Joseline sent at 12/30/2024  1:20 PM CST -----  Contact: Bee  .Type:  Patient Returning Call    Who Called:Bee   Who Left Message for Patient:nurse   Does the patient know what this is regarding?: appt   Would the patient rather a call back or a response via [facility]? Call   Best Call Back Number:.[phone]   Additional Information: Pt requesting a call back

## 2025-01-06 ENCOUNTER — PATIENT MESSAGE (OUTPATIENT)
Dept: RHEUMATOLOGY | Facility: CLINIC | Age: 40
End: 2025-01-06
Payer: COMMERCIAL

## 2025-01-06 ENCOUNTER — PATIENT MESSAGE (OUTPATIENT)
Dept: PULMONOLOGY | Facility: CLINIC | Age: 40
End: 2025-01-06
Payer: COMMERCIAL

## 2025-01-06 DIAGNOSIS — M54.30 SCIATICA, UNSPECIFIED LATERALITY: Primary | ICD-10-CM

## 2025-01-07 ENCOUNTER — PATIENT MESSAGE (OUTPATIENT)
Dept: RHEUMATOLOGY | Facility: CLINIC | Age: 40
End: 2025-01-07
Payer: COMMERCIAL

## 2025-01-07 DIAGNOSIS — L40.50 PSORIATIC ARTHRITIS: Primary | ICD-10-CM

## 2025-01-07 NOTE — TELEPHONE ENCOUNTER
Patient would like an external referral to [facility] and it needs to say she is being treated for Psoriatic Arthritis

## 2025-01-08 ENCOUNTER — CLINICAL SUPPORT (OUTPATIENT)
Dept: REHABILITATION | Facility: HOSPITAL | Age: 40
End: 2025-01-08
Attending: STUDENT IN AN ORGANIZED HEALTH CARE EDUCATION/TRAINING PROGRAM
Payer: COMMERCIAL

## 2025-01-08 DIAGNOSIS — Z74.09 DECREASED FUNCTIONAL MOBILITY AND ENDURANCE: Primary | ICD-10-CM

## 2025-01-08 DIAGNOSIS — M53.86 DECREASED RANGE OF MOTION OF LUMBAR SPINE: ICD-10-CM

## 2025-01-08 DIAGNOSIS — R26.89 POOR BALANCE: ICD-10-CM

## 2025-01-08 DIAGNOSIS — M62.81 PROXIMAL MUSCLE WEAKNESS: ICD-10-CM

## 2025-01-08 DIAGNOSIS — M54.30 SCIATICA, UNSPECIFIED LATERALITY: ICD-10-CM

## 2025-01-08 PROCEDURE — 97162 PT EVAL MOD COMPLEX 30 MIN: CPT

## 2025-01-08 PROCEDURE — 97535 SELF CARE MNGMENT TRAINING: CPT

## 2025-01-08 NOTE — PLAN OF CARE
[facility]   Physical Therapy Initial Evaluation      Date: 1/8/2025   Name: Bee Condon  Clinic Number: [id number]    Therapy Diagnosis:    Encounter Diagnoses   Name Primary?    Sciatica, unspecified laterality     Decreased functional mobility and endurance Yes    Decreased range of motion of lumbar spine     Poor balance     Proximal muscle weakness       Physician: Mayda Rice MD     Physician Orders: PT Eval and Treat  Medical Diagnosis from Referral: Sciatica, unspecified laterality [M54.30]   Evaluation Date: 1/8/2025  Authorization Period Expiration: 1/8/2026   Plan of Care Expiration: 4/8/2025  Progress Note Due: 2/8/2025  Visit # / Visits authorized: 1/1  FOTO: 1/3 (last performed on 1/8/2025)    Precautions: Standard    Time In: 1333  Time Out: 1225  Total Billable Time (timed & untimed codes): 50 minutes    Subjective     Date of onset: chronic for ~1.5 years.    History of current condition - Bee reports she has been dealing with this pain in her right hip for ~1.5 years. Feels like her piriformis is super tight and she cannot cross her right ankle over her right knee. She does piriformis stretches which helped a lot initially but now does not notice a huge change afterward. She gets numbness down the leg which worsens whenever she stretches. Feels her symptoms are not as acute but are now spreading more distally down the leg. Sensation in the lateral ankle has been diminished for the last 4-6 months. Has psoriatic arthritis and fibromyalgia and so has some issues with the low back but states this is not bad. Has started back walking and stretching her hip and low back for ~6 months. Exercise historically causes flares for her. Sleeps on her left side with a pregnancy pillow between her legs     Used to work out a lot and had sciatica in the past. Got rid of it with yoga and stretches.       Imaging: [x] Xray [] MRI [] CT: Performed on: lumbar spine on  24  [Impression: Unremarkable study.]    Pain:  Current 5/10, worst 10/10, best 3/10   Location: [x] Right   [] Left:  lower extremity   Description: numbness and tingling into the posterolateral upper leg, lower leg and ankle as well as side and top of the foot. Tightness in the piriformis region   Aggravating Factors: prolonged sitting or laying on her right side  Easing Factors: activity avoidance, rest, ibuprofen occasionally but has a sensitive stomach, massage gun    Prior Therapy:   [x] N/A    [] Yes:   Social History: Pt lives with their family  Occupation: Pt was a  but recently quit her job   Prior Level of Function:  Independent with all ADL, IADL, community mobility and functional activities with reports of increased pain and need for increased time and frequent breaks. Most of her issues are due to gross fatigue  Current Level of Function: Independent with all ADL, IADL, community mobility and functional activities with reports of increased pain and need for increased time and frequent breaks. Has difficulty with activities such as vacuuming, mopping and sweeping and can only do this for a short period of time before having to stop. Recently started being able to wash dishes again. Difficulty showering when her hip is already hurting     Dominant Extremity:    [x] Right    [] Left    Pts goals: Pt reported goals are to decrease overall pain levels in order to return to prior functional level.     Medical History:   Past Medical History:   Diagnosis Date    Anxiety     Depression     Interstitial cystitis 2019       Surgical History:   Bee Condon  has a past surgical history that includes  section; Tubal ligation; and Esophagogastroduodenoscopy (N/A, 5/10/2024).    Medications:   Bee has a current medication list which includes the following prescription(s): ashwagandha extract, azelastine, cetirizine, clonidine, d-mannose, esomeprazole, estradiol 0.05 mg/24 hr  td ptsw, hydrochlorothiazide, levocetirizine, uribel, naltrexone, ondansetron, pregabalin, pregabalin, skyrizi, skyrizi, rizatriptan, sodium chloride, spironolactone, sucralfate, valerian root, and vilazodone.    Allergies:   Review of patient's allergies indicates:   Allergen Reactions    Iodine     Oxycodone-acetaminophen Hives and Itching    Shellfish containing products         Objective        RANGE OF MOTION:   Lumbar ROM Right  (spine) Left   Pain/Dysfunction with Movement Goal   Lumbar Flexion (60º) 100% ---     Lumbar Extension (30º) 100% --- Pain in the R hip     Lumbar Side Bending (25º) 25% 100% Pain in R posterior hip and wrapping around to groin 75%   Lumbar Rotation 75% 75% Pulling in the hip         Hip AROM/PROM Right Left Pain/Dysfunction with Movement Goal   Hip Flexion (120º) 110 110 Body habitus     Hip Extension (30º) 5 5     Hip Abduction (45º) 45 45     Hip IR (45º) 35 35     Hip ER (45º) 45 45             STRENGTH: (* denotes pain)  L/E MMT Right  (spine) Left Dysfunction with Movement Goal   Modified (90/90) Abdominal Strength   ---     Hip Flexion  4/5 4+/5  4+/5 B   Hip Extension     4+/5 B   Hip Abduction     4+/5 B   Knee Extension 5/5 5/5  5/5 B   Knee Flexion 5/5 5/5  5/5 B   Hip IR 3+/5 4/5  4+/5 B   Hip ER 3+/5 4/5  4+/5 B   Ankle DF    5/5 B   Ankle PF    5/5 B   Ankle Inversion    5/5 B   Ankle Eversion    5/5 B                    SPECIAL TESTS:    Right  (spine) Left  Goal   Lumbar Distraction  [] Positive     [] Negative --- Negative B    Lumbar Compression [] Positive     [x] Negative --- Negative B    SLUMP [] Positive     [x] Negative [] Positive     [x] Negative Negative B    Straight Leg Raise [] Positive     [] Negative [] Positive     [] Negative Negative B    ASIS Compression [] Positive     [] Negative --- Negative    ASIS Distraction  [] Positive     [] Negative --- Negative    Posterior Shear [] Positive     [] Negative [] Positive     [] Negative Negative B    YOUNG  [] Positive     [] Negative [] Positive     [] Negative Negative B           SENSATION:  Intact to Light Touch        PALPATION: Increased tenderness to palpation of: right , glutes, piriformis.    POSTURE:  Pt presents with postural abnormalities which include: forward head, rounded shoulders , and increased thoracic kyphosis         GAIT ANALYSIS The patient ambulated with the following assistive device: none; the pt presents with the following gait abnormalities: trendelenburg    FUNCTIONAL MOVEMENT PATTERNS  Movement Analysis Notes   Bed Mobility  DYSFUNCTIONAL, PAINFUL     Transfers     Sit to Stand FUNCTIONAL, PAINFUL     Squat FUNCTIONAL, PAINFUL     Single Leg Squat      Step Down      Single leg calf raises  FUNCTIONAL, NONPAINFUL R: 10/10  L: 10/10       BALANCE:   Right   (seconds) Left  (seconds) Pain/dysfunction Noted Goal   Narrow Base of Support  ---  60+ seconds   Tandem Stance    60+ seconds   Single Leg Stance 30+ 30+ Fatigue in the leg  60+ seconds                Function:     Intake Outcome Measure for FOTO Hip Survey    Therapist reviewed FOTO scores for Bee on 1/8/2025.   FOTO report - see Media section or FOTO account for episode details    Intake Score: 29%         Treatment     Total Treatment time (time-based codes) separate from Evaluation: (10) minutes     Bee received the treatments listed below:            Patient Education and Home Exercises     Education provided: (10) minutes  PURPOSE: Patient educated on the impairments noted above and the effects of physical therapy intervention to improve overall condition and QOL.   EXERCISE: Patient was educated on all the above exercise prior/during/after for proper posture, positioning, and execution for safe performance with home exercise program.   STRENGTH: Patient educated on the importance of improved core and extremity strength in order to improve alignment of the spine and extremities with static positions and dynamic movement.    GAIT & BALANCE: Patient educated on the importance of strong core and lower extremity musculature in order to improve both static and dynamic balance, improve gait mechanics, reduce fall risk and improve household and community mobility.     Written Home Exercises Provided: yes.  Exercises were reviewed and Bee was able to demonstrate them prior to the end of the session.  Bee demonstrated good  understanding of the education provided. See EMR under Patient Instructions for exercises provided during therapy sessions.    Assessment     Bee is a 39 y.o. female referred to outpatient Physical Therapy with a medical diagnosis of Sciatica. Pt presents with impairments in the following categories: IMPAIRMENTS: ROM, strength, balance, gait mechanics, and functional movement patterns    Pt prognosis is Good  Pt will benefit from skilled outpatient Physical Therapy to address the deficits stated above and in the chart below, provide pt/family education, and to maximize pt's level of independence.     Plan of care discussed with patient: Yes  Pt's spiritual, cultural and educational needs considered and patient is agreeable to the plan of care and goals as stated below:     Anticipated Barriers for therapy: co-morbidities, chronicity of condition, and adherence to treatment plan    Medical Necessity is demonstrated by the following  History  Co-morbidities and personal factors that may impact the plan of care [] LOW: no personal factors / co-morbidities  [x] MODERATE: 1-2 personal factors / co-morbidities  [] HIGH: 3+ personal factors / co-morbidities    Moderate / High Support Documentation:   Past Medical History:   Diagnosis Date    Anxiety     Depression     Interstitial cystitis 2019        Examination  Body Structures and Functions, activity limitations and participation restrictions that may impact the plan of care [] LOW: addressing 1-2 elements  [x] MODERATE: 3+ elements  [] HIGH: 4+ elements (please  "support below)    Moderate / High Support Documentation: See above in "Current Level of Function"      Clinical Presentation [] LOW: stable  [x] MODERATE: Evolving  [] HIGH: Unstable     Decision Making/ Complexity Score: moderate         Short Term Goals:  6 weeks Status  Date Met   PAIN: Pt will report worst pain of 7/10 in order to progress toward max functional ability and improve quality of life. [x] Progressing  [] Met  [] Not Met    FUNCTION: Patient will demonstrate improved function as indicated by a score of greater than or equal to 39 out of 100 on FOTO. [x] Progressing  [] Met  [] Not Met    MOBILITY: Patient will improve AROM to 50% of stated goals, listed in objective measures above, in order to progress towards independence with functional activities.  [x] Progressing  [] Met  [] Not Met    STRENGTH: Patient will improve strength to 50% of stated goals, listed in objective measures above, in order to progress towards independence with functional activities. [x] Progressing  [] Met  [] Not Met    HEP: Patient will demonstrate independence with HEP in order to progress toward functional independence. [x] Progressing  [] Met  [] Not Met      Long Term Goals:  12 weeks Status Date Met   PAIN: Pt will report worst pain of 4/10 in order to progress toward max functional ability and improve quality of life [x] Progressing  [] Met  [] Not Met    FUNCTION: Patient will demonstrate improved function as indicated by a score of greater than or equal to 56 out of 100 on FOTO. [x] Progressing  [] Met  [] Not Met    MOBILITY: Patient will improve AROM to stated goals, listed in objective measures above, in order to return to maximal functional potential and improve quality of life.  [x] Progressing  [] Met  [] Not Met    STRENGTH: Patient will improve strength to stated goals, listed in objective measures above, in order to improve functional independence and quality of life.  [x] Progressing  [] Met  [] Not Met  "   GAIT: Patient will demonstrate normalized gait mechanics with minimal compensation in order to return to PLOF. [x] Progressing  [] Met  [] Not Met    Patient will return to normal ADL's, IADL's, community involvement, recreational activities, and work-related activities with less than or equal to 4/10 pain and maximal function.  [x] Progressing  [] Met  [] Not Met      Plan     Plan of care Certification: 1/8/2025 to 4/8/2025.    Outpatient Physical Therapy 2 times weekly for 12 weeks to include any combination of the following interventions: virtual visits, dry needling, modalities, electrical stimulation (IFC, Pre-Mod, Attended with Functional Dry Needling), Cervical/Lumbar Traction, Gait Training, Manual Therapy, Neuromuscular Re-ed, Patient Education, Self Care, Therapeutic Exercise, and Therapeutic Activites     Germania Galeano, PT, DPT

## 2025-01-13 ENCOUNTER — CLINICAL SUPPORT (OUTPATIENT)
Dept: REHABILITATION | Facility: HOSPITAL | Age: 40
End: 2025-01-13
Payer: COMMERCIAL

## 2025-01-13 DIAGNOSIS — M53.86 DECREASED RANGE OF MOTION OF LUMBAR SPINE: ICD-10-CM

## 2025-01-13 DIAGNOSIS — R26.89 POOR BALANCE: ICD-10-CM

## 2025-01-13 DIAGNOSIS — M62.81 PROXIMAL MUSCLE WEAKNESS: ICD-10-CM

## 2025-01-13 DIAGNOSIS — Z74.09 DECREASED FUNCTIONAL MOBILITY AND ENDURANCE: Primary | ICD-10-CM

## 2025-01-13 PROCEDURE — 97140 MANUAL THERAPY 1/> REGIONS: CPT

## 2025-01-13 PROCEDURE — 97112 NEUROMUSCULAR REEDUCATION: CPT

## 2025-01-13 PROCEDURE — 97110 THERAPEUTIC EXERCISES: CPT

## 2025-01-15 NOTE — PROGRESS NOTES
[facility]   Physical Therapy Treatment Note        Name: Bee Condon  Clinic Number: [id number]    Therapy Diagnosis:   Encounter Diagnoses   Name Primary?    Decreased functional mobility and endurance Yes    Decreased range of motion of lumbar spine     Poor balance     Proximal muscle weakness      Physician: Mayda Rice MD    Visit Date: 1/13/2025    Physician Orders: PT Eval and Treat  Medical Diagnosis from Referral: Sciatica, unspecified laterality [M54.30]   Evaluation Date: 1/8/2025  Authorization Period Expiration: 1/8/2026   Plan of Care Expiration: 4/8/2025  Progress Note Due: 2/8/2025  Visit # / Visits authorized: 1/25 (+1 for evaluation)   FOTO: 1/3 (last performed on 1/8/2025)     Precautions: Standard    Time In: 1030  Time Out: 1128  Total Billable Time: 54 minutes (Billing reflects 1 on 1 treatment time spent with patient)    Subjective     Patient reports: She is feeling very stiff today. No significant changes in overall symptoms .    He/She was compliant with home exercise program.  Response to previous treatment: no adverse symptoms noted  Functional change: no significant changes noted    Pain: NT/10     Location: R hip    Objective      Objective Measures updated at progress report or POC update only unless otherwise noted.       Treatment     Bee received the treatments listed below:        CPT Intervention Performed   Today Duration / Intensity   MT Soft tissue mobilization  x R glutes and piriformis      Lumbar distraction  x 5 mins    TE Recumbent bike  x Level 3 for 5 mins      Hamstring stretch  x 2x1 min on R      Figure 4 lower trunk rotations  x 2 mins x 5s holds b      Piriformis stretch - figure 4 push and pull  x 2 mins x 10s holds each    NMR Abdominal bracing  x 3 mins x 5s holds      Posterior pelvic tilt  x 3 mins x 5s holds      Bridges + posterior pelvic tilt  x 3x10      Clamshells  x 3x10 b      Prone hip extensions  x 3x5 b                         TA                                       PLAN Sciatic nerve glides   Ball squeeze   Arm arc   Modified tabletop hold   Single leg stance pallof press   Standing hip 3 way              CPT Codes available for Billing:   (15) minutes of Manual therapy (MT) to improve pain and ROM.  (24) minutes of Therapeutic Exercise (TE) to develop strength, endurance, range of motion, and flexibility.  (15) minutes of Neuromuscular Re-Education (NMR)  to improve: Balance, Coordination, Kinesthetic, Sense, Proprioception, and Posture.  (00) minutes of Therapeutic Activities (TA) to improve functional performance.  Vasopneumatic Device Therapy () for management of swelling/edema. (38016)  Unattended Electrical Stimulation (ES) for muscle performance or pain modulation.  BFR: Blood flow restriction applied during exercise              Patient Education and Home Exercises       Home Exercises Provided and Patient Education Provided     Education provided: (time included with treatment) minutes  PURPOSE: Patient educated on the impairments noted above and the effects of physical therapy intervention to improve overall condition and QOL.   EXERCISE: Patient was educated on all the above exercise prior/during/after for proper posture, positioning, and execution for safe performance with home exercise program.   STRENGTH: Patient educated on the importance of improved core and extremity strength in order to improve alignment of the spine and extremities with static positions and dynamic movement.   POSTURE: Patient educated on postural awareness to reduce stress and maintain optimal alignment of the spine with static positions and dynamic movement     Written Home Exercises Provided: yes.  Exercises were reviewed and Bee was able to demonstrate them prior to the end of the session.  Bee demonstrated good  understanding of the education provided. See EMR under Patient Instructions for exercises provided  during therapy sessions.    Assessment     Patient tolerated session well today.  Incorporated abdominal bracing and posterior pelvic tilt in order to improve neuromuscular control of the lumbar region. Added bridges and clamshells to improve hip strength and neuromuscular control.     Bee is progressing well towards her goals.   Patient prognosis is Good.     Patient will continue to benefit from skilled outpatient physical therapy to address the deficits listed in the problem list box on initial evaluation, provide pt/family education and to maximize patient's level of independence in the home and community environment.     Patient's spiritual, cultural and educational needs considered and pt agreeable to plan of care and goals.     Anticipated Barriers for therapy: co-morbidities, chronicity of condition, and adherence to treatment plan    Short Term Goals:  6 weeks Status  Date Met   PAIN: Pt will report worst pain of 7/10 in order to progress toward max functional ability and improve quality of life. [x] Progressing  [] Met  [] Not Met     FUNCTION: Patient will demonstrate improved function as indicated by a score of greater than or equal to 39 out of 100 on FOTO. [x] Progressing  [] Met  [] Not Met     MOBILITY: Patient will improve AROM to 50% of stated goals, listed in objective measures above, in order to progress towards independence with functional activities.  [x] Progressing  [] Met  [] Not Met     STRENGTH: Patient will improve strength to 50% of stated goals, listed in objective measures above, in order to progress towards independence with functional activities. [x] Progressing  [] Met  [] Not Met     HEP: Patient will demonstrate independence with HEP in order to progress toward functional independence. [x] Progressing  [] Met  [] Not Met        Long Term Goals:  12 weeks Status Date Met   PAIN: Pt will report worst pain of 4/10 in order to progress toward max functional ability and improve  quality of life [x] Progressing  [] Met  [] Not Met     FUNCTION: Patient will demonstrate improved function as indicated by a score of greater than or equal to 56 out of 100 on FOTO. [x] Progressing  [] Met  [] Not Met     MOBILITY: Patient will improve AROM to stated goals, listed in objective measures above, in order to return to maximal functional potential and improve quality of life.  [x] Progressing  [] Met  [] Not Met     STRENGTH: Patient will improve strength to stated goals, listed in objective measures above, in order to improve functional independence and quality of life.  [x] Progressing  [] Met  [] Not Met     GAIT: Patient will demonstrate normalized gait mechanics with minimal compensation in order to return to PLOF. [x] Progressing  [] Met  [] Not Met     Patient will return to normal ADL's, IADL's, community involvement, recreational activities, and work-related activities with less than or equal to 4/10 pain and maximal function.  [x] Progressing  [] Met  [] Not Met           Plan     Continue Plan of Care (POC) and progress per patient tolerance. See treatment section for details on planned progressions next session.      Germania Galeano, PT

## 2025-01-25 ENCOUNTER — PATIENT MESSAGE (OUTPATIENT)
Dept: PULMONOLOGY | Facility: CLINIC | Age: 40
End: 2025-01-25
Payer: COMMERCIAL

## 2025-01-29 ENCOUNTER — CLINICAL SUPPORT (OUTPATIENT)
Dept: REHABILITATION | Facility: HOSPITAL | Age: 40
End: 2025-01-29
Payer: COMMERCIAL

## 2025-01-29 DIAGNOSIS — Z74.09 DECREASED FUNCTIONAL MOBILITY AND ENDURANCE: Primary | ICD-10-CM

## 2025-01-29 DIAGNOSIS — M62.81 PROXIMAL MUSCLE WEAKNESS: ICD-10-CM

## 2025-01-29 DIAGNOSIS — M53.86 DECREASED RANGE OF MOTION OF LUMBAR SPINE: ICD-10-CM

## 2025-01-29 DIAGNOSIS — R26.89 POOR BALANCE: ICD-10-CM

## 2025-01-29 PROCEDURE — 97112 NEUROMUSCULAR REEDUCATION: CPT

## 2025-01-29 PROCEDURE — 97140 MANUAL THERAPY 1/> REGIONS: CPT

## 2025-01-29 PROCEDURE — 97110 THERAPEUTIC EXERCISES: CPT

## 2025-01-30 NOTE — PROGRESS NOTES
[facility]   Physical Therapy Treatment Note        Name: Bee Condon  Clinic Number: [id number]    Therapy Diagnosis:   Encounter Diagnoses   Name Primary?    Decreased functional mobility and endurance Yes    Decreased range of motion of lumbar spine     Poor balance     Proximal muscle weakness      Physician: Mayda Rice MD    Visit Date: 1/29/2025    Physician Orders: PT Eval and Treat  Medical Diagnosis from Referral: Sciatica, unspecified laterality [M54.30]   Evaluation Date: 1/8/2025  Authorization Period Expiration: 1/8/2026   Plan of Care Expiration: 4/8/2025  Progress Note Due: 2/8/2025  Visit # / Visits authorized: 2/25 (+1 for evaluation)   FOTO: 1/3 (last performed on 1/8/2025)     Precautions: Standard    Time In: 1330  Time Out: 1429  Total Billable Time: 55 minutes (Billing reflects 1 on 1 treatment time spent with patient)    Subjective     Patient reports: She is feeling much better overall but notes that she was very sore following previous session     He/She was compliant with home exercise program.  Response to previous treatment: no adverse symptoms noted  Functional change: no significant changes noted    Pain: NT/10     Location: R hip    Objective      Objective Measures updated at progress report or POC update only unless otherwise noted.       Treatment     Bee received the treatments listed below:         CPT Intervention Performed   Today Duration / Intensity   MT Soft tissue mobilization  x R glutes and piriformis      Lumbar distraction    5 mins    TE Recumbent bike  x Level 3 for 5 mins      Hamstring stretch    2x1 min on R      Figure 4 lower trunk rotations  x 2 mins x 5s holds b      Piriformis stretch - figure 4 push and pull    2 mins x 10s holds each      Sciatic nerve glides  x 2 mins x 5s holds              NMR Abdominal bracing    3 mins x 5s holds      Posterior pelvic tilt  x 3 mins x 5s holds      Bridges + posterior pelvic  tilt  x 3x10      Clamshells  x 3x10 b      Prone hip extensions  x 3x5 b      Table top hold - feet on exercise ball  x 4x30s      Single leg stance  x 3x30s holds with upper extremity support as needed      Standing hip flexion x Red band 2x10 b      Standing hip abduction  x Red band 2x10 b    TA                                       PLAN Single leg stance pallof press   Standing hip 3 way   Side stepping   Leg press              CPT Codes available for Billing:   (08) minutes of Manual therapy (MT) to improve pain and ROM.  (12) minutes of Therapeutic Exercise (TE) to develop strength, endurance, range of motion, and flexibility.  (35) minutes of Neuromuscular Re-Education (NMR)  to improve: Balance, Coordination, Kinesthetic, Sense, Proprioception, and Posture.  (00) minutes of Therapeutic Activities (TA) to improve functional performance.  Vasopneumatic Device Therapy () for management of swelling/edema. (70261)  Unattended Electrical Stimulation (ES) for muscle performance or pain modulation.  BFR: Blood flow restriction applied during exercise              Patient Education and Home Exercises       Home Exercises Provided and Patient Education Provided     Education provided: (time included with treatment) minutes  PURPOSE: Patient educated on the impairments noted above and the effects of physical therapy intervention to improve overall condition and QOL.   EXERCISE: Patient was educated on all the above exercise prior/during/after for proper posture, positioning, and execution for safe performance with home exercise program.   STRENGTH: Patient educated on the importance of improved core and extremity strength in order to improve alignment of the spine and extremities with static positions and dynamic movement.   POSTURE: Patient educated on postural awareness to reduce stress and maintain optimal alignment of the spine with static positions and dynamic movement     Written Home Exercises Provided:  yes.  Exercises were reviewed and Bee was able to demonstrate them prior to the end of the session.  Bee demonstrated good  understanding of the education provided. See EMR under Patient Instructions for exercises provided during therapy sessions.    Assessment     Patient tolerated session well today.  Added tabletop holds to progress core strength and stability. Added standing hip abduction to improve lateral hip strength; significant increased fatigue noted with this intervention    Bee is progressing well towards her goals.   Patient prognosis is Good.     Patient will continue to benefit from skilled outpatient physical therapy to address the deficits listed in the problem list box on initial evaluation, provide pt/family education and to maximize patient's level of independence in the home and community environment.     Patient's spiritual, cultural and educational needs considered and pt agreeable to plan of care and goals.     Anticipated Barriers for therapy: co-morbidities, chronicity of condition, and adherence to treatment plan    Short Term Goals:  6 weeks Status  Date Met   PAIN: Pt will report worst pain of 7/10 in order to progress toward max functional ability and improve quality of life. [x] Progressing  [] Met  [] Not Met     FUNCTION: Patient will demonstrate improved function as indicated by a score of greater than or equal to 39 out of 100 on FOTO. [x] Progressing  [] Met  [] Not Met     MOBILITY: Patient will improve AROM to 50% of stated goals, listed in objective measures above, in order to progress towards independence with functional activities.  [x] Progressing  [] Met  [] Not Met     STRENGTH: Patient will improve strength to 50% of stated goals, listed in objective measures above, in order to progress towards independence with functional activities. [x] Progressing  [] Met  [] Not Met     HEP: Patient will demonstrate independence with HEP in order to progress toward  functional independence. [x] Progressing  [] Met  [] Not Met        Long Term Goals:  12 weeks Status Date Met   PAIN: Pt will report worst pain of 4/10 in order to progress toward max functional ability and improve quality of life [x] Progressing  [] Met  [] Not Met     FUNCTION: Patient will demonstrate improved function as indicated by a score of greater than or equal to 56 out of 100 on FOTO. [x] Progressing  [] Met  [] Not Met     MOBILITY: Patient will improve AROM to stated goals, listed in objective measures above, in order to return to maximal functional potential and improve quality of life.  [x] Progressing  [] Met  [] Not Met     STRENGTH: Patient will improve strength to stated goals, listed in objective measures above, in order to improve functional independence and quality of life.  [x] Progressing  [] Met  [] Not Met     GAIT: Patient will demonstrate normalized gait mechanics with minimal compensation in order to return to PLOF. [x] Progressing  [] Met  [] Not Met     Patient will return to normal ADL's, IADL's, community involvement, recreational activities, and work-related activities with less than or equal to 4/10 pain and maximal function.  [x] Progressing  [] Met  [] Not Met           Plan     Continue Plan of Care (POC) and progress per patient tolerance. See treatment section for details on planned progressions next session.      Germania Galeano, PT

## 2025-02-05 ENCOUNTER — CLINICAL SUPPORT (OUTPATIENT)
Dept: REHABILITATION | Facility: HOSPITAL | Age: 40
End: 2025-02-05
Payer: COMMERCIAL

## 2025-02-05 DIAGNOSIS — M53.86 DECREASED RANGE OF MOTION OF LUMBAR SPINE: ICD-10-CM

## 2025-02-05 DIAGNOSIS — M62.81 PROXIMAL MUSCLE WEAKNESS: ICD-10-CM

## 2025-02-05 DIAGNOSIS — Z74.09 DECREASED FUNCTIONAL MOBILITY AND ENDURANCE: Primary | ICD-10-CM

## 2025-02-05 DIAGNOSIS — R26.89 POOR BALANCE: ICD-10-CM

## 2025-02-05 PROCEDURE — 97110 THERAPEUTIC EXERCISES: CPT

## 2025-02-05 PROCEDURE — 97140 MANUAL THERAPY 1/> REGIONS: CPT

## 2025-02-05 PROCEDURE — 97112 NEUROMUSCULAR REEDUCATION: CPT

## 2025-02-05 NOTE — PROGRESS NOTES
[facility]   Physical Therapy Treatment Note        Name: Bee Condon  Clinic Number: [id number]    Therapy Diagnosis:   Encounter Diagnoses   Name Primary?    Decreased functional mobility and endurance Yes    Decreased range of motion of lumbar spine     Poor balance     Proximal muscle weakness      Physician: Mayda Rice MD    Visit Date: 2/5/2025    Physician Orders: PT Eval and Treat  Medical Diagnosis from Referral: Sciatica, unspecified laterality [M54.30]   Evaluation Date: 1/8/2025  Authorization Period Expiration: 1/8/2026   Plan of Care Expiration: 4/8/2025  Progress Note Due: 2/8/2025  Visit # / Visits authorized: 3/25 (+1 for evaluation)   FOTO: 1/3 (last performed on 1/8/2025)     Precautions: Standard    Time In: 1030  Time Out: 1132  Total Billable Time: 53 minutes (Billing reflects 1 on 1 treatment time spent with patient)    Subjective     Patient reports: She has been feeling much better overall. Notes that she continues to have numbness and tingling but that it takes much longer to come on, symptoms are less intense and she no longer gets symptoms during her sleep.    He/She was compliant with home exercise program.  Response to previous treatment: no adverse symptoms noted  Functional change: no significant changes noted    Pain: NT/10     Location: R hip    Objective      Objective Measures updated at progress report or POC update only unless otherwise noted.       Treatment     Bee received the treatments listed below:          CPT Intervention Performed   Today Duration / Intensity   MT Soft tissue mobilization  x R glutes and piriformis      Lumbar distraction    5 mins    TE Recumbent bike  x Level 3 for 5 mins      Hamstring stretch - supine 90/90 x 90s x 5s holds b      Figure 4 lower trunk rotations  x 2 mins x 5s holds b      Piriformis stretch - figure 4 push and pull    2 mins x 10s holds each      Sciatic nerve glides - slump    2 mins x  5s holds              NMR Abdominal bracing    3 mins x 5s holds      Posterior pelvic tilt  x 3 mins x 5s holds      Bridges + posterior pelvic tilt  x 3x10      Clamshells  x 2 mins x 5s holds b      Prone hip extensions  x 3x5 b      Seated hip IR  x Red band 2x10     Table top hold - feet on exercise ball    4x30s      Single leg stance  x 3x30s holds with upper extremity support as needed      Standing hip flexion   Red band 2x10 b      Standing hip abduction    Red band 2x10 b    TA Leg press  x Double le# 3x10                                  PLAN Single leg stance pallof press   Standing hip 3 way   Side stepping   Leg press              CPT Codes available for Billing:   (08) minutes of Manual therapy (MT) to improve pain and ROM.  (12) minutes of Therapeutic Exercise (TE) to develop strength, endurance, range of motion, and flexibility.  (28) minutes of Neuromuscular Re-Education (NMR)  to improve: Balance, Coordination, Kinesthetic, Sense, Proprioception, and Posture.  (05) minutes of Therapeutic Activities (TA) to improve functional performance.  Vasopneumatic Device Therapy () for management of swelling/edema. (44952)  Unattended Electrical Stimulation (ES) for muscle performance or pain modulation.  BFR: Blood flow restriction applied during exercise              Patient Education and Home Exercises       Home Exercises Provided and Patient Education Provided     Education provided: (time included with treatment) minutes  PURPOSE: Patient educated on the impairments noted above and the effects of physical therapy intervention to improve overall condition and QOL.   EXERCISE: Patient was educated on all the above exercise prior/during/after for proper posture, positioning, and execution for safe performance with home exercise program.   STRENGTH: Patient educated on the importance of improved core and extremity strength in order to improve alignment of the spine and extremities with static  positions and dynamic movement.   POSTURE: Patient educated on postural awareness to reduce stress and maintain optimal alignment of the spine with static positions and dynamic movement     Written Home Exercises Provided: yes.  Exercises were reviewed and Bee was able to demonstrate them prior to the end of the session.  Bee demonstrated good  understanding of the education provided. See EMR under Patient Instructions for exercises provided during therapy sessions.    Assessment     Patient tolerated session well today.  Increased hold time with bridges and clamshells to progress endurance of the hip musculature. Added leg press to improve functional strength of the lower extremities. FOTO scores indicate perceived function has improved since starting physical therapy     Bee is progressing well towards her goals.   Patient prognosis is Good.     Patient will continue to benefit from skilled outpatient physical therapy to address the deficits listed in the problem list box on initial evaluation, provide pt/family education and to maximize patient's level of independence in the home and community environment.     Patient's spiritual, cultural and educational needs considered and pt agreeable to plan of care and goals.     Anticipated Barriers for therapy: co-morbidities, chronicity of condition, and adherence to treatment plan    Short Term Goals:  6 weeks Status  Date Met   PAIN: Pt will report worst pain of 7/10 in order to progress toward max functional ability and improve quality of life. [x] Progressing  [] Met  [] Not Met     FUNCTION: Patient will demonstrate improved function as indicated by a score of greater than or equal to 39 out of 100 on FOTO. [x] Progressing  [] Met  [] Not Met     MOBILITY: Patient will improve AROM to 50% of stated goals, listed in objective measures above, in order to progress towards independence with functional activities.  [x] Progressing  [] Met  [] Not Met      STRENGTH: Patient will improve strength to 50% of stated goals, listed in objective measures above, in order to progress towards independence with functional activities. [x] Progressing  [] Met  [] Not Met     HEP: Patient will demonstrate independence with HEP in order to progress toward functional independence. [x] Progressing  [] Met  [] Not Met        Long Term Goals:  12 weeks Status Date Met   PAIN: Pt will report worst pain of 4/10 in order to progress toward max functional ability and improve quality of life [x] Progressing  [] Met  [] Not Met     FUNCTION: Patient will demonstrate improved function as indicated by a score of greater than or equal to 56 out of 100 on FOTO. [x] Progressing  [] Met  [] Not Met     MOBILITY: Patient will improve AROM to stated goals, listed in objective measures above, in order to return to maximal functional potential and improve quality of life.  [x] Progressing  [] Met  [] Not Met     STRENGTH: Patient will improve strength to stated goals, listed in objective measures above, in order to improve functional independence and quality of life.  [x] Progressing  [] Met  [] Not Met     GAIT: Patient will demonstrate normalized gait mechanics with minimal compensation in order to return to PLOF. [x] Progressing  [] Met  [] Not Met     Patient will return to normal ADL's, IADL's, community involvement, recreational activities, and work-related activities with less than or equal to 4/10 pain and maximal function.  [x] Progressing  [] Met  [] Not Met           Plan     Continue Plan of Care (POC) and progress per patient tolerance. See treatment section for details on planned progressions next session.      Germania Galeano, PT

## 2025-02-06 ENCOUNTER — PATIENT MESSAGE (OUTPATIENT)
Dept: PSYCHIATRY | Facility: CLINIC | Age: 40
End: 2025-02-06
Payer: COMMERCIAL

## 2025-02-10 ENCOUNTER — CLINICAL SUPPORT (OUTPATIENT)
Dept: REHABILITATION | Facility: HOSPITAL | Age: 40
End: 2025-02-10
Payer: COMMERCIAL

## 2025-02-10 DIAGNOSIS — M53.86 DECREASED RANGE OF MOTION OF LUMBAR SPINE: ICD-10-CM

## 2025-02-10 DIAGNOSIS — R26.89 POOR BALANCE: ICD-10-CM

## 2025-02-10 DIAGNOSIS — M62.81 PROXIMAL MUSCLE WEAKNESS: ICD-10-CM

## 2025-02-10 DIAGNOSIS — Z74.09 DECREASED FUNCTIONAL MOBILITY AND ENDURANCE: Primary | ICD-10-CM

## 2025-02-10 PROCEDURE — 97112 NEUROMUSCULAR REEDUCATION: CPT

## 2025-02-10 PROCEDURE — 97110 THERAPEUTIC EXERCISES: CPT

## 2025-02-10 PROCEDURE — 97530 THERAPEUTIC ACTIVITIES: CPT

## 2025-02-10 NOTE — PROGRESS NOTES
[facility]   Physical Therapy Treatment Note        Name: Bee Condon  Clinic Number: [id number]    Therapy Diagnosis:   Encounter Diagnoses   Name Primary?    Decreased functional mobility and endurance Yes    Decreased range of motion of lumbar spine     Poor balance     Proximal muscle weakness      Physician: Mayda iRce MD    Visit Date: 2/10/2025    Physician Orders: PT Eval and Treat  Medical Diagnosis from Referral: Sciatica, unspecified laterality [M54.30]   Evaluation Date: 1/8/2025  Authorization Period Expiration: 1/8/2026   Plan of Care Expiration: 4/8/2025  Progress Note Due: 2/8/2025  Visit # / Visits authorized: 4/25 (+1 for evaluation)   FOTO: 1/3 (last performed on 1/8/2025)     Precautions: Standard    Time In: 1120  Time Out: 1225  Total Billable Time: 54 minutes (Billing reflects 1 on 1 treatment time spent with patient)    Subjective     Patient reports: She has been feeling much better overall. States she has not had any nerve symptoms since last session but that her hips have just been feeling sore.     He/She was compliant with home exercise program.  Response to previous treatment: no adverse symptoms noted  Functional change: no significant changes noted    Pain: NT/10     Location: R hip    Objective      Objective Measures updated at progress report or POC update only unless otherwise noted.       Treatment     Bee received the treatments listed below:           CPT Intervention Performed   Today Duration / Intensity   MT Soft tissue mobilization  x R glutes and piriformis      Lumbar distraction    5 mins    TE Recumbent bike  x Level 3 for 5 mins      Hamstring stretch - supine 90/90 x 90s x 5s holds b      Figure 4 lower trunk rotations  x 2 mins x 5s holds b      Piriformis stretch - figure 4 push and pull    2 mins x 10s holds each      Sciatic nerve glides - slump    2 mins x 5s holds              NMR Abdominal bracing    3 mins x 5s  holds      Posterior pelvic tilt  x 3 mins x 5s holds      Bridges + posterior pelvic tilt  x 3 mins x 5s holds      Clamshells  x 2 mins x 5s holds b      Prone hip extensions  x 3x5 b      Seated hip IR  x Red band 2x10     Table top hold - feet on exercise ball    4x30s      Single leg stance kettle pass  x 5# 3x15 v      Standing hip flexion   Red band 2x10 b      Standing hip abduction    Red band 2x10 b    TA Leg press  x Double le# 3x10      Side stepping along mat  x Red band at ankles, 5 laps along mat      Sit to stand  x 20 inch, 3x10             PLAN Single leg stance pallof press   Standing hip 3 way              CPT Codes available for Billing:   (05) minutes of Manual therapy (MT) to improve pain and ROM.  (12) minutes of Therapeutic Exercise (TE) to develop strength, endurance, range of motion, and flexibility.  (25) minutes of Neuromuscular Re-Education (NMR)  to improve: Balance, Coordination, Kinesthetic, Sense, Proprioception, and Posture.  (12) minutes of Therapeutic Activities (TA) to improve functional performance.  Vasopneumatic Device Therapy () for management of swelling/edema. (60699)  Unattended Electrical Stimulation (ES) for muscle performance or pain modulation.  BFR: Blood flow restriction applied during exercise                Patient Education and Home Exercises       Home Exercises Provided and Patient Education Provided     Education provided: (time included with treatment) minutes  PURPOSE: Patient educated on the impairments noted above and the effects of physical therapy intervention to improve overall condition and QOL.   EXERCISE: Patient was educated on all the above exercise prior/during/after for proper posture, positioning, and execution for safe performance with home exercise program.   STRENGTH: Patient educated on the importance of improved core and extremity strength in order to improve alignment of the spine and extremities with static positions and dynamic  movement.   POSTURE: Patient educated on postural awareness to reduce stress and maintain optimal alignment of the spine with static positions and dynamic movement     Written Home Exercises Provided: yes.  Exercises were reviewed and Bee was able to demonstrate them prior to the end of the session.  Bee demonstrated good  understanding of the education provided. See EMR under Patient Instructions for exercises provided during therapy sessions.    Assessment     Patient tolerated session well today.  Increased hold time with bridges to improve muscular endurance with hip extension. Added sit to stands to improve functional lower extremity strength and endurance.     Bee is progressing well towards her goals.   Patient prognosis is Good.     Patient will continue to benefit from skilled outpatient physical therapy to address the deficits listed in the problem list box on initial evaluation, provide pt/family education and to maximize patient's level of independence in the home and community environment.     Patient's spiritual, cultural and educational needs considered and pt agreeable to plan of care and goals.     Anticipated Barriers for therapy: co-morbidities, chronicity of condition, and adherence to treatment plan    Short Term Goals:  6 weeks Status  Date Met   PAIN: Pt will report worst pain of 7/10 in order to progress toward max functional ability and improve quality of life. [x] Progressing  [] Met  [] Not Met     FUNCTION: Patient will demonstrate improved function as indicated by a score of greater than or equal to 39 out of 100 on FOTO. [x] Progressing  [] Met  [] Not Met     MOBILITY: Patient will improve AROM to 50% of stated goals, listed in objective measures above, in order to progress towards independence with functional activities.  [x] Progressing  [] Met  [] Not Met     STRENGTH: Patient will improve strength to 50% of stated goals, listed in objective measures above, in order to  progress towards independence with functional activities. [x] Progressing  [] Met  [] Not Met     HEP: Patient will demonstrate independence with HEP in order to progress toward functional independence. [x] Progressing  [] Met  [] Not Met        Long Term Goals:  12 weeks Status Date Met   PAIN: Pt will report worst pain of 4/10 in order to progress toward max functional ability and improve quality of life [x] Progressing  [] Met  [] Not Met     FUNCTION: Patient will demonstrate improved function as indicated by a score of greater than or equal to 56 out of 100 on FOTO. [x] Progressing  [] Met  [] Not Met     MOBILITY: Patient will improve AROM to stated goals, listed in objective measures above, in order to return to maximal functional potential and improve quality of life.  [x] Progressing  [] Met  [] Not Met     STRENGTH: Patient will improve strength to stated goals, listed in objective measures above, in order to improve functional independence and quality of life.  [x] Progressing  [] Met  [] Not Met     GAIT: Patient will demonstrate normalized gait mechanics with minimal compensation in order to return to PLOF. [x] Progressing  [] Met  [] Not Met     Patient will return to normal ADL's, IADL's, community involvement, recreational activities, and work-related activities with less than or equal to 4/10 pain and maximal function.  [x] Progressing  [] Met  [] Not Met           Plan     Continue Plan of Care (POC) and progress per patient tolerance. See treatment section for details on planned progressions next session.      Germania Galeano, PT

## 2025-02-14 ENCOUNTER — CLINICAL SUPPORT (OUTPATIENT)
Dept: REHABILITATION | Facility: HOSPITAL | Age: 40
End: 2025-02-14
Payer: COMMERCIAL

## 2025-02-14 DIAGNOSIS — R26.89 POOR BALANCE: ICD-10-CM

## 2025-02-14 DIAGNOSIS — M53.86 DECREASED RANGE OF MOTION OF LUMBAR SPINE: ICD-10-CM

## 2025-02-14 DIAGNOSIS — M62.81 PROXIMAL MUSCLE WEAKNESS: ICD-10-CM

## 2025-02-14 DIAGNOSIS — Z74.09 DECREASED FUNCTIONAL MOBILITY AND ENDURANCE: Primary | ICD-10-CM

## 2025-02-14 PROCEDURE — 97112 NEUROMUSCULAR REEDUCATION: CPT

## 2025-02-14 PROCEDURE — 97530 THERAPEUTIC ACTIVITIES: CPT

## 2025-02-14 PROCEDURE — 97110 THERAPEUTIC EXERCISES: CPT

## 2025-02-14 NOTE — PROGRESS NOTES
[facility]   Physical Therapy Treatment Note / Progress Note       Name: Bee Condon  Clinic Number: [id number]    Therapy Diagnosis:   Encounter Diagnoses   Name Primary?    Decreased functional mobility and endurance Yes    Decreased range of motion of lumbar spine     Poor balance     Proximal muscle weakness      Physician: Mayda Rice MD    Visit Date: 2/14/2025    Physician Orders: PT Eval and Treat  Medical Diagnosis from Referral: Sciatica, unspecified laterality [M54.30]   Evaluation Date: 1/8/2025  Authorization Period Expiration: 1/8/2026   Plan of Care Expiration: 4/8/2025  Progress Note Due: 3/14/2025  Visit # / Visits authorized: 5/25 (+1 for evaluation)   FOTO: 1/3 (last performed on 1/8/2025)     Precautions: Standard    Time In: 1230  Time Out: 1328  Total Billable Time: 54 minutes (Billing reflects 1 on 1 treatment time spent with patient)    Subjective     Patient reports: She has been feeling great with minimal overall symptoms. She is no longer getting numbness in her leg at night. She is able to put on her shoes with no pain. Has not tried driving much since previous session     He/She was compliant with home exercise program.  Response to previous treatment: no adverse symptoms noted  Functional change: no significant changes noted    Pain: NT/10     Location: R hip    Objective      Objective Measures updated at progress report or POC update only unless otherwise noted.     RANGE OF MOTION:   Lumbar ROM Right  (spine) Left    Pain/Dysfunction with Movement Goal   Lumbar Flexion (60º) 100% ---  mild R hip/SIJ discomfort      Lumbar Extension (30º) 100% --- Mild pain in the R hip/SIJ     Lumbar Side Bending (25º) 100% 100% Mild pain in R hip/SIJ 75%   Lumbar Rotation 75% 75% Mild pain in R hip/SIJ        Hip AROM/PROM Right Left Pain/Dysfunction with Movement Goal   Hip Flexion (120º) 110 110 Body habitus      Hip Extension (30º) 5 5       Hip Abduction  (45º) 45 45       Hip IR (45º) 35 35       Hip ER (45º) 45 45                STRENGTH: (* denotes pain)  L/E MMT Right  (spine) Left Dysfunction with Movement Goal   Modified (90/90) Abdominal Strength    ---       Hip Flexion  4+/5 4+/5   4+/5 B   Hip Extension  4-/5 4-/5    4+/5 B   Hip Abduction   4/5 4+/5  Pain on R   4+/5 B   Knee Extension 5/5 5/5   5/5 B   Knee Flexion 5/5 5/5   5/5 B   Hip IR 4-/5 4+/5 Pain on R   4+/5 B   Hip ER 4+/5 4+/5   4+/5 B   Ankle DF       5/5 B   Ankle PF       5/5 B   Ankle Inversion       5/5 B   Ankle Eversion       5/5 B                           SPECIAL TESTS:     Right  (spine) Left  Goal   Lumbar Distraction      Negative B    Lumbar Compression Negative --- Negative B    SLUMP Negative Negative Negative B    Straight Leg Raise     Negative B    ASIS Compression     Negative    ASIS Distraction      Negative    Posterior Shear     Negative B    YOUNG     Negative B             SENSATION: Intact to Light Touch         PALPATION: Increased tenderness to palpation of: right , glutes, piriformis.     POSTURE: Pt presents with postural abnormalities which include: forward head, rounded shoulders , and increased thoracic kyphosis         GAIT ANALYSIS The patient ambulated with the following assistive device: none; the pt presents with the following gait abnormalities: trendelenburg     FUNCTIONAL MOVEMENT PATTERNS  Movement Analysis Notes   Bed Mobility  DYSFUNCTIONAL, PAINFUL      Transfers       Sit to Stand FUNCTIONAL, PAINFUL      Squat FUNCTIONAL, PAINFUL      Single Leg Squat        Step Down        Single leg calf raises  FUNCTIONAL, NONPAINFUL R: 10/10  L: 10/10         BALANCE:    Right   (seconds) Left  (seconds) Pain/dysfunction Noted Goal   Narrow Base of Support   ---   60+ seconds   Tandem Stance       60+ seconds   Single Leg Stance 30+ 30+ Fatigue in the leg  60+ seconds           Treatment     Bee received the treatments listed below:      CPT Intervention  Performed   Today Duration / Intensity   MT Soft tissue mobilization  x R glutes and piriformis      Lumbar distraction    5 mins    TE Recumbent bike  x Level 3 for 5 mins      Hamstring stretch - supine 90/90 x 90s x 5s holds b      Figure 4 lower trunk rotations  x 2 mins x 5s holds b      Piriformis stretch - figure 4 push and pull    2 mins x 10s holds each      Sciatic nerve glides - slump    2 mins x 5s holds              NMR Abdominal bracing    3 mins x 5s holds      Posterior pelvic tilt  x 3 mins x 5s holds      Bridges + posterior pelvic tilt  x 30# 3x10      Clamshells  x Red band 3x10 b      Straight leg raise abduction  x 2x5b      Prone hip extensions  x 3x5 b      Seated hip IR  x Red band 3x10     Table top hold - feet on exercise ball    4x30s      Single leg stance kettle pass    5# 3x15 v      Standing hip flexion   Red band 2x10 b      Standing hip abduction    Red band 2x10 b    TA Leg press  x Double le# 3x10      Side stepping along mat  x Red band at ankles, 4 laps      Sit to stand  x 20 inch, 3x10             PLAN Single leg stance pallof press   Standing hip 3 way              CPT Codes available for Billing:   (05) minutes of Manual therapy (MT) to improve pain and ROM.  (12) minutes of Therapeutic Exercise (TE) to develop strength, endurance, range of motion, and flexibility.  (25) minutes of Neuromuscular Re-Education (NMR)  to improve: Balance, Coordination, Kinesthetic, Sense, Proprioception, and Posture.  (12) minutes of Therapeutic Activities (TA) to improve functional performance.  Vasopneumatic Device Therapy () for management of swelling/edema. (21047)  Unattended Electrical Stimulation (ES) for muscle performance or pain modulation.  BFR: Blood flow restriction applied during exercise              Patient Education and Home Exercises       Home Exercises Provided and Patient Education Provided     Education provided: (time included with treatment) minutes  PURPOSE:  Patient educated on the impairments noted above and the effects of physical therapy intervention to improve overall condition and QOL.   EXERCISE: Patient was educated on all the above exercise prior/during/after for proper posture, positioning, and execution for safe performance with home exercise program.   STRENGTH: Patient educated on the importance of improved core and extremity strength in order to improve alignment of the spine and extremities with static positions and dynamic movement.   POSTURE: Patient educated on postural awareness to reduce stress and maintain optimal alignment of the spine with static positions and dynamic movement     Written Home Exercises Provided: yes.  Exercises were reviewed and Bee was able to demonstrate them prior to the end of the session.  Bee demonstrated good  understanding of the education provided. See EMR under Patient Instructions for exercises provided during therapy sessions.    Assessment     Patient tolerated session well today.  Added straight leg raise abduction and increased resistance with bridges and clamshells to progress hip strength and endurance. An assessment was completed today with improvements noted in lumbar ROM and gross lower extremity strength compared to prior assessment; please see exam for details. He/she continues to have difficulty with mild discomfort in the right hip and sacroiliac joint due to remaining deficits in strength. The above impairments and functional deficits will continue to be addressed over the course of this plan of care. patient was educated on continued progression of PT, expectations for the duration of care, updates on goals set at initial evaluation, and home exercise program.  Patient will continue to benefit from physical therapy in order to further address goals, maximize function and quality of life.     Bee is progressing well towards her goals.   Patient prognosis is Good.     Patient will continue to  benefit from skilled outpatient physical therapy to address the deficits listed in the problem list box on initial evaluation, provide pt/family education and to maximize patient's level of independence in the home and community environment.     Patient's spiritual, cultural and educational needs considered and pt agreeable to plan of care and goals.     Anticipated Barriers for therapy: co-morbidities, chronicity of condition, and adherence to treatment plan    Short Term Goals:  6 weeks Status  Date Met   PAIN: Pt will report worst pain of 7/10 in order to progress toward max functional ability and improve quality of life. [x] Progressing  [] Met  [] Not Met     FUNCTION: Patient will demonstrate improved function as indicated by a score of greater than or equal to 39 out of 100 on FOTO. [x] Progressing  [] Met  [] Not Met     MOBILITY: Patient will improve AROM to 50% of stated goals, listed in objective measures above, in order to progress towards independence with functional activities.  [x] Progressing  [] Met  [] Not Met     STRENGTH: Patient will improve strength to 50% of stated goals, listed in objective measures above, in order to progress towards independence with functional activities. [x] Progressing  [] Met  [] Not Met     HEP: Patient will demonstrate independence with HEP in order to progress toward functional independence. [x] Progressing  [] Met  [] Not Met        Long Term Goals:  12 weeks Status Date Met   PAIN: Pt will report worst pain of 4/10 in order to progress toward max functional ability and improve quality of life [x] Progressing  [] Met  [] Not Met     FUNCTION: Patient will demonstrate improved function as indicated by a score of greater than or equal to 56 out of 100 on FOTO. [x] Progressing  [] Met  [] Not Met     MOBILITY: Patient will improve AROM to stated goals, listed in objective measures above, in order to return to maximal functional potential and improve quality of life.   [x] Progressing  [] Met  [] Not Met     STRENGTH: Patient will improve strength to stated goals, listed in objective measures above, in order to improve functional independence and quality of life.  [x] Progressing  [] Met  [] Not Met     GAIT: Patient will demonstrate normalized gait mechanics with minimal compensation in order to return to PLOF. [x] Progressing  [] Met  [] Not Met     Patient will return to normal ADL's, IADL's, community involvement, recreational activities, and work-related activities with less than or equal to 4/10 pain and maximal function.  [x] Progressing  [] Met  [] Not Met           Plan     Continue Plan of Care (POC) and progress per patient tolerance. See treatment section for details on planned progressions next session.      Germania Galeano, PT

## 2025-02-17 ENCOUNTER — PATIENT MESSAGE (OUTPATIENT)
Dept: PULMONOLOGY | Facility: CLINIC | Age: 40
End: 2025-02-17
Payer: COMMERCIAL

## 2025-02-17 ENCOUNTER — CLINICAL SUPPORT (OUTPATIENT)
Dept: REHABILITATION | Facility: HOSPITAL | Age: 40
End: 2025-02-17
Payer: COMMERCIAL

## 2025-02-17 DIAGNOSIS — M53.86 DECREASED RANGE OF MOTION OF LUMBAR SPINE: ICD-10-CM

## 2025-02-17 DIAGNOSIS — M62.81 PROXIMAL MUSCLE WEAKNESS: ICD-10-CM

## 2025-02-17 DIAGNOSIS — Z74.09 DECREASED FUNCTIONAL MOBILITY AND ENDURANCE: Primary | ICD-10-CM

## 2025-02-17 DIAGNOSIS — R26.89 POOR BALANCE: ICD-10-CM

## 2025-02-17 PROCEDURE — 97530 THERAPEUTIC ACTIVITIES: CPT

## 2025-02-17 PROCEDURE — 97110 THERAPEUTIC EXERCISES: CPT

## 2025-02-17 PROCEDURE — 97112 NEUROMUSCULAR REEDUCATION: CPT

## 2025-02-17 PROCEDURE — 97140 MANUAL THERAPY 1/> REGIONS: CPT

## 2025-02-17 NOTE — PROGRESS NOTES
[facility]   Physical Therapy Treatment Note       Name: Bee Condon  Clinic Number: [id number]    Therapy Diagnosis:   Encounter Diagnoses   Name Primary?    Decreased functional mobility and endurance Yes    Decreased range of motion of lumbar spine     Poor balance     Proximal muscle weakness      Physician: Mayda Rice MD    Visit Date: 2/17/2025    Physician Orders: PT Eval and Treat  Medical Diagnosis from Referral: Sciatica, unspecified laterality [M54.30]   Evaluation Date: 1/8/2025  Authorization Period Expiration: 1/8/2026   Plan of Care Expiration: 4/8/2025  Progress Note Due: 3/14/2025  Visit # / Visits authorized: 6/25 (+1 for evaluation)   FOTO: 1/3 (last performed on 1/8/2025)     Precautions: Standard    Time In: 1130  Time Out: 1331  Total Billable Time: 55 minutes (Billing reflects 1 on 1 treatment time spent with patient)    Subjective     Patient reports: She was having increased pain in the low back over the weekend but is not sure what caused the onset. Pain continues today    He/She was compliant with home exercise program.  Response to previous treatment: no adverse symptoms noted  Functional change: no significant changes noted    Pain: NT/10     Location: R hip    Objective      Objective Measures updated at progress report or POC update only unless otherwise noted.         Treatment     Bee received the treatments listed below:         CPT Intervention Performed   Today Duration / Intensity   MT Soft tissue mobilization  x R glutes and piriformis      Joint mobilizations x Lumbar distraction, b hip distraction    TE Recumbent bike  x Level 3 for 5 mins      Hamstring stretch - supine 90/90   90s x 5s holds b      Figure 4 lower trunk rotations  x 2 mins x 5s holds b      Piriformis stretch - figure 4 push and pull    2 mins x 10s holds each              NMR Abdominal bracing    3 mins x 5s holds      Posterior pelvic tilt  x 3 mins x 5s holds       Bridges + posterior pelvic tilt  x 3 mins x 5s holds      Nerve glides  x 2 mins x 5s holds b      Clamshells  x 3x10 b      Straight leg raise abduction    2x5b      Prone hip extensions    3x5 b      Seated hip IR  x Green band 3x10     Table top hold - feet on exercise ball    4x30s      Single leg stance kettle pass    5# 3x15 v      Standing hip flexion   Red band 2x10 b      Standing hip abduction    Red band 2x10 b    TA Leg press  x Double le# 3x10      Side stepping along mat  x Green band at ankles, 4 laps      Sit to stand  x 20 inch, 3x10             PLAN Single leg stance pallof press   Standing hip 3 way              CPT Codes available for Billing:   (10) minutes of Manual therapy (MT) to improve pain and ROM.  (09) minutes of Therapeutic Exercise (TE) to develop strength, endurance, range of motion, and flexibility.  (24) minutes of Neuromuscular Re-Education (NMR)  to improve: Balance, Coordination, Kinesthetic, Sense, Proprioception, and Posture.  (12) minutes of Therapeutic Activities (TA) to improve functional performance.  Vasopneumatic Device Therapy () for management of swelling/edema. (31063)  Unattended Electrical Stimulation (ES) for muscle performance or pain modulation.  BFR: Blood flow restriction applied during exercise              Patient Education and Home Exercises       Home Exercises Provided and Patient Education Provided     Education provided: (time included with treatment) minutes  PURPOSE: Patient educated on the impairments noted above and the effects of physical therapy intervention to improve overall condition and QOL.   EXERCISE: Patient was educated on all the above exercise prior/during/after for proper posture, positioning, and execution for safe performance with home exercise program.   STRENGTH: Patient educated on the importance of improved core and extremity strength in order to improve alignment of the spine and extremities with static positions and  dynamic movement.   POSTURE: Patient educated on postural awareness to reduce stress and maintain optimal alignment of the spine with static positions and dynamic movement     Written Home Exercises Provided: yes.  Exercises were reviewed and Bee was able to demonstrate them prior to the end of the session.  Bee demonstrated good  understanding of the education provided. See EMR under Patient Instructions for exercises provided during therapy sessions.    Assessment     Patient tolerated session well today.  Pt notes significant reduction in pain following b hip distraction and soft tissue mobilization. Progressed resistance with seated hip IR and side stepping to improve hip strength and endurance. Pt noted reduced pain to 2/10 following session     Bee is progressing well towards her goals.   Patient prognosis is Good.     Patient will continue to benefit from skilled outpatient physical therapy to address the deficits listed in the problem list box on initial evaluation, provide pt/family education and to maximize patient's level of independence in the home and community environment.     Patient's spiritual, cultural and educational needs considered and pt agreeable to plan of care and goals.     Anticipated Barriers for therapy: co-morbidities, chronicity of condition, and adherence to treatment plan    Short Term Goals:  6 weeks Status  Date Met   PAIN: Pt will report worst pain of 7/10 in order to progress toward max functional ability and improve quality of life. [x] Progressing  [] Met  [] Not Met     FUNCTION: Patient will demonstrate improved function as indicated by a score of greater than or equal to 39 out of 100 on FOTO. [x] Progressing  [] Met  [] Not Met     MOBILITY: Patient will improve AROM to 50% of stated goals, listed in objective measures above, in order to progress towards independence with functional activities.  [x] Progressing  [] Met  [] Not Met     STRENGTH: Patient will  improve strength to 50% of stated goals, listed in objective measures above, in order to progress towards independence with functional activities. [x] Progressing  [] Met  [] Not Met     HEP: Patient will demonstrate independence with HEP in order to progress toward functional independence. [x] Progressing  [] Met  [] Not Met        Long Term Goals:  12 weeks Status Date Met   PAIN: Pt will report worst pain of 4/10 in order to progress toward max functional ability and improve quality of life [x] Progressing  [] Met  [] Not Met     FUNCTION: Patient will demonstrate improved function as indicated by a score of greater than or equal to 56 out of 100 on FOTO. [x] Progressing  [] Met  [] Not Met     MOBILITY: Patient will improve AROM to stated goals, listed in objective measures above, in order to return to maximal functional potential and improve quality of life.  [x] Progressing  [] Met  [] Not Met     STRENGTH: Patient will improve strength to stated goals, listed in objective measures above, in order to improve functional independence and quality of life.  [x] Progressing  [] Met  [] Not Met     GAIT: Patient will demonstrate normalized gait mechanics with minimal compensation in order to return to PLOF. [x] Progressing  [] Met  [] Not Met     Patient will return to normal ADL's, IADL's, community involvement, recreational activities, and work-related activities with less than or equal to 4/10 pain and maximal function.  [x] Progressing  [] Met  [] Not Met           Plan     Continue Plan of Care (POC) and progress per patient tolerance. See treatment section for details on planned progressions next session.      Germania Galeano, PT

## 2025-03-13 ENCOUNTER — PATIENT MESSAGE (OUTPATIENT)
Dept: RHEUMATOLOGY | Facility: CLINIC | Age: 40
End: 2025-03-13
Payer: COMMERCIAL